# Patient Record
Sex: FEMALE | Race: WHITE | NOT HISPANIC OR LATINO | Employment: OTHER | URBAN - METROPOLITAN AREA
[De-identification: names, ages, dates, MRNs, and addresses within clinical notes are randomized per-mention and may not be internally consistent; named-entity substitution may affect disease eponyms.]

---

## 2017-06-07 ENCOUNTER — GENERIC CONVERSION - ENCOUNTER (OUTPATIENT)
Dept: OTHER | Facility: OTHER | Age: 72
End: 2017-06-07

## 2017-06-14 ENCOUNTER — GENERIC CONVERSION - ENCOUNTER (OUTPATIENT)
Dept: OTHER | Facility: OTHER | Age: 72
End: 2017-06-14

## 2017-06-14 ENCOUNTER — LAB CONVERSION - ENCOUNTER (OUTPATIENT)
Dept: OTHER | Facility: OTHER | Age: 72
End: 2017-06-14

## 2017-06-14 LAB
A/G RATIO (HISTORICAL): 1.8 (CALC) (ref 1–2.5)
ALBUMIN SERPL BCP-MCNC: 4.5 G/DL (ref 3.6–5.1)
ALP SERPL-CCNC: 89 U/L (ref 33–130)
ALT SERPL W P-5'-P-CCNC: 13 U/L (ref 6–29)
AST SERPL W P-5'-P-CCNC: 18 U/L (ref 10–35)
BASOPHILS # BLD AUTO: 0.6 %
BASOPHILS # BLD AUTO: 44 CELLS/UL (ref 0–200)
BILIRUB SERPL-MCNC: 0.6 MG/DL (ref 0.2–1.2)
BUN SERPL-MCNC: 13 MG/DL (ref 7–25)
BUN/CREA RATIO (HISTORICAL): 13 (CALC) (ref 6–22)
CALCIUM SERPL-MCNC: 9.7 MG/DL (ref 8.6–10.4)
CHLORIDE SERPL-SCNC: 100 MMOL/L (ref 98–110)
CHOLEST SERPL-MCNC: 167 MG/DL (ref 125–200)
CHOLEST/HDLC SERPL: 2.1 (CALC)
CO2 SERPL-SCNC: 25 MMOL/L (ref 20–31)
CREAT SERPL-MCNC: 1 MG/DL (ref 0.6–0.93)
DEPRECATED RDW RBC AUTO: 13.3 % (ref 11–15)
EGFR AFRICAN AMERICAN (HISTORICAL): 64 ML/MIN/1.73M2
EGFR-AMERICAN CALC (HISTORICAL): 55 ML/MIN/1.73M2
EOSINOPHIL # BLD AUTO: 292 CELLS/UL (ref 15–500)
EOSINOPHIL # BLD AUTO: 4 %
GAMMA GLOBULIN (HISTORICAL): 2.5 G/DL (CALC) (ref 1.9–3.7)
GLUCOSE (HISTORICAL): 115 MG/DL (ref 65–99)
HCT VFR BLD AUTO: 38.1 % (ref 35–45)
HDLC SERPL-MCNC: 78 MG/DL
HGB BLD-MCNC: 12.7 G/DL (ref 11.7–15.5)
LDL CHOLESTEROL (HISTORICAL): 70 MG/DL (CALC)
LYMPHOCYTES # BLD AUTO: 2161 CELLS/UL (ref 850–3900)
LYMPHOCYTES # BLD AUTO: 29.6 %
MCH RBC QN AUTO: 31.2 PG (ref 27–33)
MCHC RBC AUTO-ENTMCNC: 33.3 G/DL (ref 32–36)
MCV RBC AUTO: 93.8 FL (ref 80–100)
MONOCYTES # BLD AUTO: 694 CELLS/UL (ref 200–950)
MONOCYTES (HISTORICAL): 9.5 %
NEUTROPHILS # BLD AUTO: 4110 CELLS/UL (ref 1500–7800)
NEUTROPHILS # BLD AUTO: 56.3 %
NON-HDL-CHOL (CHOL-HDL) (HISTORICAL): 89 MG/DL (CALC)
PLATELET # BLD AUTO: 274 THOUSAND/UL (ref 140–400)
PMV BLD AUTO: 8.1 FL (ref 7.5–12.5)
POTASSIUM SERPL-SCNC: 4.3 MMOL/L (ref 3.5–5.3)
RBC # BLD AUTO: 4.07 MILLION/UL (ref 3.8–5.1)
SODIUM SERPL-SCNC: 133 MMOL/L (ref 135–146)
TOTAL PROTEIN (HISTORICAL): 7 G/DL (ref 6.1–8.1)
TRIGL SERPL-MCNC: 93 MG/DL
TSH SERPL DL<=0.05 MIU/L-ACNC: 1.76 MIU/L (ref 0.4–4.5)
WBC # BLD AUTO: 7.3 THOUSAND/UL (ref 3.8–10.8)

## 2017-06-28 ENCOUNTER — ALLSCRIPTS OFFICE VISIT (OUTPATIENT)
Dept: OTHER | Facility: OTHER | Age: 72
End: 2017-06-28

## 2017-06-28 LAB — HBA1C MFR BLD HPLC: 5.5 %

## 2017-07-26 ENCOUNTER — GENERIC CONVERSION - ENCOUNTER (OUTPATIENT)
Dept: OTHER | Facility: OTHER | Age: 72
End: 2017-07-26

## 2017-07-26 ENCOUNTER — HOSPITAL ENCOUNTER (OUTPATIENT)
Dept: RADIOLOGY | Facility: HOSPITAL | Age: 72
Discharge: HOME | End: 2017-07-26
Attending: INTERNAL MEDICINE
Payer: COMMERCIAL

## 2017-07-26 DIAGNOSIS — R92.8 OTHER ABNORMAL AND INCONCLUSIVE FINDINGS ON DIAGNOSTIC IMAGING OF BREAST: ICD-10-CM

## 2017-07-26 PROCEDURE — G0206 DX MAMMO INCL CAD UNI: HCPCS

## 2017-10-18 DIAGNOSIS — Z12.31 ENCOUNTER FOR SCREENING MAMMOGRAM FOR MALIGNANT NEOPLASM OF BREAST: ICD-10-CM

## 2017-10-25 ENCOUNTER — GENERIC CONVERSION - ENCOUNTER (OUTPATIENT)
Dept: OTHER | Facility: OTHER | Age: 72
End: 2017-10-25

## 2017-10-25 ENCOUNTER — HOSPITAL ENCOUNTER (OUTPATIENT)
Dept: RADIOLOGY | Facility: HOSPITAL | Age: 72
Discharge: HOME | End: 2017-10-25
Attending: INTERNAL MEDICINE
Payer: COMMERCIAL

## 2017-10-25 DIAGNOSIS — Z12.31 VISIT FOR SCREENING MAMMOGRAM: ICD-10-CM

## 2017-10-25 PROCEDURE — G0202 SCR MAMMO BI INCL CAD: HCPCS

## 2017-10-25 PROCEDURE — 77063 BREAST TOMOSYNTHESIS BI: CPT

## 2017-11-27 ENCOUNTER — GENERIC CONVERSION - ENCOUNTER (OUTPATIENT)
Dept: OTHER | Facility: OTHER | Age: 72
End: 2017-11-27

## 2017-12-29 ENCOUNTER — LAB CONVERSION - ENCOUNTER (OUTPATIENT)
Dept: OTHER | Facility: OTHER | Age: 72
End: 2017-12-29

## 2017-12-29 LAB
A/G RATIO (HISTORICAL): 1.7 (CALC) (ref 1–2.5)
ALBUMIN SERPL BCP-MCNC: 4.6 G/DL (ref 3.6–5.1)
ALP SERPL-CCNC: 84 U/L (ref 33–130)
ALT SERPL W P-5'-P-CCNC: 15 U/L (ref 6–29)
AST SERPL W P-5'-P-CCNC: 18 U/L (ref 10–35)
BASOPHILS # BLD AUTO: 0.8 %
BASOPHILS # BLD AUTO: 59 CELLS/UL (ref 0–200)
BILIRUB SERPL-MCNC: 0.8 MG/DL (ref 0.2–1.2)
BUN SERPL-MCNC: 13 MG/DL (ref 7–25)
BUN/CREA RATIO (HISTORICAL): 13 (CALC) (ref 6–22)
CALCIUM SERPL-MCNC: 9.3 MG/DL (ref 8.6–10.4)
CHLORIDE SERPL-SCNC: 105 MMOL/L (ref 98–110)
CHOLEST SERPL-MCNC: 177 MG/DL
CHOLEST/HDLC SERPL: 2.1 (CALC)
CO2 SERPL-SCNC: 26 MMOL/L (ref 20–31)
CREAT SERPL-MCNC: 0.98 MG/DL (ref 0.6–0.93)
DEPRECATED RDW RBC AUTO: 11.7 % (ref 11–15)
EGFR AFRICAN AMERICAN (HISTORICAL): 66 ML/MIN/1.73M2
EGFR-AMERICAN CALC (HISTORICAL): 57 ML/MIN/1.73M2
EOSINOPHIL # BLD AUTO: 229 CELLS/UL (ref 15–500)
EOSINOPHIL # BLD AUTO: 3.1 %
GAMMA GLOBULIN (HISTORICAL): 2.7 G/DL (CALC) (ref 1.9–3.7)
GLUCOSE (HISTORICAL): 117 MG/DL (ref 65–99)
HBA1C MFR BLD HPLC: 5.9 % OF TOTAL HGB
HCT VFR BLD AUTO: 38.8 % (ref 35–45)
HDLC SERPL-MCNC: 84 MG/DL
HGB BLD-MCNC: 12.9 G/DL (ref 11.7–15.5)
LDL CHOLESTEROL (HISTORICAL): 74 MG/DL (CALC)
LYMPHOCYTES # BLD AUTO: 1991 CELLS/UL (ref 850–3900)
LYMPHOCYTES # BLD AUTO: 26.9 %
MCH RBC QN AUTO: 30.6 PG (ref 27–33)
MCHC RBC AUTO-ENTMCNC: 33.2 G/DL (ref 32–36)
MCV RBC AUTO: 92.2 FL (ref 80–100)
MONOCYTES # BLD AUTO: 718 CELLS/UL (ref 200–950)
MONOCYTES (HISTORICAL): 9.7 %
NEUTROPHILS # BLD AUTO: 4403 CELLS/UL (ref 1500–7800)
NEUTROPHILS # BLD AUTO: 59.5 %
NON-HDL-CHOL (CHOL-HDL) (HISTORICAL): 93 MG/DL (CALC)
PLATELET # BLD AUTO: 305 THOUSAND/UL (ref 140–400)
PMV BLD AUTO: 9.8 FL (ref 7.5–12.5)
POTASSIUM SERPL-SCNC: 4.1 MMOL/L (ref 3.5–5.3)
RBC # BLD AUTO: 4.21 MILLION/UL (ref 3.8–5.1)
SODIUM SERPL-SCNC: 139 MMOL/L (ref 135–146)
TOTAL PROTEIN (HISTORICAL): 7.3 G/DL (ref 6.1–8.1)
TRIGL SERPL-MCNC: 106 MG/DL
WBC # BLD AUTO: 7.4 THOUSAND/UL (ref 3.8–10.8)

## 2017-12-30 ENCOUNTER — GENERIC CONVERSION - ENCOUNTER (OUTPATIENT)
Dept: OTHER | Facility: OTHER | Age: 72
End: 2017-12-30

## 2018-01-09 NOTE — RESULT NOTES
Verified Results  MAMMO SCREENING BILATERAL W 3D & CAD [id number] 01:04PM Jovani Cárdenas Order Number: [id number]   TW Order Number: [id number]   TW Order Number: [id number]     Test Name Result Flag Reference   MAMMO SCREENING BILATERAL W 3D & CAD (Report)     Patient History:   Patient is postmenopausal    Family history of breast cancer in maternal grandmother at age 48   or over  Patient's BMI is 18 0      Reason for exam: screening (asymptomatic)  Screening     Mammo Screening Bilateral W DBT and CAD: October 12, 2016 - Check   In #: [de-identified]   2D/3D Procedure   3D views: Bilateral MLO, CC, and XCCL view(s) were taken  2D views: Bilateral MLO and CC view(s) were taken  Technologist: JOLIE Damon   Prior study comparison: September 10, 2015, bilateral screening    mammogram performed at [facility]  September 8, 2014, bilateral screening mammogram performed at [facility]  September 6, 2013, bilateral    screening mammogram performed at [facility]  September 4, 2012, bilateral screening mammogram    performed at [facility]  August 18, 2011, bilateral screening mammogram performed at [facility]  The breast tissue is extremely dense, potentially limiting the    sensitivity of mammography  Patient risk, included in this    report, assists in determining the appropriate screening regimen    (such as 3-D mammography or the inclusion of automated breast    ultrasound or MRI)  3-D mammography may also remain indicated as    screening  No dominant soft tissue mass, architectural    distortion or suspicious calcifications are noted in the right    breast  The skin and nipple contours are within normal limits  Benign calcifications are seen throughout both breasts       Within the left inner breast seen only on the CC projection, a    cluster of calcifications are new during the interim  Magnification and lateral mammography recommended  Needs additional imaging  ASSESSMENT: BiRad:0 - Incomplete: needs additional imaging    evaluation - Left     Recommendation:   Follow-up diagnostic mammogram of the left breast    8-10% of cancers will be missed on mammography  Management of a    palpable abnormality must be based on clinical grounds  Patients    will be notified of their results via letter from our facility  Accredited by [employer] and FDA       [facility]: [id number]     Risk Value(s):   Tyrer-Cuzick 10 Year: 2 678%, Tyrer-Cuzick Lifetime: 3 284%,    Myriad Table: 1 5%, SKYLA 5 Year: 1 2%, NCI Lifetime: 2 9%   Signed by:   Alana Rios MD   10/12/16       Plan  Abnormal mammogram of left breast    · MAMMO DIAGNOSTIC LEFT W CAD; Status:Hold For - Scheduling; Requested  for:12Oct2016;

## 2018-01-10 ENCOUNTER — ALLSCRIPTS OFFICE VISIT (OUTPATIENT)
Dept: OTHER | Facility: OTHER | Age: 73
End: 2018-01-10

## 2018-01-11 NOTE — RESULT NOTES
Discussion/Summary   Your mammogram is normal, please repeat yearly  - Dr Anna Mead 01:35PM Triny Todd     Test Name Result Flag Reference   MAMMO SCREENING BILATERAL W 3D & CAD (Report)     Patient History:   Patient is postmenopausal    Family history of breast cancer at age 48 or over in maternal    grandmother  Patient has never smoked  Patient's BMI is 18 0      Reason for exam: screening, asymptomatic  Screening     Mammo Screening Bilateral W DBT and CAD: October 25, 2017 - Check   In #: [de-identified]   2D/3D Procedure   3D views: Bilateral MLO, CC, and XCCL view(s) were taken  2D views: Bilateral MLO and CC view(s) were taken  Technologist: JOLIE Ram   Prior study comparison: July 26, 2017, mammo diagnostic left W    CAD performed at [place] [address]  November 1, 2016, mammo diagnostic left W CAD performed at [place] [address]  October 12, 2016, mammo screening    bilateral W DBT and CAD performed at [place] [address]  September 10, 2015, bilateral screening mammogram    performed at [place] [address]  September 8, 2014, bilateral screening mammogram performed at [place] [address]  September 6, 2013, bilateral screening   mammogram performed at [place] [address]  September 4, 2012, bilateral screening mammogram performed at [place] [address]  August 18, 2011, bilateral    screening mammogram performed at [place] [address]  July 26, 2010, bilateral screening mammogram performed    at [place] [address]  July 23, 2009, bilateral   screening mammogram performed at [place] [address]  The breast tissue is extremely dense, potentially limiting the    sensitivity of mammography   Patient risk, included in this report, assists in determining the appropriate screening regimen    (such as 3-D mammography or the inclusion of automated breast    ultrasound or MRI)  3-D mammography may also remain indicated as    screening  Bilateral digital mammography is performed  No    dominant soft tissue mass, architectural distortion or suspicious   calcifications are noted  The skin and nipple contours are    within normal limits  Scattered benign appearing calcifications    are noted  No evidence of malignancy  No significant changes when   compared to prior studies  1  No evidence of malignancy  2  No significant change when compared with the prior study  ACR BI-RADSï¾® Assessments: BiRad:2 - Benign     Recommendation:   Routine screening mammogram of both breasts in 1 year  The patient is scheduled in a reminder system for screening    mammography  8-10% of cancers will be missed on mammography  Management of a    palpable abnormality must be based on clinical grounds  Patients    will be notified of their results via letter from our facility  Accredited by [employer] and FDA  Transcription Location:  [facility] 98: [id number]     Risk Value(s):   Tyrer-Cuzick 10 Year: 2 600%, Tyrer-Cuzick Lifetime: 2 900%,    Myriad Table: 1 5%, SKYLA 5 Year: 1 2%, NCI Lifetime: 2 7%   Signed by:   Ottoniel Silva MD   10/25/17       Plan  Abnormal mammogram of left breast    · MAMMO DIAGNOSTIC LEFT W CAD ; every 6 months; Last 26Jul2017; Status:Active  Screening for malignant neoplasm of breast    · MAMMO SCREENING BILATERAL W 3D & CAD ; every 1 year;  Last [id number];  Status:Active

## 2018-01-12 NOTE — PROGRESS NOTES
Assessment   1  Mixed hyperlipidemia (272 2) (E78 2)   2  Hypothyroidism (244 9) (E03 9)   3  Abnormal fasting glucose (790 29) (R73 01)   4  Acute URI (465 9) (J06 9)    Plan   Abnormal fasting glucose    · (1) HEMOGLOBIN A1C; Status:Active; Requested for:27Uqj0761; Acute URI    · Azithromycin 250 MG Oral Tablet; TAKE 2 TABLETS ON DAY 1 THEN TAKE 1    TABLET A DAY FOR 4 DAYS   · Promethazine-DM 6 25-15 MG/5ML Oral Syrup; TAKE 5 ML EVERY 4 TO 6 HOURS    AS NEEDED FOR COUGH   · Follow-up visit in 6 months Evaluation and Treatment  Follow-up  Status: Complete     Done: [id number]  Hypothyroidism    · Levothyroxine Sodium 50 MCG Oral Tablet (Synthroid); TAKE 1 TABLET DAILY   · (1) TSH WITH FT4 REFLEX; Status:Active; Requested PGB:29XYN9799; Mixed hyperlipidemia    · Atorvastatin Calcium 10 MG Oral Tablet (Lipitor); 1 every day   · (1) CBC/PLT/DIFF; Status:Active; Requested for:64Xop6920;    · (1) COMPREHENSIVE METABOLIC PANEL; Status:Active; Requested for:27Leu5890;    · (1) LIPID PANEL, FASTING; Status:Active; Requested for:57Maz9630; Discussion/Summary      Hyperlipidemia stable, continue meds  stable  as above  up in 6 months after labs  Chief Complaint   review new lab -elevated sugar level--also cough, chest congestion      History of Present Illness      [facility] presents with complaints of gradual onset of constant episodes of moderate cold symptoms  Episodes started about 1 week ago  She is currently experiencing cold symptoms  Associated symptoms include hoarseness,-- dry cough,-- fatigue-- and-- chills, but-- no wheezing,-- no shortness of breath-- and-- no fever  The patient is being seen for follow-up of hypothyroidism of undetermined etiology  The patient reports doing well  She has had no significant interval events  The patient is currently asymptomatic  Medications:  the patient is adherent to her medication regimen, but-- she denies medication side effects        Disease management:  the patient is doing well with her goals  The patient states her hyperlipidemia has been under good control since the last visit  She has no significant interval events  Symptoms: The patient is currently asymptomatic  Associated symptoms include no focal neurologic deficits-- and-- no memory loss  Medications: the patient is adherent with her medication regimen  -- She denies medication side effects  The patient is doing well with her hyperlipidemia goals  Review of Systems        Constitutional: No fever, no chills, feels well, no tiredness, no recent weight gain or weight loss  Cardiovascular: No complaints of slow heart rate, no fast heart rate, no chest pain, no palpitations, no leg claudication, no lower extremity edema  Respiratory: No complaints of shortness of breath, no wheezing, no cough, no SOB on exertion, no orthopnea, no PND  Active Problems   1  Abnormal fasting glucose (790 29) (R73 01)   2  Abnormal mammogram of left breast (793 80) (R92 8)   3  Adult BMI <19 kg/sq m (V85 0) (Z68 1)   4  Benign familial tremor (333 1) (G25 0)   5  Colon polyp (211 3) (K63 5)   6  Encounter for screening for malignant neoplasm of colon (V76 51) (Z12 11)   7  Encounter for screening mammogram for malignant neoplasm of breast (V76 12)     (Z12 31)   8  Essential tremor (333 1) (G25 0)   9  Hypothyroidism (244 9) (E03 9)   10  Mixed hyperlipidemia (272 2) (E78 2)   11  Need for immunization against influenza (V04 81) (Z23)   12  Need for pneumococcal vaccination (V03 82) (Z23)   13  Osteoporosis (733 00) (M81 0)   14  Screening for malignant neoplasm of breast (V76 10) (Z12 31)   15  Screening for other and unspecified genitourinary condition (V81 6) (Z13 89)   16  Spondylosis of cervical region without myelopathy or radiculopathy (721 0) (M47 812)   17  Strain of right trapezius muscle (840 8) (S46 811A)   18   Well adult on routine health check (V70 0) (Z00 00)    Past Medical History   1  Encounter for routine pelvic examination (V72 31) (Z01 419)   2  History of acute bronchitis (V12 69) (Z87 09)     The active problems and past medical history were reviewed and updated today  Surgical History      The surgical history was reviewed and updated today  Family History   Mother    1  No pertinent family history  Brother    2  Family history of diabetes mellitus (V18 0) (Z83 3)     The family history was reviewed and updated today  Social History    · Former smoker (S54 22) (M16 213)   · Former smoker (B55 27) (V62 250)  The social history was reviewed and updated today  The social history was reviewed and is unchanged  Current Meds    1  Atorvastatin Calcium 10 MG Oral Tablet; 1 every day; Therapy: 27Xde0088 to (Last Symone Hawthorne)  Requested for: 28Jun2017 Ordered   2  Azopt 1 % Ophthalmic Suspension; Right eye only as directed Recorded   3  Levothyroxine Sodium 50 MCG Oral Tablet; TAKE 1 TABLET DAILY; Therapy: 67Fvf4038 to (Evaluate:23Jun2018)  Requested for: 28Jun2017; Last     Rx:28Jun2017 Ordered   4  Travatan 0 004 % SOLN; one drop each eye at hs;     Therapy: 98HYW8948 to  Requested for: 63Aqw2460 Recorded     The medication list was reviewed and updated today  Allergies   1  No Known Drug Allergies    Vitals   Vital Signs    Recorded: 57QSZ4943 01:14PM   Temperature 97 5 F   Heart Rate 72   Respiration 20   Systolic 888   Diastolic 84   Height 5 ft 3 in   Weight 103 lb    BMI Calculated 18 25   BSA Calculated 1 46     Physical Exam        Constitutional      General appearance: No acute distress, well appearing and well nourished  Ears, Nose, Mouth, and Throat      External inspection of ears and nose: Normal        Otoscopic examination: Abnormal   The right tympanic membrane was retracted  The left tympanic membrane was retracted        Nasal mucosa, septum, and turbinates: Abnormal   The bilateral nasal mucosa was edematous-- and-- red       Oropharynx: Normal with no erythema, edema, exudate or lesions  Pulmonary      Respiratory effort: No increased work of breathing or signs of respiratory distress  Auscultation of lungs: Clear to auscultation  Cardiovascular      Auscultation of heart: Normal rate and rhythm, normal S1 and S2, without murmurs  Examination of extremities for edema and/or varicosities: Normal        Carotid pulses: Normal        Abdomen      Abdomen: Non-tender, no masses  Liver and spleen: No hepatomegaly or splenomegaly  Lymphatic      Palpation of lymph nodes in neck: No lymphadenopathy  Health Management   Abnormal mammogram of left breast   MAMMO DIAGNOSTIC LEFT W CAD; every 6 months; Last 49Mbs5081; Next Due: 02CNX8083; Near    Due  Screening for malignant neoplasm of breast   MAMMO SCREENING BILATERAL W 3D & CAD; every 1 year; Last 57JYE3368; Next Due: 92TUR7099;     Active    Signatures    Electronically signed by : KATHY Leyva ; Tom 10 2018  2:20PM EST                       (Author)

## 2018-01-12 NOTE — RESULT NOTES
Verified Results  (1) GENITAL COMPREHENSIVE CULTURE 71UVJ2225 12:00AM Lugene Budd     Test Name Result Flag Reference   CULTURE, GENITAL      CULTURE, GENITAL         MICRO NUMBER:      [id number]    TEST STATUS:       FINAL    SPECIMEN SOURCE:   NOT GIVEN    SPECIMEN QUALITY:  ADEQUATE    RESULT:            Growth of normal urogenital genna         Discussion/Summary   Your culture is normal  - Dr Nahun Marshall

## 2018-01-13 VITALS
HEART RATE: 84 BPM | WEIGHT: 106 LBS | BODY MASS INDEX: 18.78 KG/M2 | RESPIRATION RATE: 16 BRPM | TEMPERATURE: 97.6 F | HEIGHT: 63 IN | SYSTOLIC BLOOD PRESSURE: 104 MMHG | DIASTOLIC BLOOD PRESSURE: 66 MMHG

## 2018-01-14 ENCOUNTER — HOSPITAL ENCOUNTER (EMERGENCY)
Facility: HOSPITAL | Age: 73
Discharge: HOME | End: 2018-01-14
Attending: EMERGENCY MEDICINE
Payer: COMMERCIAL

## 2018-01-14 ENCOUNTER — APPOINTMENT (EMERGENCY)
Dept: RADIOLOGY | Facility: HOSPITAL | Age: 73
End: 2018-01-14
Payer: COMMERCIAL

## 2018-01-14 VITALS
RESPIRATION RATE: 22 BRPM | DIASTOLIC BLOOD PRESSURE: 76 MMHG | HEART RATE: 98 BPM | SYSTOLIC BLOOD PRESSURE: 189 MMHG | WEIGHT: 106 LBS | BODY MASS INDEX: 18.78 KG/M2 | OXYGEN SATURATION: 99 % | TEMPERATURE: 98.3 F

## 2018-01-14 DIAGNOSIS — J40 BRONCHITIS: Primary | ICD-10-CM

## 2018-01-14 LAB
ANION GAP SERPL CALCULATED.3IONS-SCNC: 8 MMOL/L (ref 4–13)
BASOPHILS # BLD AUTO: 0 THOUSANDS/ΜL (ref 0–0.1)
BASOPHILS NFR BLD AUTO: 0 % (ref 0–1)
BUN SERPL-MCNC: 11 MG/DL (ref 5–25)
CALCIUM SERPL-MCNC: 9.6 MG/DL (ref 8.3–10.1)
CHLORIDE SERPL-SCNC: 101 MMOL/L (ref 100–108)
CO2 SERPL-SCNC: 30 MMOL/L (ref 21–32)
CREAT SERPL-MCNC: 1.02 MG/DL (ref 0.6–1.3)
EOSINOPHIL # BLD AUTO: 0.4 THOUSAND/ΜL (ref 0–0.61)
EOSINOPHIL NFR BLD AUTO: 4 % (ref 0–6)
ERYTHROCYTE [DISTWIDTH] IN BLOOD BY AUTOMATED COUNT: 12.4 % (ref 11.6–15.1)
GFR SERPL CREATININE-BSD FRML MDRD: 54 ML/MIN/1.73SQ M
GLUCOSE SERPL-MCNC: 103 MG/DL (ref 65–140)
HCT VFR BLD AUTO: 38.2 % (ref 37–47)
HGB BLD-MCNC: 12.9 G/DL (ref 12–16)
LYMPHOCYTES # BLD AUTO: 3.2 THOUSANDS/ΜL (ref 0.6–4.47)
LYMPHOCYTES NFR BLD AUTO: 28 % (ref 14–44)
MCH RBC QN AUTO: 31 PG (ref 27–31)
MCHC RBC AUTO-ENTMCNC: 33.7 G/DL (ref 31.4–37.4)
MCV RBC AUTO: 92 FL (ref 82–98)
MONOCYTES # BLD AUTO: 1.1 THOUSAND/ΜL (ref 0.17–1.22)
MONOCYTES NFR BLD AUTO: 10 % (ref 4–12)
NEUTROPHILS # BLD AUTO: 6.6 THOUSANDS/ΜL (ref 1.85–7.62)
NEUTS SEG NFR BLD AUTO: 58 % (ref 43–75)
NRBC BLD AUTO-RTO: 0 /100 WBCS
PLATELET # BLD AUTO: 273 THOUSANDS/UL (ref 130–400)
PMV BLD AUTO: 7.2 FL (ref 8.9–12.7)
POTASSIUM SERPL-SCNC: 4.9 MMOL/L (ref 3.5–5.3)
RBC # BLD AUTO: 4.15 MILLION/UL (ref 4.2–5.4)
SODIUM SERPL-SCNC: 139 MMOL/L (ref 136–145)
WBC # BLD AUTO: 11.4 THOUSAND/UL (ref 4.8–10.8)

## 2018-01-14 PROCEDURE — 96360 HYDRATION IV INFUSION INIT: CPT

## 2018-01-14 PROCEDURE — 94640 AIRWAY INHALATION TREATMENT: CPT

## 2018-01-14 PROCEDURE — 80048 BASIC METABOLIC PNL TOTAL CA: CPT | Performed by: EMERGENCY MEDICINE

## 2018-01-14 PROCEDURE — 36415 COLL VENOUS BLD VENIPUNCTURE: CPT | Performed by: EMERGENCY MEDICINE

## 2018-01-14 PROCEDURE — 85025 COMPLETE CBC W/AUTO DIFF WBC: CPT | Performed by: EMERGENCY MEDICINE

## 2018-01-14 PROCEDURE — 93005 ELECTROCARDIOGRAM TRACING: CPT

## 2018-01-14 PROCEDURE — 99285 EMERGENCY DEPT VISIT HI MDM: CPT

## 2018-01-14 PROCEDURE — 93005 ELECTROCARDIOGRAM TRACING: CPT | Performed by: EMERGENCY MEDICINE

## 2018-01-14 PROCEDURE — 71045 X-RAY EXAM CHEST 1 VIEW: CPT

## 2018-01-14 RX ORDER — BENZONATATE 100 MG/1
100 CAPSULE ORAL EVERY 8 HOURS
Qty: 21 CAPSULE | Refills: 0 | Status: SHIPPED | OUTPATIENT
Start: 2018-01-14 | End: 2018-01-21

## 2018-01-14 RX ORDER — PREDNISONE 20 MG/1
40 TABLET ORAL DAILY
Qty: 8 TABLET | Refills: 0 | Status: SHIPPED | OUTPATIENT
Start: 2018-01-14 | End: 2018-01-18

## 2018-01-14 RX ORDER — ALBUTEROL SULFATE 90 UG/1
2 AEROSOL, METERED RESPIRATORY (INHALATION) EVERY 4 HOURS PRN
Qty: 1 INHALER | Refills: 0 | Status: SHIPPED | OUTPATIENT
Start: 2018-01-14 | End: 2018-09-10 | Stop reason: ALTCHOICE

## 2018-01-14 RX ORDER — BENZONATATE 100 MG/1
100 CAPSULE ORAL ONCE
Status: COMPLETED | OUTPATIENT
Start: 2018-01-14 | End: 2018-01-14

## 2018-01-14 RX ORDER — PREDNISONE 20 MG/1
60 TABLET ORAL ONCE
Status: COMPLETED | OUTPATIENT
Start: 2018-01-14 | End: 2018-01-14

## 2018-01-14 RX ORDER — IPRATROPIUM BROMIDE AND ALBUTEROL SULFATE 2.5; .5 MG/3ML; MG/3ML
3 SOLUTION RESPIRATORY (INHALATION) ONCE
Status: COMPLETED | OUTPATIENT
Start: 2018-01-14 | End: 2018-01-14

## 2018-01-14 RX ADMIN — SODIUM CHLORIDE 500 ML: 0.9 INJECTION, SOLUTION INTRAVENOUS at 16:11

## 2018-01-14 RX ADMIN — IPRATROPIUM BROMIDE AND ALBUTEROL SULFATE 3 ML: .5; 3 SOLUTION RESPIRATORY (INHALATION) at 15:59

## 2018-01-14 RX ADMIN — BENZONATATE 100 MG: 100 CAPSULE ORAL at 17:15

## 2018-01-14 RX ADMIN — PREDNISONE 60 MG: 20 TABLET ORAL at 15:59

## 2018-01-14 NOTE — DISCHARGE INSTRUCTIONS
Acute Bronchitis   WHAT YOU NEED TO KNOW:   Acute bronchitis is swelling and irritation in the air passages of your lungs  This irritation may cause you to cough or have other breathing problems  Acute bronchitis often starts because of another illness, such as a cold or the flu  The illness spreads from your nose and throat to your windpipe and airways  Bronchitis is often called a chest cold  Acute bronchitis lasts about 3 to 6 weeks and is usually not a serious illness  Your cough can last for several weeks  DISCHARGE INSTRUCTIONS:   Return to the emergency department if:   · You cough up blood  · Your lips or fingernails turn blue  · You feel like you are not getting enough air when you breathe  Contact your healthcare provider if:   · You have a fever  · Your breathing problems do not go away or get worse  · Your cough does not get better within 4 weeks  · You have questions or concerns about your condition or care  Self-care:   · Get more rest   Rest helps your body to heal  Slowly start to do more each day  Rest when you feel it is needed  · Avoid irritants in the air  Avoid chemicals, fumes, and dust  Wear a face mask if you must work around dust or fumes  Stay inside on days when air pollution levels are high  If you have allergies, stay inside when pollen counts are high  Do not use aerosol products, such as spray-on deodorant, bug spray, and hair spray  · Do not smoke or be around others who smoke  Nicotine and other chemicals in cigarettes and cigars damages the cilia that move mucus out of your lungs  Ask your healthcare provider for information if you currently smoke and need help to quit  E-cigarettes or smokeless tobacco still contain nicotine  Talk to your healthcare provider before you use these products  · Drink liquids as directed  Liquids help keep your air passages moist and help you cough up mucus   You may need to drink more liquids when you have acute bronchitis  Ask how much liquid to drink each day and which liquids are best for you  · Use a humidifier or vaporizer  Use a cool mist humidifier or a vaporizer to increase air moisture in your home  This may make it easier for you to breathe and help decrease your cough  Decrease risk for acute bronchitis:   · Get the vaccinations you need  Ask your healthcare provider if you should get vaccinated against the flu or pneumonia  · Prevent the spread of germs  You can decrease your risk of acute bronchitis and other illnesses by doing the following:     [facility] [place] AUTHORITY your hands often with soap and water  Carry germ-killing hand lotion or gel with you  You can use the lotion or gel to clean your hands when soap and water are not available  ¨ Do not touch your eyes, nose, or mouth unless you have washed your hands first     ¨ Always cover your mouth when you cough to prevent the spread of germs  It is best to cough into a tissue or your shirt sleeve instead of into your hand  Ask those around you cover their mouths when they cough  ¨ Try to avoid people who have a cold or the flu  If you are sick, stay away from others as much as possible  Medicines: Your healthcare provider may  give you any of the following:  · Ibuprofen or acetaminophen  are medicines that help lower your fever  They are available without a doctor's order  Ask your healthcare provider which medicine is right for you  Ask how much to take and how often to take it  Follow directions  These medicines can cause stomach bleeding if not taken correctly  Ibuprofen can cause kidney damage  Do not take ibuprofen if you have kidney disease, an ulcer, or allergies to aspirin  Acetaminophen can cause liver damage  Do not take more than 4,000 milligrams in 24 hours  · Decongestants  help loosen mucus in your lungs and make it easier to cough up  This can help you breathe easier  · Cough suppressants  decrease your urge to cough   If your cough produces mucus, do not take a cough suppressant unless your healthcare provider tells you to  Your healthcare provider may suggest that you take a cough suppressant at night so you can rest     · Inhalers  may be given  Your healthcare provider may give you one or more inhalers to help you breathe easier and cough less  An inhaler gives your medicine to open your airways  Ask your healthcare provider to show you how to use your inhaler correctly  · Take your medicine as directed  Contact your healthcare provider if you think your medicine is not helping or if you have side effects  Tell him of her if you are allergic to any medicine  Keep a list of the medicines, vitamins, and herbs you take  Include the amounts, and when and why you take them  Bring the list or the pill bottles to follow-up visits  Carry your medicine list with you in case of an emergency  Follow up with your healthcare provider as directed:  Write down questions you have so you will remember to ask them during your follow-up visits  © 2017 [employer] Information is for End User's use only and may not be sold, redistributed or otherwise used for commercial purposes  All illustrations and images included in CareNotes® are the copyrighted property of [employer]  or Isrrael Lopez  The above information is an  only  It is not intended as medical advice for individual conditions or treatments  Talk to your doctor, nurse or pharmacist before following any medical regimen to see if it is safe and effective for you

## 2018-01-15 LAB
ATRIAL RATE: 96 BPM
P AXIS: 74 DEGREES
PR INTERVAL: 156 MS
QRS AXIS: 54 DEGREES
QRSD INTERVAL: 74 MS
QT INTERVAL: 354 MS
QTC INTERVAL: 447 MS
T WAVE AXIS: 93 DEGREES
VENTRICULAR RATE: 96 BPM

## 2018-01-15 NOTE — RESULT NOTES
Verified Results  (1) CBC/PLT/DIFF [id number] 07:01AM Madelyn Tran     Test Name Result Flag Reference   WHITE BLOOD CELL COUNT 7 3 Thousand/uL  3 8-10 8   RED BLOOD CELL COUNT 4 07 Million/uL  3 80-5 10   HEMOGLOBIN 12 7 g/dL  11 7-15 5   HEMATOCRIT 38 1 %  35 0-45 0   MCV 93 8 fL  80 0-100 0   MCH 31 2 pg  27 0-33 0   MCHC 33 3 g/dL  32 0-36 0   RDW 13 3 %  11 0-15 0   PLATELET COUNT 447 Thousand/uL  140-400   ABSOLUTE NEUTROPHILS 4110 cells/uL  5555-3627   ABSOLUTE LYMPHOCYTES 2161 cells/uL  850-3900   ABSOLUTE MONOCYTES 694 cells/uL  200-950   ABSOLUTE EOSINOPHILS 292 cells/uL     ABSOLUTE BASOPHILS 44 cells/uL  0-200   NEUTROPHILS 56 3 %     LYMPHOCYTES 29 6 %     MONOCYTES 9 5 %     EOSINOPHILS 4 0 %     BASOPHILS 0 6 %     MPV 8 1 fL  7 5-12 5     (1) COMPREHENSIVE METABOLIC PANEL [id number] 34:51NW Marilou Todd     Test Name Result Flag Reference   GLUCOSE 115 mg/dL H 65-99   Fasting reference interval     For someone without known diabetes, a glucose value  between 100 and 125 mg/dL is consistent with  prediabetes and should be confirmed with a  follow-up test    UREA NITROGEN (BUN) 13 mg/dL  7-25   CREATININE 1 00 mg/dL H 0 60-0 93   For patients >52years of age, the reference limit  for Creatinine is approximately 13% higher for people  identified as -American  eGFR NON-AFR   AMERICAN 55 mL/min/1 73m2 L > OR = 60   eGFR AFRICAN AMERICAN 64 mL/min/1 73m2  > OR = 60   BUN/CREATININE RATIO 13 (calc)  6-22   SODIUM 133 mmol/L L 135-146   POTASSIUM 4 3 mmol/L  3 5-5 3   CHLORIDE 100 mmol/L     CARBON DIOXIDE 25 mmol/L  20-31   CALCIUM 9 7 mg/dL  8 6-10 4   PROTEIN, TOTAL 7 0 g/dL  6 1-8 1   ALBUMIN 4 5 g/dL  3 6-5 1   GLOBULIN 2 5 g/dL (calc)  1 9-3 7   ALBUMIN/GLOBULIN RATIO 1 8 (calc)  1 0-2 5   BILIRUBIN, TOTAL 0 6 mg/dL  0 2-1 2   ALKALINE PHOSPHATASE 89 U/L     AST 18 U/L  10-35   ALT 13 U/L  6-29     (1) LIPID PANEL, FASTING [id number] 07:01AM Marilou Todd     Test Name Result Flag Reference   CHOLESTEROL, TOTAL 167 mg/dL  125-200   HDL CHOLESTEROL 78 mg/dL  > OR = 46   TRIGLICERIDES 93 mg/dL  <205   LDL-CHOLESTEROL 70 mg/dL (calc)  <130   Desirable range <100 mg/dL for patients with CHD or  diabetes and <70 mg/dL for diabetic patients with  known heart disease  CHOL/HDLC RATIO 2 1 (calc)  < OR = 5 0   NON HDL CHOLESTEROL 89 mg/dL (calc)     Target for non-HDL cholesterol is 30 mg/dL higher than   LDL cholesterol target       (Q) TSH, 3RD GENERATION W/REFLEX TO FT4 [id number] 07:01AM Yasmin Todd   REPORT COMMENT:  FASTING:YES     Test Name Result Flag Reference   TSH W/REFLEX TO FT4 1 76 mIU/L  0 40-4 50

## 2018-01-15 NOTE — ED PROVIDER NOTES
History  Chief Complaint   Patient presents with    Shortness of Breath     Pt reports being treated with zpack which she just finished today, and feels she is getting worse  Pt reports "coughing fit" to where she can't catch her breath  Patient presents for evaluation of cough  States she is short of breath in with her coughing fits  No chest pain or fever  History provided by:  Patient   used: No    Shortness of Breath   Associated symptoms: cough    Associated symptoms: no abdominal pain, no chest pain and no fever        None       Past Medical History:   Diagnosis Date    Disease of thyroid gland     Glaucoma     Tremor        History reviewed  No pertinent surgical history  History reviewed  No pertinent family history  I have reviewed and agree with the history as documented  Social History   Substance Use Topics    Smoking status: Former Smoker    Smokeless tobacco: Never Used    Alcohol use Yes      Comment: daily beer        Review of Systems   Constitutional: Negative for fever  Respiratory: Positive for cough and shortness of breath  Cardiovascular: Negative for chest pain  Gastrointestinal: Negative for abdominal pain  All other systems reviewed and are negative  Physical Exam  ED Triage Vitals [01/14/18 1543]   Temperature Pulse Respirations Blood Pressure SpO2   98 3 °F (36 8 °C) 98 22 (!) 189/76 99 %      Temp Source Heart Rate Source Patient Position - Orthostatic VS BP Location FiO2 (%)   Tympanic Monitor Sitting Left arm --      Pain Score       No Pain           Orthostatic Vital Signs  Vitals:    01/14/18 1543   BP: (!) 189/76   Pulse: 98   Patient Position - Orthostatic VS: Sitting       Physical Exam   Constitutional: She is oriented to person, place, and time  No distress  HENT:   Mouth/Throat: Oropharynx is clear and moist    Eyes: Pupils are equal, round, and reactive to light  Neck: Normal range of motion  Cardiovascular: Normal rate, regular rhythm and intact distal pulses  Pulmonary/Chest: She is in respiratory distress  She has wheezes  Mild respiratory distress   Abdominal: Soft  Bowel sounds are normal  There is no tenderness  Musculoskeletal: Normal range of motion  Neurological: She is alert and oriented to person, place, and time  Skin: Capillary refill takes less than 2 seconds  She is not diaphoretic  Nursing note and vitals reviewed  ED Medications  Medications   predniSONE tablet 60 mg (60 mg Oral Given 1/14/18 1559)   ipratropium-albuterol (DUO-NEB) 0 5-2 5 mg/mL inhalation solution 3 mL (3 mL Nebulization Given 1/14/18 1559)   sodium chloride 0 9 % bolus 500 mL (0 mL Intravenous Stopped 1/14/18 1711)   benzonatate (TESSALON PERLES) capsule 100 mg (100 mg Oral Given 1/14/18 1715)       Diagnostic Studies  Results Reviewed     Procedure Component Value Units Date/Time    Basic metabolic panel [[id number]] Collected:  01/14/18 1611    Lab Status:  Final result Specimen:  Blood from Arm, Right Updated:  01/14/18 1648     Sodium 139 mmol/L      Potassium 4 9 mmol/L      Chloride 101 mmol/L      CO2 30 mmol/L      Anion Gap 8 mmol/L      BUN 11 mg/dL      Creatinine 1 02 mg/dL      Glucose 103 mg/dL      Calcium 9 6 mg/dL      eGFR 54 ml/min/1 73sq m     Narrative:         National Kidney Disease Education Program recommendations are as follows:  GFR calculation is accurate only with a steady state creatinine  Chronic Kidney disease less than 60 ml/min/1 73 sq  meters  Kidney failure less than 15 ml/min/1 73 sq  meters      CBC and differential [[id number]]  (Abnormal) Collected:  01/14/18 1611    Lab Status:  Final result Specimen:  Blood from Arm, Right Updated:  01/14/18 1635     WBC 11 40 (H) Thousand/uL      RBC 4 15 (L) Million/uL      Hemoglobin 12 9 g/dL      Hematocrit 38 2 %      MCV 92 fL      MCH 31 0 pg      MCHC 33 7 g/dL      RDW 12 4 %      MPV 7 2 (L) fL      Platelets 263 Thousands/uL      nRBC 0 /100 WBCs      Neutrophils Relative 58 %      Lymphocytes Relative 28 %      Monocytes Relative 10 %      Eosinophils Relative 4 %      Basophils Relative 0 %      Neutrophils Absolute 6 60 Thousands/µL      Lymphocytes Absolute 3 20 Thousands/µL      Monocytes Absolute 1 10 Thousand/µL      Eosinophils Absolute 0 40 Thousand/µL      Basophils Absolute 0 00 Thousands/µL                  XR chest 1 view portable    (Results Pending)              Procedures  Procedures       Phone Contacts  ED Phone Contact    ED Course  ED Course                                MDM  Number of Diagnoses or Management Options  Bronchitis:   Diagnosis management comments: Pulse ox 99% on RA indicating adequate oxygenation  CXR: NAD as read by me    Patient improved after the breathing treatment and was requesting discharged  Sent home with rx for inhaler, prednisone, and abx  Amount and/or Complexity of Data Reviewed  Clinical lab tests: ordered and reviewed  Tests in the radiology section of CPT®: ordered and reviewed  Independent visualization of images, tracings, or specimens: yes    Patient Progress  Patient progress: stable    CritCare Time    Disposition  Final diagnoses:   Bronchitis     Time reflects when diagnosis was documented in both MDM as applicable and the Disposition within this note     Time User Action Codes Description Comment    1/14/2018  4:55 PM Rochelle, [address] [place] [address] Bronchitis       ED Disposition     ED Disposition Condition Comment    Discharge  Braxton Gifford discharge to home/self care  Condition at discharge: stable        Follow-up Information     Follow up With Specialties Details Why Αμαλίας MD [facility] Medicine In 3 days  [address] [facility]    [address] [address]  [phone]          Discharge Medication List as of 1/14/2018  4:58 PM      START taking these medications    Details   albuterol (PROVENTIL HFA,VENTOLIN HFA) 90 mcg/act inhaler Inhale 2 puffs every 4 (four) hours as needed for wheezing, Starting Sun 1/14/2018, Print      benzonatate (TESSALON PERLES) 100 mg capsule Take 1 capsule by mouth every 8 (eight) hours for 7 days, Starting Sun 1/14/2018, Until Sun 1/21/2018, Print      predniSONE 20 mg tablet Take 2 tablets by mouth daily for 4 days, Starting Sun 1/14/2018, Until u 1/18/2018, Print           No discharge procedures on file      ED Provider  Electronically Signed by           Tylor Jones DO  01/14/18 4837

## 2018-01-15 NOTE — ED PROCEDURE NOTE
PROCEDURE  ECG 12 Lead Documentation  Date/Time: 1/14/2018 8:41 PM  Performed by: Russ Walker by: Elizabeth Quiñones     ECG reviewed by me, the ED Provider: yes    Patient location:  ED  Interpretation:     Interpretation: non-specific    Quality:     Tracing quality:  Limited by artifact (tremor)  Rate:     ECG rate:  96    ECG rate assessment: normal    Rhythm:     Rhythm: sinus rhythm    Ectopy:     Ectopy: none    ST segments:     ST segments:  Non-specific  T waves:     T waves: normal           Rosio Chin DO  01/14/18 2042

## 2018-01-16 NOTE — RESULT NOTES
Verified Results  MAMMO DIAGNOSTIC LEFT W CAD [id number] 01:47PM Joelle Byrd Order Number: [id number]    - Patient Instructions: To schedule this appointment, please contact Central Scheduling at [phone]  Do not wear any perfume, powder, lotion or deodorant on breast or underarm area  Please bring your doctors order, referral (if needed) and insurance information with you on the day of the test  Failure to bring this information may result in this test being rescheduled  Arrive 15 minutes prior to your appointment time to register  On the day of your test, please bring any prior mammogram or breast studies with you that were not performed at a [facility]  Failure to bring prior exams may result in your test needing to be rescheduled  Test Name Result Flag Reference   MAMMO DIAGNOSTIC LEFT W CAD (Report)     Patient History:   Patient is postmenopausal    Family history of breast cancer at age 48 or over in maternal    grandmother  Patient's BMI is 18 0      Reason for exam: follow-up at short interval from prior study  Follow-up calcifications  Mammo Diagnostic Left W CAD: July 26, 2017 - Check In #: [de-identified]   CC, MLO, ML, spot compression CC with magnification, and spot    compression ML with magnification view(s) were taken of the left    breast      Technologist: Meron Harden   Prior study comparison: November 1, 2016, mammo diagnostic left W   CAD performed at [facility] Dileep Simpson  October 12, 2016, mammo screening bilateral W DBT and CAD performed at    [facility]  September 10, 2015,    bilateral screening mammogram performed at [facility] Dileep Simpson  The breast tissue is extremely dense, potentially limiting the    sensitivity of mammography   Patient risk, included in this    report, assists in determining the appropriate screening regimen    (such as 3-D mammography or the inclusion of automated breast    ultrasound or MRI)  3-D mammography may also remain indicated as    screening  Scattered benign-appearing calcifications are seen in   the inner lower left breast  These include vascular    calcifications and additional benign-appearing punctate    calcifications  No suspicious clustered microcalcifications are    seen  Scattered benign-appearing calcifications  ACR BI-RADSï¾® Assessments: BiRad:3 - Probably Benign     Recommendation:   Follow-up diagnostic mammogram of the left breast in 6 months  Return to routine screening mammogram schedule for the right    breast    Analyzed by CAD     The patient is scheduled in a reminder system  8-10% of cancers will be missed on mammography  Management of a    palpable abnormality must be based on clinical grounds  Patients   will be notified of their results via letter from our facility  Accredited by [employer] of [employer] and FDA  Transcription Location: [facility] 98: [id number]     Risk Value(s):   Tyrer-Cuzick 10 Year: 2 600%, Tyrer-Cuzick Lifetime: 2 900%,    Myriad Table: 1 5%, SKYLA 5 Year: 1 2%, NCI Lifetime: 2 7%   Signed by:   Nestor Banks MD   7/26/17       Plan  Abnormal mammogram of left breast    · MAMMO DIAGNOSTIC LEFT W CAD ; every 6 months;  Last 26Jul2017; Status:Active

## 2018-01-16 NOTE — RESULT NOTES
Verified Results  (Q) TSH, 3RD GENERATION [id number] 07:18AM Adarsh Todd   REPORT COMMENT:  FASTING:YES     Test Name Result Flag Reference   TSH 1 37 mIU/L  0 40-4 50     (Q) CBC (H/H, RBC, INDICES, WBC, PLT) [id number] 07:18AM Adarsh Toddlucia     Test Name Result Flag Reference   WHITE BLOOD CELL COUNT 7 4 Thousand/uL  3 8-10 8   RED BLOOD CELL COUNT 4 07 Million/uL  3 80-5 10   HEMOGLOBIN 12 8 g/dL  11 7-15 5   HEMATOCRIT 38 7 %  35 0-45 0   MCV 95 1 fL  80 0-100 0   MCH 31 4 pg  27 0-33 0   MCHC 33 0 g/dL  32 0-36 0   RDW 13 2 %  11 0-15 0   PLATELET COUNT 349 Thousand/uL  140-400   MPV 8 0 fL  7 5-11 5     (1) COMPREHENSIVE METABOLIC PANEL [id number] 87:74UV Adarsh Todd     Test Name Result Flag Reference   GLUCOSE 94 mg/dL  65-99   Fasting reference interval   UREA NITROGEN (BUN) 17 mg/dL  7-25   CREATININE 0 91 mg/dL  0 60-0 93   For patients >52years of age, the reference limit  for Creatinine is approximately 13% higher for people  identified as -American  eGFR NON-AFR   AMERICAN 63 mL/min/1 73m2  > OR = 60   eGFR AFRICAN AMERICAN 73 mL/min/1 73m2  > OR = 60   BUN/CREATININE RATIO   3-56   NOT APPLICABLE (calc)   SODIUM 139 mmol/L  135-146   POTASSIUM 4 5 mmol/L  3 5-5 3   CHLORIDE 104 mmol/L     CARBON DIOXIDE 23 mmol/L  19-30   CALCIUM 9 6 mg/dL  8 6-10 4   PROTEIN, TOTAL 6 9 g/dL  6 1-8 1   ALBUMIN 4 5 g/dL  3 6-5 1   GLOBULIN 2 4 g/dL (calc)  1 9-3 7   ALBUMIN/GLOBULIN RATIO 1 9 (calc)  1 0-2 5   BILIRUBIN, TOTAL 0 7 mg/dL  0 2-1 2   ALKALINE PHOSPHATASE 90 U/L     AST 19 U/L  10-35   ALT 15 U/L  6-29     (Q) LIPID PANEL WITH REFLEX TO DIRECT LDL [id number] 07:18AM Adarsh Todd     Test Name Result Flag Reference   CHOLESTEROL, TOTAL 188 mg/dL  125-200   HDL CHOLESTEROL 76 mg/dL  > OR = 46   TRIGLICERIDES 91 mg/dL  <924   LDL-CHOLESTEROL 94 mg/dL (calc)  <130   Desirable range <100 mg/dL for patients with CHD or  diabetes and <70 mg/dL for diabetic patients with  known heart disease  CHOL/HDLC RATIO 2 5 (calc)  < OR = 5 0   NON HDL CHOLESTEROL 112 mg/dL (calc)     Target for non-HDL cholesterol is 30 mg/dL higher than   LDL cholesterol target

## 2018-01-16 NOTE — RESULT NOTES
Verified Results  MAMMO DIAGNOSTIC LEFT W CAD [id number] 02:22PM Wallace Merida Order Number: [id number]    - Patient Instructions: To schedule this appointment, please contact Central Scheduling at [phone]  Do not wear any perfume, powder, lotion or deodorant on breast or underarm area  Please bring your doctors order, referral (if needed) and insurance information with you on the day of the test  Failure to bring this information may result in this test being rescheduled  Arrive 15 minutes prior to your appointment time to register  On the day of your test, please bring any prior mammogram or breast studies with you that were not performed at a [facility]  Failure to bring prior exams may result in your test needing to be rescheduled   Order Number: [id number]    - Patient Instructions: To schedule this appointment, please contact Central Scheduling at [phone]  Do not wear any perfume, powder, lotion or deodorant on breast or underarm area  Please bring your doctors order, referral (if needed) and insurance information with you on the day of the test  Failure to bring this information may result in this test being rescheduled  Arrive 15 minutes prior to your appointment time to register  On the day of your test, please bring any prior mammogram or breast studies with you that were not performed at a [facility]  Failure to bring prior exams may result in your test needing to be rescheduled  Test Name Result Flag Reference   MAMMO DIAGNOSTIC LEFT W CAD (Report)     Patient History:   Patient is postmenopausal    Family history of breast cancer in maternal grandmother at age 48   or over  Patient's BMI is 18 0      Reason for exam: additional evaluation requested from prior    study  Evaluate calcifications seen on screening study       Mammo Diagnostic Left W CAD: November 1, 2016 - Check In #:    [de-identified]   CC and MLO view(s) were taken of the left breast      Technologist: JOLIE Chavez   Prior study comparison: October 12, 2016, mammo screening    bilateral W DBT and CAD performed at [place] [address]  September 10, 2015, bilateral screening mammogram    performed at [place] [address]  September 8, 2014, bilateral screening mammogram performed at [place] [address]  September 6, 2013, bilateral screening   mammogram performed at [place] [address]  September 4, 2012, bilateral screening mammogram performed at [place] [address]  August 18, 2011, bilateral    screening mammogram performed at [place] [address]  The breast tissue is extremely dense, potentially limiting the    sensitivity of mammography  Patient risk, included in this    report, assists in determining the appropriate screening regimen    (such as 3-D mammography or the inclusion of automated breast    ultrasound or MRI)  3-D mammography may also remain indicated as    screening  Scattered benign-appearing calcifications are seen in   the inner left breast on the cc view  Scattered benign-appearing calcifications in the left    breast      ASSESSMENT: BiRad:3 - Probably Benign     Recommendation:   Follow-up diagnostic mammogram of the left breast in 6 months  Analyzed by CAD     8-10% of cancers will be missed on mammography  Management of a    palpable abnormality must be based on clinical grounds  Patients   will be notified of their results via letter from our facility  Accredited by [employer] and FDA       Transcription Location: [facility] 98: [id number]     Risk Value(s):   Tyrer-Cuzick 10 Year: 2 678%, Tyrer-Cuzick Lifetime: 3 284%,    Myriad Table: 1 5%, SKYLA 5 Year: 1 2%, NCI Lifetime: 2 9%   Signed by:   Everett Fong MD   11/1/16       Plan  Abnormal mammogram of left breast    · MAMMO DIAGNOSTIC LEFT W CAD; Status:Hold For - Scheduling; Requested  for:[id number];

## 2018-01-22 VITALS
BODY MASS INDEX: 19.14 KG/M2 | HEART RATE: 80 BPM | WEIGHT: 108 LBS | TEMPERATURE: 96.1 F | SYSTOLIC BLOOD PRESSURE: 118 MMHG | RESPIRATION RATE: 20 BRPM | DIASTOLIC BLOOD PRESSURE: 80 MMHG | HEIGHT: 63 IN

## 2018-01-23 VITALS
HEIGHT: 63 IN | TEMPERATURE: 97.5 F | RESPIRATION RATE: 20 BRPM | BODY MASS INDEX: 18.25 KG/M2 | DIASTOLIC BLOOD PRESSURE: 84 MMHG | SYSTOLIC BLOOD PRESSURE: 136 MMHG | HEART RATE: 72 BPM | WEIGHT: 103 LBS

## 2018-01-23 NOTE — RESULT NOTES
Verified Results  (1) CBC/PLT/DIFF [id number] 07:15AM Any Todd     Test Name Result Flag Reference   WHITE BLOOD CELL COUNT 7 4 Thousand/uL  3 8-10 8   RED BLOOD CELL COUNT 4 21 Million/uL  3 80-5 10   HEMOGLOBIN 12 9 g/dL  11 7-15 5   HEMATOCRIT 38 8 %  35 0-45 0   MCV 92 2 fL  80 0-100 0   MCH 30 6 pg  27 0-33 0   MCHC 33 2 g/dL  32 0-36 0   RDW 11 7 %  11 0-15 0   PLATELET COUNT 920 Thousand/uL  140-400   ABSOLUTE NEUTROPHILS 4403 cells/uL  1996-3466   ABSOLUTE LYMPHOCYTES 1991 cells/uL  850-3900   ABSOLUTE MONOCYTES 718 cells/uL  200-950   ABSOLUTE EOSINOPHILS 229 cells/uL     ABSOLUTE BASOPHILS 59 cells/uL  0-200   NEUTROPHILS 59 5 %     LYMPHOCYTES 26 9 %     MONOCYTES 9 7 %     EOSINOPHILS 3 1 %     BASOPHILS 0 8 %     MPV 9 8 fL  7 5-12 5     (1) COMPREHENSIVE METABOLIC PANEL [id number] 36:53AK [facility]     Test Name Result Flag Reference   GLUCOSE 117 mg/dL H 65-99   Fasting reference interval     For someone without known diabetes, a glucose value  between 100 and 125 mg/dL is consistent with  prediabetes and should be confirmed with a  follow-up test    UREA NITROGEN (BUN) 13 mg/dL  7-25   CREATININE 0 98 mg/dL H 0 60-0 93   For patients >52years of age, the reference limit  for Creatinine is approximately 13% higher for people  identified as -American  eGFR NON-AFR   AMERICAN 57 mL/min/1 73m2 L > OR = 60   eGFR AFRICAN AMERICAN 66 mL/min/1 73m2  > OR = 60   BUN/CREATININE RATIO 13 (calc)  6-22   SODIUM 139 mmol/L  135-146   POTASSIUM 4 1 mmol/L  3 5-5 3   CHLORIDE 105 mmol/L     CARBON DIOXIDE 26 mmol/L  20-31   CALCIUM 9 3 mg/dL  8 6-10 4   PROTEIN, TOTAL 7 3 g/dL  6 1-8 1   ALBUMIN 4 6 g/dL  3 6-5 1   GLOBULIN 2 7 g/dL (calc)  1 9-3 7   ALBUMIN/GLOBULIN RATIO 1 7 (calc)  1 0-2 5   BILIRUBIN, TOTAL 0 8 mg/dL  0 2-1 2   ALKALINE PHOSPHATASE 84 U/L     AST 18 U/L  10-35   ALT 15 U/L  6-29     (1) LIPID PANEL, FASTING [id number] 07:15AM Any Todd     Test Name Result Flag Reference   CHOLESTEROL, TOTAL 177 mg/dL  <200   HDL CHOLESTEROL 84 mg/dL  >76   TRIGLICERIDES 687 mg/dL  <150   LDL-CHOLESTEROL 74 mg/dL (calc)     Reference range: <100     Desirable range <100 mg/dL for patients with CHD or  diabetes and <70 mg/dL for diabetic patients with  known heart disease  LDL-C is now calculated using the Sterling-Barrera   calculation, which is a validated novel method providing   better accuracy than the Friedewald equation in the   estimation of LDL-C  Tyler Swanson  [place] [address];257(05): [phone]   (http://Crowdrally/faq/JVY931)   CHOL/HDLC RATIO 2 1 (calc)  <5 0   NON HDL CHOLESTEROL 93 mg/dL (calc)  <130   For patients with diabetes plus 1 major ASCVD risk   factor, treating to a non-HDL-C goal of <100 mg/dL   (LDL-C of <70 mg/dL) is considered a therapeutic   option  (Q) HEMOGLOBIN A1c [id number] 07:15AM King Barak Todd   REPORT COMMENT:  FASTING:YES     Test Name Result Flag Reference   HEMOGLOBIN A1c 5 9 % of total Hgb H <5 7   For someone without known diabetes, a hemoglobin   A1c value between 5 7% and 6 4% is consistent with  prediabetes and should be confirmed with a   follow-up test      For someone with known diabetes, a value <7%  indicates that their diabetes is well controlled  A1c  targets should be individualized based on duration of  diabetes, age, comorbid conditions, and other  considerations  This assay result is consistent with an increased risk  of diabetes  Currently, no consensus exists regarding use of  hemoglobin A1c for diagnosis of diabetes for children

## 2018-07-11 LAB
ALBUMIN SERPL-MCNC: 4.7 G/DL (ref 3.6–5.1)
ALBUMIN/GLOB SERPL: 2 (CALC) (ref 1–2.5)
ALP SERPL-CCNC: 82 U/L (ref 33–130)
ALT SERPL-CCNC: 14 U/L (ref 6–29)
AST SERPL-CCNC: 18 U/L (ref 10–35)
BASOPHILS # BLD AUTO: 53 CELLS/UL (ref 0–200)
BASOPHILS NFR BLD AUTO: 0.8 %
BILIRUB SERPL-MCNC: 0.8 MG/DL (ref 0.2–1.2)
BUN SERPL-MCNC: 14 MG/DL (ref 7–25)
BUN/CREAT SERPL: 14 (CALC) (ref 6–22)
CALCIUM SERPL-MCNC: 9.4 MG/DL (ref 8.6–10.4)
CHLORIDE SERPL-SCNC: 99 MMOL/L (ref 98–110)
CHOLEST SERPL-MCNC: 174 MG/DL
CHOLEST/HDLC SERPL: 2.1 (CALC)
CO2 SERPL-SCNC: 26 MMOL/L (ref 20–31)
CREAT SERPL-MCNC: 0.99 MG/DL (ref 0.6–0.93)
EOSINOPHIL # BLD AUTO: 178 CELLS/UL (ref 15–500)
EOSINOPHIL NFR BLD AUTO: 2.7 %
ERYTHROCYTE [DISTWIDTH] IN BLOOD BY AUTOMATED COUNT: 11.8 % (ref 11–15)
GLOBULIN SER CALC-MCNC: 2.4 G/DL (CALC) (ref 1.9–3.7)
GLUCOSE SERPL-MCNC: 115 MG/DL (ref 65–99)
HBA1C MFR BLD: 5.7 % OF TOTAL HGB
HCT VFR BLD AUTO: 36.6 % (ref 35–45)
HDLC SERPL-MCNC: 83 MG/DL
HGB BLD-MCNC: 12.3 G/DL (ref 11.7–15.5)
LDLC SERPL CALC-MCNC: 71 MG/DL (CALC)
LYMPHOCYTES # BLD AUTO: 1723 CELLS/UL (ref 850–3900)
LYMPHOCYTES NFR BLD AUTO: 26.1 %
MCH RBC QN AUTO: 31.1 PG (ref 27–33)
MCHC RBC AUTO-ENTMCNC: 33.6 G/DL (ref 32–36)
MCV RBC AUTO: 92.7 FL (ref 80–100)
MONOCYTES # BLD AUTO: 673 CELLS/UL (ref 200–950)
MONOCYTES NFR BLD AUTO: 10.2 %
NEUTROPHILS # BLD AUTO: 3973 CELLS/UL (ref 1500–7800)
NEUTROPHILS NFR BLD AUTO: 60.2 %
NONHDLC SERPL-MCNC: 91 MG/DL (CALC)
PLATELET # BLD AUTO: 276 THOUSAND/UL (ref 140–400)
PMV BLD REES-ECKER: 8.9 FL (ref 7.5–12.5)
POTASSIUM SERPL-SCNC: 4.4 MMOL/L (ref 3.5–5.3)
PROT SERPL-MCNC: 7.1 G/DL (ref 6.1–8.1)
RBC # BLD AUTO: 3.95 MILLION/UL (ref 3.8–5.1)
SL AMB EGFR AFRICAN AMERICAN: 65 ML/MIN/1.73M2
SL AMB EGFR NON AFRICAN AMERICAN: 56 ML/MIN/1.73M2
SODIUM SERPL-SCNC: 134 MMOL/L (ref 135–146)
TRIGL SERPL-MCNC: 121 MG/DL
TSH SERPL-ACNC: 1.2 MIU/L (ref 0.4–4.5)
WBC # BLD AUTO: 6.6 THOUSAND/UL (ref 3.8–10.8)

## 2018-07-28 PROBLEM — G25.0 ESSENTIAL TREMOR: Status: ACTIVE | Noted: 2017-11-27

## 2018-07-28 RX ORDER — BRINZOLAMIDE 10 MG/ML
SUSPENSION/ DROPS OPHTHALMIC 2 TIMES DAILY
COMMUNITY

## 2018-07-28 RX ORDER — ATORVASTATIN CALCIUM 10 MG/1
1 TABLET, FILM COATED ORAL DAILY
COMMUNITY
Start: 2012-08-22 | End: 2018-08-01 | Stop reason: SDUPTHER

## 2018-07-28 RX ORDER — LEVOTHYROXINE SODIUM 0.05 MG/1
1 TABLET ORAL DAILY
COMMUNITY
Start: 2012-08-27 | End: 2018-08-01 | Stop reason: SDUPTHER

## 2018-07-28 RX ORDER — TRAVOPROST OPHTHALMIC SOLUTION 0.04 MG/ML
1 SOLUTION OPHTHALMIC
COMMUNITY
Start: 2009-07-09

## 2018-07-28 NOTE — PROGRESS NOTES
Subjective:      Patient ID: Manjit Navarro is a 74 y o  female  Chief Complaint   Patient presents with    Review Labs     labs in chart        Here for follow up of of hyperlipidemia and hypothyroidism  Feels well  No side effects with medications  Denies cold or heat intolerance, weight changes, constipation  The following portions of the patient's history were reviewed and updated as appropriate: allergies, current medications, past family history, past medical history, past social history, past surgical history and problem list     Review of Systems   Constitutional: Negative  Respiratory: Negative  Cardiovascular: Negative  Current Outpatient Prescriptions   Medication Sig Dispense Refill    atorvastatin (LIPITOR) 10 mg tablet Take 1 tablet (10 mg total) by mouth daily 90 tablet 3    brinzolamide (AZOPT) 1 % ophthalmic suspension Apply to eye      levothyroxine 50 mcg tablet Take 1 tablet (50 mcg total) by mouth daily 90 tablet 3    travoprost (TRAVATAN Z) 0 004 % ophthalmic solution Apply to eye      albuterol (PROVENTIL HFA,VENTOLIN HFA) 90 mcg/act inhaler Inhale 2 puffs every 4 (four) hours as needed for wheezing 1 Inhaler 0     No current facility-administered medications for this visit  Objective:    /72   Pulse 68   Temp 97 5 °F (36 4 °C)   Resp 16   Ht 5' 3 8" (1 621 m)   Wt 48 5 kg (107 lb)   BMI 18 48 kg/m²        Physical Exam   Constitutional: She appears well-developed and well-nourished  Eyes: Conjunctivae are normal    Neck: Neck supple  No JVD present  No thyromegaly present  Cardiovascular: Normal rate, regular rhythm, normal heart sounds and intact distal pulses  Exam reveals no gallop and no friction rub  No murmur heard  Pulmonary/Chest: Effort normal and breath sounds normal  She has no wheezes  She has no rales  Abdominal: Soft  Bowel sounds are normal  She exhibits no distension  There is no tenderness     Musculoskeletal: She exhibits no edema  Assessment/Plan:    Hypothyroidism  Reviewed labs with patient, TSH is within goal and she is clinically euthyroid  Continue levothyroxine at current dose  Mixed hyperlipidemia  Stable on atorvastatin, she will continue  Diagnoses and all orders for this visit:    Medicare annual wellness visit, initial    Hypothyroidism, unspecified type  -     levothyroxine 50 mcg tablet; Take 1 tablet (50 mcg total) by mouth daily    Mixed hyperlipidemia  -     atorvastatin (LIPITOR) 10 mg tablet; Take 1 tablet (10 mg total) by mouth daily  -     CBC and differential; Future  -     Comprehensive metabolic panel; Future  -     Lipid panel; Future  -     CBC and differential  -     Comprehensive metabolic panel  -     Lipid panel    Other orders  -     Discontinue: atorvastatin (LIPITOR) 10 mg tablet; Take 1 tablet by mouth daily  -     brinzolamide (AZOPT) 1 % ophthalmic suspension; Apply to eye  -     Discontinue: levothyroxine 50 mcg tablet; Take 1 tablet by mouth daily  -     travoprost (TRAVATAN Z) 0 004 % ophthalmic solution; Apply to eye          Return in about 6 months (around 2/1/2019)         Sheila Titus MD

## 2018-08-01 ENCOUNTER — OFFICE VISIT (OUTPATIENT)
Dept: FAMILY MEDICINE CLINIC | Facility: CLINIC | Age: 73
End: 2018-08-01
Payer: COMMERCIAL

## 2018-08-01 VITALS
WEIGHT: 107 LBS | HEART RATE: 68 BPM | SYSTOLIC BLOOD PRESSURE: 118 MMHG | TEMPERATURE: 97.5 F | DIASTOLIC BLOOD PRESSURE: 72 MMHG | RESPIRATION RATE: 16 BRPM | BODY MASS INDEX: 18.27 KG/M2 | HEIGHT: 64 IN

## 2018-08-01 DIAGNOSIS — Z00.00 MEDICARE ANNUAL WELLNESS VISIT, INITIAL: Primary | ICD-10-CM

## 2018-08-01 DIAGNOSIS — E78.2 MIXED HYPERLIPIDEMIA: ICD-10-CM

## 2018-08-01 DIAGNOSIS — E03.9 HYPOTHYROIDISM, UNSPECIFIED TYPE: ICD-10-CM

## 2018-08-01 PROCEDURE — 99213 OFFICE O/P EST LOW 20 MIN: CPT | Performed by: INTERNAL MEDICINE

## 2018-08-01 PROCEDURE — G0438 PPPS, INITIAL VISIT: HCPCS | Performed by: INTERNAL MEDICINE

## 2018-08-01 PROCEDURE — 3725F SCREEN DEPRESSION PERFORMED: CPT | Performed by: INTERNAL MEDICINE

## 2018-08-01 PROCEDURE — 1101F PT FALLS ASSESS-DOCD LE1/YR: CPT | Performed by: INTERNAL MEDICINE

## 2018-08-01 RX ORDER — LEVOTHYROXINE SODIUM 0.05 MG/1
50 TABLET ORAL DAILY
Qty: 90 TABLET | Refills: 3 | Status: SHIPPED | OUTPATIENT
Start: 2018-08-01 | End: 2019-08-14 | Stop reason: SDUPTHER

## 2018-08-01 RX ORDER — ATORVASTATIN CALCIUM 10 MG/1
10 TABLET, FILM COATED ORAL DAILY
Qty: 90 TABLET | Refills: 3 | Status: SHIPPED | OUTPATIENT
Start: 2018-08-01 | End: 2019-08-14 | Stop reason: SDUPTHER

## 2018-08-01 NOTE — ASSESSMENT & PLAN NOTE
Reviewed labs with patient, TSH is within goal and she is clinically euthyroid  Continue levothyroxine at current dose

## 2018-08-01 NOTE — PROGRESS NOTES
AW[facility] Clinical     ISAR:   Previous hospitalizations?:  No       Once in a Lifetime Medicare Screening:   EKG performed?:  Yes        Medicare Screening Tests and Risk Assessment:   AAA Risk Assessment    None Indicated:  Yes    Osteoporosis Risk Assessment    :  Yes    Age over 48:  Yes Low body weight (<127lbs):  Yes   HIV Risk Assessment    None indicated:  Yes        Drug and Alcohol Use:   Tobacco use    Cigarettes:  former smoker    Tobacco use duration    Tobacco Cessation Readiness    Alcohol use    Alcohol use:  occasional use    Concern about alcohol use:  No    Alcohol Treatment Readiness   Illicit Drug Use    Drug use:  never    Drug type:  no sedative use       Diet & Exercise:   Diet   What is your diet?:  Regular   How many servings a day of the following:   Exercise    Do you currently exercise?:  yes    Type of exercise:  cardio   bowls        Cognitive Impairment Screening:   Anxiety screenings preformed:   Yes Anxiety screen score:  0   Depression screening preformed:  Yes Depression screen score:  0   Depression screening results:  negative for symptoms   Cognitive Impairment Screening    Do you have difficulty learning or retaining new information?:  No Do you have difficulty handling new tasks?:  No   Do you have difficulty with reasoning?:  No Do you have difficulty with spatial ability and orientation?:  No   Do you have difficulty with language?:  No Do you have difficulty with behavior?:  No       Functional Ability/Level of Safety:   Hearing    Hearing difficulties:  Yes Bilateral:  slightly decreased   Left:  slightly decreased Right:  slightly decreased   Hearing aid:  No    Hearing Impairment Assessment    Hearing status:  Hard of hearing   Current Activities    Status:  unlimited ADL's, unlimited driving, unlimited IADL's, unlimited social activities   Help needed with the folllowing:    Using the phone:  No Transportation:  No   Shopping:  No Preparing Meals:  No   Doing Housework:  No Doing Laundry:  No   Managing Medications:  No Managing Money:  No   ADL    Feeding:  Independant   Oral hygiene and Facial grooming:  Independant   Bathing:  Independant   Upper Body Dressing:  Independant   Lower Body Dressing:  Independant   Toileting:  Independant   Bed Mobility:  Independant   Fall Risk   Have you fallen in the last 12 months?:  No    Injury History   Polypharmacy:  No Antidepressant Use:  No   Sedative Use:  No Antihypertensive Use:  No   Previous Fall:  No Alcohol Use:  No   Deconditioning:  No Visual Impairment:  No   Cogitive Impairment:  No Mmobility Impairment:  No   Postural Hypotension:  No Urinary Incontinence:  No       Home Safety:   Are there hazards in your environment?:  No   Home Safety Risk Factors   Unfamilar with surroundings:  No Uneven floors:  No   Stairs or handrail saftey risk:  No Loose rugs:  No   Household clutter:  No Poor household lighting:  No   No grab bars in bathroom:  Yes Further evaluation needed:  No       Advanced Directives:   Advanced Directives    Living Will:  Yes Durable POA for healthcare:  Yes   Patient's End of Life Decisions        Urinary Incontinence:   Do you have urinary incontinence?:  No        Glaucoma:           Assessment and Plan:    Problem List Items Addressed This Visit     Hypothyroidism     Reviewed labs with patient, TSH is within goal and she is clinically euthyroid  Continue levothyroxine at current dose  Relevant Medications    levothyroxine 50 mcg tablet    Mixed hyperlipidemia     Stable on atorvastatin, she will continue           Relevant Medications    atorvastatin (LIPITOR) 10 mg tablet    Other Relevant Orders    CBC and differential    Comprehensive metabolic panel    Lipid panel      Other Visit Diagnoses     Medicare annual wellness visit, initial    -  Primary        Health Maintenance Due   Topic Date Due    Depression Screening PHQ-9  1945    CRC Screening: Colonoscopy  1945    DTaP,Tdap,and Td Vaccines (1 - Tdap) 05/07/1964    Fall Risk  05/07/2008    Urinary Incontinence Screening  05/07/2008    GLAUCOMA SCREENING 72 + YR  05/07/2010         HPI:  Nandini Manriquez is a 74 y o  female here for her Initial Wellness Visit  Patient Active Problem List   Diagnosis    Essential tremor    Colon polyp    Hypothyroidism    Osteoporosis    Mixed hyperlipidemia     Past Medical History:   Diagnosis Date    Disease of thyroid gland     Glaucoma     Tremor      No past surgical history on file  Family History   Problem Relation Age of Onset    No Known Problems Mother     Diabetes Brother         DM     History   Smoking Status    Former Smoker   Smokeless Tobacco    Never Used     History   Alcohol Use    Yes     Comment: daily beer      History   Drug Use No       Current Outpatient Prescriptions   Medication Sig Dispense Refill    atorvastatin (LIPITOR) 10 mg tablet Take 1 tablet (10 mg total) by mouth daily 90 tablet 3    brinzolamide (AZOPT) 1 % ophthalmic suspension Apply to eye      levothyroxine 50 mcg tablet Take 1 tablet (50 mcg total) by mouth daily 90 tablet 3    travoprost (TRAVATAN Z) 0 004 % ophthalmic solution Apply to eye      albuterol (PROVENTIL HFA,VENTOLIN HFA) 90 mcg/act inhaler Inhale 2 puffs every 4 (four) hours as needed for wheezing 1 Inhaler 0     No current facility-administered medications for this visit        No Known Allergies  Immunization History   Administered Date(s) Administered    Influenza Split High Dose Preservative Free IM 10/18/2012, 10/03/2013    Influenza TIV (IM) 10/20/2010, 10/26/2011, 09/08/2014, 09/12/2015    Pneumococcal Conjugate 13-Valent 12/16/2015    Pneumococcal Polysaccharide PPV23 07/09/2009       Patient Care Team:  Angelica Snellen, MD as PCP - General  Marlene Bradley MD      Medicare Screening Tests and Risk Assessments:  [facility] Clinical     ISAR:   Previous hospitalizations?:  No       Once in a Lifetime Medicare Screening:   EKG performed?:  Yes        Medicare Screening Tests and Risk Assessment:   AAA Risk Assessment    None Indicated:  Yes    Osteoporosis Risk Assessment    :  Yes    Age over 48:  Yes Low body weight (<127lbs):  Yes   HIV Risk Assessment    None indicated:  Yes        Drug and Alcohol Use:   Tobacco use    Cigarettes:  former smoker    Tobacco use duration    Tobacco Cessation Readiness    Alcohol use    Alcohol use:  occasional use    Concern about alcohol use:  No    Alcohol Treatment Readiness   Illicit Drug Use    Drug use:  never    Drug type:  no sedative use       Diet & Exercise:   Diet   What is your diet?:  Regular   How many servings a day of the following:   Exercise    Do you currently exercise?:  yes    Type of exercise:  cardio   bowls        Cognitive Impairment Screening:   Anxiety screenings preformed:   Yes Anxiety screen score:  0   Depression screening preformed:  Yes Depression screen score:  0   Depression screening results:  negative for symptoms   Cognitive Impairment Screening    Do you have difficulty learning or retaining new information?:  No Do you have difficulty handling new tasks?:  No   Do you have difficulty with reasoning?:  No Do you have difficulty with spatial ability and orientation?:  No   Do you have difficulty with language?:  No Do you have difficulty with behavior?:  No       Functional Ability/Level of Safety:   Hearing    Hearing difficulties:  Yes Bilateral:  slightly decreased   Left:  slightly decreased Right:  slightly decreased   Hearing aid:  No    Hearing Impairment Assessment    Hearing status:  Hard of hearing   Current Activities    Status:  unlimited ADL's, unlimited driving, unlimited IADL's, unlimited social activities   Help needed with the folllowing:    Using the phone:  No Transportation:  No   Shopping:  No Preparing Meals:  No   Doing Housework:  No Doing Laundry:  No   Managing Medications:  No Managing Money:  No   ADL Feeding:  Independant   Oral hygiene and Facial grooming:  Independant   Bathing:  Independant   Upper Body Dressing:  Independant   Lower Body Dressing:  Independant   Toileting:  Independant   Bed Mobility:  Independant   Fall Risk   Have you fallen in the last 12 months?:  No    Injury History   Polypharmacy:  No Antidepressant Use:  No   Sedative Use:  No Antihypertensive Use:  No   Previous Fall:  No Alcohol Use:  No   Deconditioning:  No Visual Impairment:  No   Cogitive Impairment:  No Mmobility Impairment:  No   Postural Hypotension:  No Urinary Incontinence:  No       Home Safety:   Are there hazards in your environment?:  No   Home Safety Risk Factors   Unfamilar with surroundings:  No Uneven floors:  No   Stairs or handrail saftey risk:  No Loose rugs:  No   Household clutter:  No Poor household lighting:  No   No grab bars in bathroom:  Yes Further evaluation needed:  No       Advanced Directives:   Advanced Directives    Living Will:  Yes Durable POA for healthcare:  Yes   Patient's End of Life Decisions        Urinary Incontinence:   Do you have urinary incontinence?:  No        Glaucoma:            Provider Screening     Preventative Screening/Counseling:   Cardiovascular Screening/Counseling:   (Labs Q5 years, EKG optional one-time)   General:  Risks and Benefits Discussed, Screening Current Counseling:  Healthy Weight, Improve Cholesterol, Healthy Diet, Improve Blood Pressure, Improve Exercise Tolerance          Diabetes Screening/Counseling:   (2 tests/year if Pre-Diabetes or 1 test/year if no Diabetes)   General:  Risks and Benefits Discussed, Screening Current           Colorectal Cancer Screening/Counseling:   (FOBT Q1 yr; Flex Sig Q4 yrs or Q10 yrs after Screening Colonoscopy; Screening Colonoscpy Q2 yrs High Risk or Q10 yrs Low Risk; Barium Enema Q2 yrs High Risk or Q4 yrs Low Risk)   General:  Screening Not Indicated           Prostate Cancer Screening/Counseling:   (Annual)          Breast Cancer Screening/Counseling:   (Baseline Age 28 - 43; Annual Age 36+)   General:  Screening Not Indicated          Cervical Cancer Screening/Counseling:   (Annual for High Risk or Childbearing Age with Abnormal Pap in Last 3 yrs; Every 2 all others)   General:  Screening Not Indicated           Osteoporosis Screening/Counseling:   (Every 2 Yrs if at risk or more if medically necessary)   General:  Patient Declines           AAA Screening/Counseling:   (Once per Lifetime with risk factors)    General:  Screening Not Indicated           Glaucoma Screening/Counseling:   (Annual)   General:  Screening Current          HIV Screening/Counseling:   (Voluntary; Once annually for high risk OR 3 times for Pregnancy at diagnosis of IUP; 3rd trimester; and at Labor         Hepatitis C Screening:             Immunizations:   Influenza (annual):   Influenza UTD This Year   Pneumococcal (Once in a Lifetime):  Lifetime Vaccine Completed   Hepatitis B Series (low risk patients):  Series Not Indicated   Zostavax (Medicare D Coverage, Pt >72 yo):  Patient Declines   TD (Non-Medicare Wellness  Visit required):  Patient Declines       Other Preventative Couseling (Non-Medicare Wellness Visit Required):       Referrals (Non-Medicare Wellness Visit Required):       Medical Equipment/Suppliers:

## 2018-09-10 ENCOUNTER — OFFICE VISIT (OUTPATIENT)
Dept: FAMILY MEDICINE CLINIC | Facility: CLINIC | Age: 73
End: 2018-09-10
Payer: COMMERCIAL

## 2018-09-10 VITALS
SYSTOLIC BLOOD PRESSURE: 120 MMHG | RESPIRATION RATE: 16 BRPM | BODY MASS INDEX: 18.61 KG/M2 | DIASTOLIC BLOOD PRESSURE: 80 MMHG | TEMPERATURE: 96.6 F | HEART RATE: 68 BPM | HEIGHT: 64 IN | WEIGHT: 109 LBS

## 2018-09-10 DIAGNOSIS — E03.9 HYPOTHYROIDISM, UNSPECIFIED TYPE: ICD-10-CM

## 2018-09-10 DIAGNOSIS — E78.2 MIXED HYPERLIPIDEMIA: ICD-10-CM

## 2018-09-10 DIAGNOSIS — R42 DIZZINESS: Primary | ICD-10-CM

## 2018-09-10 DIAGNOSIS — G25.0 ESSENTIAL TREMOR: ICD-10-CM

## 2018-09-10 PROCEDURE — 99214 OFFICE O/P EST MOD 30 MIN: CPT | Performed by: FAMILY MEDICINE

## 2018-09-10 NOTE — PROGRESS NOTES
Assessment/Plan:    No problem-specific Assessment & Plan notes found for this encounter  CT head due to acute sx since can't keep head still for MRI due to tremors  Neuro consult for tremors, pt wants to hear options since failed many meds in past, possible additional referrals  Chol/thyroid stable       Diagnoses and all orders for this visit:    Dizziness  -     Ambulatory referral to Neurology; Future  -     CT head wo contrast; Future    Essential tremor  -     Ambulatory referral to Neurology; Future  -     CT head wo contrast; Future    Mixed hyperlipidemia    Hypothyroidism, unspecified type              No Follow-up on file  Subjective:      Patient ID: Vida Solano is a 74 y o  female  Chief Complaint   Patient presents with    Dizziness     Would like referral for Neuro prcma    Nausea       HPI  Last week dizzy episode  Staggering, off balance  Nausea  Fine sitting and laying down  Not with head movt  Some good days since  May last minutes  Works as [employer]  No fall  On/off for months  No imaging in past  Hx of essential tremors  Wants to see a neurologist, saw one in [id number] [facility], failed many meds  Tremors have gotten worse  Does not get anxious in MRI but can't stay still  MRI distant past, 10y , of right shoulder    The following portions of the patient's history were reviewed and updated as appropriate: allergies, current medications, past family history, past medical history, past social history, past surgical history and problem list     Review of Systems   Constitutional: Negative for fever  Cardiovascular: Negative for chest pain and palpitations  Neurological: Positive for tremors  Negative for weakness           Current Outpatient Prescriptions   Medication Sig Dispense Refill    atorvastatin (LIPITOR) 10 mg tablet Take 1 tablet (10 mg total) by mouth daily 90 tablet 3    brinzolamide (AZOPT) 1 % ophthalmic suspension Apply to eye      levothyroxine 50 mcg tablet Take 1 tablet (50 mcg total) by mouth daily 90 tablet 3    travoprost (TRAVATAN Z) 0 004 % ophthalmic solution Apply to eye       No current facility-administered medications for this visit  Objective:    /80   Pulse 68   Temp (!) 96 6 °F (35 9 °C)   Resp 16   Ht 5' 3 8" (1 621 m)   Wt 49 4 kg (109 lb)   BMI 18 83 kg/m²        Physical Exam   Constitutional: She appears well-developed  No distress  HENT:   Head: Normocephalic  Mouth/Throat: No oropharyngeal exudate  Eyes: Conjunctivae are normal  No scleral icterus  Neck: Neck supple  No thyromegaly present  Cardiovascular: Normal rate and intact distal pulses  No murmur heard  Pulmonary/Chest: Effort normal  No respiratory distress  Abdominal: Soft  Musculoskeletal: She exhibits no edema or deformity  Lymphadenopathy:     She has no cervical adenopathy  Neurological: She is alert  She has normal reflexes  No cranial nerve deficit  She exhibits normal muscle tone  Head and hand tremors   Skin: Skin is warm and dry  Psychiatric: Her behavior is normal  Thought content normal    Nursing note and vitals reviewed               Gerald Gomez DO

## 2018-09-13 ENCOUNTER — OFFICE VISIT (OUTPATIENT)
Dept: NEUROLOGY | Facility: CLINIC | Age: 73
End: 2018-09-13
Payer: COMMERCIAL

## 2018-09-13 VITALS
SYSTOLIC BLOOD PRESSURE: 136 MMHG | DIASTOLIC BLOOD PRESSURE: 82 MMHG | BODY MASS INDEX: 18.78 KG/M2 | HEIGHT: 64 IN | HEART RATE: 73 BPM | WEIGHT: 110 LBS

## 2018-09-13 DIAGNOSIS — G24.9 DYSKINESIA: ICD-10-CM

## 2018-09-13 DIAGNOSIS — H81.399 PERIPHERAL VERTIGO, UNSPECIFIED LATERALITY: ICD-10-CM

## 2018-09-13 DIAGNOSIS — G25.0 ESSENTIAL TREMOR: ICD-10-CM

## 2018-09-13 DIAGNOSIS — G25.71 AKATHISIA: Primary | ICD-10-CM

## 2018-09-13 PROCEDURE — 99204 OFFICE O/P NEW MOD 45 MIN: CPT | Performed by: PSYCHIATRY & NEUROLOGY

## 2018-09-13 RX ORDER — MECLIZINE HYDROCHLORIDE 25 MG/1
25 TABLET ORAL EVERY 8 HOURS PRN
Qty: 30 TABLET | Refills: 0 | Status: SHIPPED | OUTPATIENT
Start: 2018-09-13 | End: 2020-08-27 | Stop reason: ALTCHOICE

## 2018-09-13 RX ORDER — CLONAZEPAM 0.5 MG/1
TABLET ORAL
Qty: 60 TABLET | Refills: 0 | Status: SHIPPED | OUTPATIENT
Start: 2018-09-13 | End: 2018-09-26 | Stop reason: SDUPTHER

## 2018-09-13 NOTE — PROGRESS NOTES
Outpatient Neurology History and Physical  Brian Piedra  [id number]  35 y o   1945          Consult: Yes    Cody Sosa MD      Chief Complaint   Patient presents with    Dizziness           History Obtained from: patient     HPI:    Ms Easton Deleon is a 73 yo F with PMH of dyskinesia presents for evaluation of dizziness  She states that it really started 3 months ago  She describes feeling of lightheaded, nausea, imbalance, loss of equilibrium when she stands up  After few minutes it resolves  Last week it occurred a few times  This whole week she has felt great, hasn't had any dizziness  She did get new hearing aids two weeks ago  For her tremors, she has been tried on propranolol, primidone, topamax but none had helped  She has tried botox injections for head tremor but didn't think it helped  She has head and hand tremors for 40 years  When she's nervous or ill, her symptoms are worse  She appears to have some dyskinesia as well but says she has only been tried on medications for essential tremor  She denies relevant family history  Past Medical History:   Diagnosis Date    Disease of thyroid gland     Dizziness     Glaucoma     Tremor                Current Outpatient Prescriptions on File Prior to Visit   Medication Sig Dispense Refill    atorvastatin (LIPITOR) 10 mg tablet Take 1 tablet (10 mg total) by mouth daily 90 tablet 3    brinzolamide (AZOPT) 1 % ophthalmic suspension Apply to eye      levothyroxine 50 mcg tablet Take 1 tablet (50 mcg total) by mouth daily 90 tablet 3    travoprost (TRAVATAN Z) 0 004 % ophthalmic solution Apply to eye       No current facility-administered medications on file prior to visit          No Known Allergies      Family History   Problem Relation Age of Onset    No Known Problems Mother     Diabetes Brother         DM    Diabetes Sister                 Past Surgical History:   Procedure Laterality Date    CATARACT EXTRACTION, BILATERAL Social History     Social History    Marital status: Single     Spouse name: N/A    Number of children: N/A    Years of education: N/A     Occupational History    Not on file  Social History Main Topics    Smoking status: Former Smoker    Smokeless tobacco: Never Used    Alcohol use Yes      Comment: daily beer    Drug use: No    Sexual activity: Not on file     Other Topics Concern    Not on file     Social History Narrative    No narrative on file       Review of Systems  Refer to positive review of systems in HPI  Constitutional- No fever  Eyes- No visual change  ENT- Hearing normal  CV- No chest pain  Resp- No Shortness of breath  GI- No diarrhea  - Bladder normal  MS- No Arthritis   Skin- No rash  Psych- No depression  Endo- No DM  Heme- No nodes    PHYSICAL EXAM:    Vitals:    09/13/18 1501   BP: 136/82   BP Location: Left arm   Patient Position: Sitting   Pulse: 73   Weight: 49 9 kg (110 lb)   Height: 5' 3 5" (1 613 m)         Appearance: No Acute Distress, patient can't stay still  Constantly moving her body, hands, fidgety  Ophthalmoscopic: Disc Flat, Normal fundus  Carotid/Heart/Peripheral Vascular: No Bruits, RRR  Orientation: Awake, Alert, and Oriented x 3  Mental status:  Memory: Registation 3/3 Recall 3/3  Attention: Normal  Knowledge: Appropriate  Language: No aphasia  Speech: No dysarthria  Cranial Nerves:  2 No Visual Defect on Confrontation; Pupils round, equal, reactive to light  3,4,6 Extraocular Movements Intact; no nystagmus  5 Facial Sensation Intact  7 No facial asymmetry  8 Intact hearing  9,10 Palate symmetric, normal gag  11 Good shoulder shrug  12 Tongue Midline  Gait: Stable, No ataxia, can perform tandem walking  Coordination: essential head and b/l hand tremors     Sensory: Intact, Symmetric to Pinprick, Light Touch, Vibration, and Joint Position  Muscle Tone: Normal  Muscle exam  Arm Right Left Leg Right Left   Deltoid 5/5 5/5 Iliopsoas 5/5 5/5   Biceps 5/5 5/5 Quads 5/5 5/5   Triceps 5/5 5/5 Hamstrings 5/5 5/5   Wrist Extension 5/5 5/5 Ankle Dorsi Flexion 5/5 5/5   Wrist Flexion 5/5 5/5 Ankle Plantar Flexion 5/5 5/5   Interossei 5/5 5/5 Ankle Eversion 5/5 5/5   APB 5/5 5/5 Ankle Inversion 5/5 5/5       Reflexes   RJ BJ TJ KJ AJ Plantars [place]'s   Right 2+ 2+ 2+ 2+ 2+ Downgoing Not present   Left 2+ 2+ 2+ 2+ 2+ Downgoing Not present         Personal review of              Assessment/Plan:     1  Akathisia  Ambulatory referral to Neurology    clonazePAM (KlonoPIN) 0 5 mg tablet   2  Dyskinesia  Ambulatory referral to Neurology    clonazePAM (KlonoPIN) 0 5 mg tablet   3  Peripheral vertigo, unspecified laterality  meclizine (ANTIVERT) 25 mg tablet   4  Essential tremor  Ambulatory referral to Neurology         Episodes of dizziness appear to be from peripheral etiology  Since she doesn't have sxs anymore, will hold off on referring her to balance center  Will prescribe prn meclizine  As far as tremors are concerned, it seems that there is more to it than just essential tremor  I'm referring her to one of our movement specialists for further evaluation and treatment  For now will try low dose klonopin to see if her akathisia gets better  Counseling Documentation:  The patient and/or patient's family were  counseled regarding diagnostic results  Instructions for management,risk factor reductions,prognosis of disease were discussed  Patient and family were educated regarding impressions,risks and benefits of treatment options,importance of compliance with treatment  Total time of encounter: 45 min   More than 50% of time was spent in counseling and coordination of care of patient  KATHY Moses    [facility]  [phone]  [place] [address]

## 2018-09-20 ENCOUNTER — HOSPITAL ENCOUNTER (OUTPATIENT)
Dept: RADIOLOGY | Facility: HOSPITAL | Age: 73
Discharge: HOME | End: 2018-09-20
Attending: FAMILY MEDICINE
Payer: COMMERCIAL

## 2018-09-20 DIAGNOSIS — R42 DIZZINESS: ICD-10-CM

## 2018-09-20 DIAGNOSIS — G25.0 ESSENTIAL TREMOR: ICD-10-CM

## 2018-09-20 PROCEDURE — 70450 CT HEAD/BRAIN W/O DYE: CPT

## 2018-09-26 ENCOUNTER — TELEPHONE (OUTPATIENT)
Dept: NEUROLOGY | Facility: CLINIC | Age: 73
End: 2018-09-26

## 2018-09-26 DIAGNOSIS — G24.9 DYSKINESIA: ICD-10-CM

## 2018-09-26 DIAGNOSIS — G25.71 AKATHISIA: ICD-10-CM

## 2018-09-26 RX ORDER — CLONAZEPAM 0.5 MG/1
TABLET ORAL
Qty: 180 TABLET | Refills: 1 | Status: SHIPPED | OUTPATIENT
Start: 2018-09-26 | End: 2019-03-07

## 2018-09-26 NOTE — TELEPHONE ENCOUNTER
The patient is calling with a status update on her tremors  She is doing well on the Klonopin and would like a 90 day supply with refills sent to [facility]  She also states that it has helped her bowling because she is no longer shaking

## 2018-10-10 ENCOUNTER — OFFICE VISIT (OUTPATIENT)
Dept: NEUROLOGY | Facility: CLINIC | Age: 73
End: 2018-10-10
Payer: COMMERCIAL

## 2018-10-10 VITALS
BODY MASS INDEX: 19.18 KG/M2 | SYSTOLIC BLOOD PRESSURE: 120 MMHG | DIASTOLIC BLOOD PRESSURE: 70 MMHG | HEIGHT: 64 IN | HEART RATE: 118 BPM

## 2018-10-10 DIAGNOSIS — G25.0 ESSENTIAL TREMOR: ICD-10-CM

## 2018-10-10 PROBLEM — G24.3 CERVICAL DYSTONIA: Status: ACTIVE | Noted: 2018-10-10

## 2018-10-10 PROCEDURE — 99214 OFFICE O/P EST MOD 30 MIN: CPT | Performed by: PSYCHIATRY & NEUROLOGY

## 2018-10-10 RX ORDER — ZONISAMIDE 25 MG/1
25 CAPSULE ORAL DAILY
Qty: 60 CAPSULE | Refills: 6 | Status: SHIPPED | OUTPATIENT
Start: 2018-10-10 | End: 2019-03-07

## 2018-10-10 NOTE — PATIENT INSTRUCTIONS
Lets try a new medication for your tremor call zonisamide  Start with 1 capsule before bed  In 2 weeks you can increase to 2 capsules

## 2018-10-10 NOTE — PROGRESS NOTES
Assessment/Plan:    Cervical dystonia  The patient is a 68-year-old left-handed woman who presents today for evaluation of her head and bilateral arm tremor which started in mid 1970s  The patient's tremor is unusual for essential tremor  Her head tremor is large amplitude, jerky and irregular, most consistent with a dystonic tremor  She has a sensory trick which calms the tremor  Her tremor was improved with Botox injections into her neck in the past but complicated by weakness from over treatment  The patient's appendicular tremor is significantly worse on the left than the right, being a course large amplitude (up to 10cm) postural tremor on the left, worse in wing beating position  Posture>action  She has no tremor at rest or any evidence for parkinsonism  The tremor in her arms is either a continuation of her dystonic tremor or separate essential tremor  The patient does have some occasional small amplitude fidgeting movements (face, bilateral arms and legs)  which may be choreoathetoid however the majority of her movements seemed to stem from her large amplitude head movements  The patient has no family history of movement disorders and the slowly progressive and longstanding nature of her movements makes most etiologies of chorea unlikely  Akathisia is an imner sense of restlessness, which the patient denies  She has found some improvement from q h s  Klonopin, which has improved her sleep and calmed her head movements  We discussed the possibility Artane which can be helpful both for tremor and dystonia however the side effect profile was unfavorable given her age  We decided on a trial of zonisamide which can be helpful for tremor disorders  Follow up in one month       Diagnoses and all orders for this visit:    Essential tremor  -     Ambulatory referral to Neurology  -     zonisamide (ZONEGRAN) 25 mg capsule;  Take 1 capsule (25 mg total) by mouth daily Start with 1 cap QHS and increase to 2 in 2 weeks    Confirm neuroleptic exposure  Subjective:     Patient ID: Elsa Bar is a 74 y o  female  Presents alone  I had the pleasure of seeing your patient, Elsa Bar in the Movement Disorders Clinic at the [facility]  The patient is a 74y o  year old left handed female who presents for evaluation of head and neck tremor  The patient reports that her head tremor started in the mid 1970s and was probably diagnosed around 1976 as essential tremor  She was followed by a neurologist for many years and many medications were tried without successful control of her tremor  She moved to [facility] in 2004 and did see a neurologist again until 2014  At that time she was diagnosed with cervical dystonia and underwent 3 rounds of Botox treatment  The 1st treatment left the patient with a head droop requiring the use of a soft cervical collar  The 2nd was less useful and the 3rd seemed to exacerbate in alter head tremor  After the 1st Botox injection, once the head droop resolved, she did find that her head tremor was significantly improved, but not completely resolved  Looking back to her original diagnosis she recalls that the head tremor was her 1st symptom, later spreading into her hands  The tremor has been significantly worse on the left than the right, so much so that the patient was forced to start using her right hand even though she is left-handed  She reports that her symptoms have very slowly progressed over the course of decades  She does note a sensory trick where she can rest her chin gently on her hand and the tremor is suppressed  She was seen recently by 1 of our neurologists Dr William Tadeo and prescribed Klonopin to take at night  She has found this to be very helpful at reducing the tremor and helping her sleep      Previous medication trials:   - propranolol  - primidone   - topamax   - gabapentin     The following portions of the patient's history were reviewed and updated as appropriate: allergies, current medications, past family history, past medical history, past social history, past surgical history and problem list       Objective:      /70 (BP Location: Right arm, Patient Position: Sitting, Cuff Size: Standard)   Pulse (!) 118   Ht 5' 3 5" (1 613 m)   BMI 19 18 kg/m²         Physical Exam    Neurological Exam     GENERAL MEDICAL EXAMINATION:  General appearance: alert, in no apparent distress  Appropriately dressed and groomed  Conversing and interacting appropriately  Eyes: Sclera are non-injected  Ears, nose, Mouth, Throat: Mucous membranes are moist    Resp: Breathing comfortably on RA   Musculoskeletal: No evidence of deformities  No contractures  No Edema  Skin: No visible rashes  Warm and well perfused  Psych: normal and appropriate affect     NEUROLOGICAL EXAMINATION:  Mental Status: Alert and oriented to person place and time  Able to relate history without difficulty  Attentive: able to name NICK backward without difficulty  Language is fluent and appropriate with normal prosody  There were no paraphasic errors  Speech was not dysarthric  There was no pallilalia noted  Able to follow both midline and appendicular commands  Cranial Nerves:  II:  pupils equally round and briskly reactive to light, both directly and consensually  III-IV-VI: Full and conjugate, extra-ocular eye movements in all directions of gaze  No nystagmus or diplopia  Intact smooth pursuit  VII: Face is symmetric at rest and with activation; symmetric speed and excursion with smile  IX-X: Palate elevates symmetrically  XII: No tongue deviation or fasciculations    Motor: Normal muscle bulk throughout  Normal axial and appendicular tone in the arms and legs  No hypomimia was noted  Speech was not hypophonic  There was no vocal tremor  Rapid alternating movements revealed no bradykinesia, decrement or hesitations   No pronator drift or rebound  No asterixis or myoclonus noted  There is no tremor noted at rest   The patient had a large amplitude coarse postural tremor worse on the left and in the wing beating position, up to 5 cm on the right and 10 cm on the left  Her action tremor was up to 1 cm on the right and 3 cm on the left  The patient's head tremor was also course, irregular and of large amplitude  Her head tremor was forceful enough to cause movements in her torso itself  In addition, the patient had occasional fidgeting athetoid type movements in the bilateral arms and legs and in her face  A video of the patient's movements were taken with her permission  Strength:    Delt Bi    Tri  WE  FE    FF          C5   C6   C7   C6   C7 C8/T1     R 5    5      5      5      5     5          L 5    5      5      5      5     5               IP   Quad Hamst  TA      L2   L3   L4-S1    L4     R 5     5      5          5      L 5     5     5           5        Reflexes:  2+ and symmetric throughout    Coordination: Finger to nose without dysmetria bilaterally  Gait: Able to rise from a chair without the use of arms  Posture was normal  Narrow-base and stable stance  Normal initiation  Normal stride length, step height, arm swing  Review of Systems   Constitutional: Negative  Negative for appetite change and fever  HENT: Negative  Negative for hearing loss, tinnitus, trouble swallowing and voice change  Eyes: Negative  Negative for photophobia and pain  Respiratory: Negative  Negative for shortness of breath  Cardiovascular: Negative  Negative for palpitations  Gastrointestinal: Negative  Negative for nausea and vomiting  Endocrine: Negative  Negative for cold intolerance and heat intolerance  Genitourinary: Negative  Negative for dysuria, frequency and urgency  Musculoskeletal: Positive for neck pain (right side)  Negative for myalgias  Skin: Negative  Negative for rash  Neurological: Positive for tremors  Negative for dizziness, seizures, syncope, facial asymmetry, speech difficulty, weakness, light-headedness, numbness and headaches  Hematological: Bruises/bleeds easily  Psychiatric/Behavioral: Negative  Negative for confusion, hallucinations and sleep disturbance

## 2018-10-10 NOTE — ASSESSMENT & PLAN NOTE
The patient is a 40-year-old left-handed woman who presents today for evaluation of her head and bilateral arm tremor which started in mid 1970s  The patient's tremor is unusual for essential tremor  Her head tremor is large amplitude, jerky and irregular, most consistent with a dystonic tremor  She has a sensory trick which calms the tremor  Her tremor was improved with Botox injections into her neck in the past but complicated by weakness from over treatment  The patient's appendicular tremor is significantly worse on the left than the right, being a course large amplitude (up to 10cm) postural tremor on the left, worse in wing beating position  Posture>action  She has no tremor at rest or any evidence for parkinsonism  The tremor in her arms is either a continuation of her dystonic tremor or separate essential tremor  The patient does have some occasional small amplitude fidgeting movements (face, bilateral arms and legs)  which may be choreoathetoid however the majority of her movements seemed to stem from her large amplitude head movements  The patient has no family history of movement disorders and the slowly progressive and longstanding nature of her movements makes most etiologies of chorea unlikely  Akathisia is an imner sense of restlessness, which the patient denies  She has found some improvement from q h s  Klonopin, which has improved her sleep and calmed her head movements  We discussed the possibility Artane which can be helpful both for tremor and dystonia however the side effect profile was unfavorable given her age  We decided on a trial of zonisamide which can be helpful for tremor disorders       Follow up in one month

## 2018-10-10 NOTE — LETTER
October 10, 2018     Joice Press, [address] [facility] [address]    Patient: Pura Jordan   YOB: 1945   Date of Visit: 10/10/2018       Dear Dr Aicha Pretty: Thank you for referring Pura Jordan to me for evaluation  Below are my notes for this consultation  If you have questions, please do not hesitate to call me  She is a very interesting case  Thanks  Sincerely,        Nain Enamorado MD        CC: MD Nain Mcgowan MD  10/10/2018  3:04 PM  Sign at close encounter  Assessment/Plan:    Cervical dystonia  The patient is a 40-year-old left-handed woman who presents today for evaluation of her head and bilateral arm tremor which started in mid 1970s  The patient's tremor is unusual for essential tremor  Her head tremor is large amplitude, jerky and irregular, most consistent with a dystonic tremor  She has a sensory trick which calms the tremor  Her tremor was improved with Botox injections into her neck in the past but complicated by weakness from over treatment  The patient's appendicular tremor is significantly worse on the left than the right, being a course large amplitude (up to 10cm) postural tremor on the left, worse in wing beating position  Posture>action  She has no tremor at rest or any evidence for parkinsonism  The tremor in her arms is either a continuation of her dystonic tremor or separate essential tremor  The patient does have some occasional small amplitude fidgeting movements (face, bilateral arms and legs)  which may be choreoathetoid however the majority of her movements seemed to stem from her large amplitude head movements  The patient has no family history of movement disorders and the slowly progressive and longstanding nature of her movements makes most etiologies of chorea unlikely  Akathisia is an imner sense of restlessness, which the patient denies  She has found some improvement from q h s  Klonopin, which has improved her sleep and calmed her head movements  We discussed the possibility Artane which can be helpful both for tremor and dystonia however the side effect profile was unfavorable given her age  We decided on a trial of zonisamide which can be helpful for tremor disorders  Follow up in one month       Diagnoses and all orders for this visit:    Essential tremor  -     Ambulatory referral to Neurology  -     zonisamide (ZONEGRAN) 25 mg capsule; Take 1 capsule (25 mg total) by mouth daily Start with 1 cap QHS and increase to 2 in 2 weeks    Confirm neuroleptic exposure  Subjective:     Patient ID: Maryam Wade is a 74 y o  female  Presents alone  I had the pleasure of seeing your patient, Maryam Wade in the Movement Disorders Clinic at the [facility]  The patient is a 74y o  year old left handed female who presents for evaluation of head and neck tremor  The patient reports that her head tremor started in the mid 1970s and was probably diagnosed around 1976 as essential tremor  She was followed by a neurologist for many years and many medications were tried without successful control of her tremor  She moved to [facility] in 2004 and did see a neurologist again until 2014  At that time she was diagnosed with cervical dystonia and underwent 3 rounds of Botox treatment  The 1st treatment left the patient with a head droop requiring the use of a soft cervical collar  The 2nd was less useful and the 3rd seemed to exacerbate in alter head tremor  After the 1st Botox injection, once the head droop resolved, she did find that her head tremor was significantly improved, but not completely resolved  Looking back to her original diagnosis she recalls that the head tremor was her 1st symptom, later spreading into her hands   The tremor has been significantly worse on the left than the right, so much so that the patient was forced to start using her right hand even though she is left-handed  She reports that her symptoms have very slowly progressed over the course of decades  She does note a sensory trick where she can rest her chin gently on her hand and the tremor is suppressed  She was seen recently by 1 of our neurologists Dr Aicha Pretty and prescribed Klonopin to take at night  She has found this to be very helpful at reducing the tremor and helping her sleep  Previous medication trials:   - propranolol  - primidone   - topamax   - gabapentin     The following portions of the patient's history were reviewed and updated as appropriate: allergies, current medications, past family history, past medical history, past social history, past surgical history and problem list       Objective:      /70 (BP Location: Right arm, Patient Position: Sitting, Cuff Size: Standard)   Pulse (!) 118   Ht 5' 3 5" (1 613 m)   BMI 19 18 kg/m²          Physical Exam    Neurological Exam     GENERAL MEDICAL EXAMINATION:  General appearance: alert, in no apparent distress  Appropriately dressed and groomed  Conversing and interacting appropriately  Eyes: Sclera are non-injected  Ears, nose, Mouth, Throat: Mucous membranes are moist    Resp: Breathing comfortably on RA   Musculoskeletal: No evidence of deformities  No contractures  No Edema  Skin: No visible rashes  Warm and well perfused  Psych: normal and appropriate affect     NEUROLOGICAL EXAMINATION:  Mental Status: Alert and oriented to person place and time  Able to relate history without difficulty  Attentive: able to name NICK backward without difficulty  Language is fluent and appropriate with normal prosody  There were no paraphasic errors  Speech was not dysarthric  There was no pallilalia noted  Able to follow both midline and appendicular commands  Cranial Nerves:  II:  pupils equally round and briskly reactive to light, both directly and consensually     III-IV-VI: Full and conjugate, extra-ocular eye movements in all directions of gaze  No nystagmus or diplopia  Intact smooth pursuit  VII: Face is symmetric at rest and with activation; symmetric speed and excursion with smile  IX-X: Palate elevates symmetrically  XII: No tongue deviation or fasciculations    Motor: Normal muscle bulk throughout  Normal axial and appendicular tone in the arms and legs  No hypomimia was noted  Speech was not hypophonic  There was no vocal tremor  Rapid alternating movements revealed no bradykinesia, decrement or hesitations  No pronator drift or rebound  No asterixis or myoclonus noted  There is no tremor noted at rest   The patient had a large amplitude coarse postural tremor worse on the left and in the wing beating position, up to 5 cm on the right and 10 cm on the left  Her action tremor was up to 1 cm on the right and 3 cm on the left  The patient's head tremor was also course, irregular and of large amplitude  Her head tremor was forceful enough to cause movements in her torso itself  In addition, the patient had occasional fidgeting athetoid type movements in the bilateral arms and legs and in her face  A video of the patient's movements were taken with her permission  Strength:    Delt Bi    Tri  WE  FE    FF          C5   C6   C7   C6   C7 C8/T1     R 5    5      5      5      5     5          L 5    5      5      5      5     5               IP   Quad Hamst  TA      L2   L3   L4-S1    L4     R 5     5      5          5      L 5     5     5           5        Reflexes:  2+ and symmetric throughout    Coordination: Finger to nose without dysmetria bilaterally  Gait: Able to rise from a chair without the use of arms  Posture was normal  Narrow-base and stable stance  Normal initiation  Normal stride length, step height, arm swing  Review of Systems   Constitutional: Negative  Negative for appetite change and fever  HENT: Negative    Negative for hearing loss, tinnitus, trouble swallowing and voice change  Eyes: Negative  Negative for photophobia and pain  Respiratory: Negative  Negative for shortness of breath  Cardiovascular: Negative  Negative for palpitations  Gastrointestinal: Negative  Negative for nausea and vomiting  Endocrine: Negative  Negative for cold intolerance and heat intolerance  Genitourinary: Negative  Negative for dysuria, frequency and urgency  Musculoskeletal: Positive for neck pain (right side)  Negative for myalgias  Skin: Negative  Negative for rash  Neurological: Positive for tremors  Negative for dizziness, seizures, syncope, facial asymmetry, speech difficulty, weakness, light-headedness, numbness and headaches  Hematological: Bruises/bleeds easily  Psychiatric/Behavioral: Negative  Negative for confusion, hallucinations and sleep disturbance

## 2018-10-29 ENCOUNTER — TELEPHONE (OUTPATIENT)
Dept: NEUROLOGY | Facility: CLINIC | Age: 73
End: 2018-10-29

## 2018-10-29 NOTE — TELEPHONE ENCOUNTER
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

## 2018-10-30 ENCOUNTER — TRANSCRIBE ORDERS (OUTPATIENT)
Dept: ADMINISTRATIVE | Facility: HOSPITAL | Age: 73
End: 2018-10-30

## 2018-10-30 DIAGNOSIS — Z12.31 VISIT FOR SCREENING MAMMOGRAM: Primary | ICD-10-CM

## 2018-10-31 ENCOUNTER — TELEPHONE (OUTPATIENT)
Dept: FAMILY MEDICINE CLINIC | Facility: CLINIC | Age: 73
End: 2018-10-31

## 2018-10-31 DIAGNOSIS — Z12.39 SCREENING BREAST EXAMINATION: Primary | ICD-10-CM

## 2018-11-08 ENCOUNTER — HOSPITAL ENCOUNTER (OUTPATIENT)
Dept: RADIOLOGY | Facility: HOSPITAL | Age: 73
Discharge: HOME | End: 2018-11-08
Attending: INTERNAL MEDICINE
Payer: COMMERCIAL

## 2018-11-08 DIAGNOSIS — Z12.31 VISIT FOR SCREENING MAMMOGRAM: ICD-10-CM

## 2018-11-08 PROCEDURE — 77067 SCR MAMMO BI INCL CAD: CPT

## 2018-11-08 PROCEDURE — 77063 BREAST TOMOSYNTHESIS BI: CPT

## 2018-11-14 ENCOUNTER — OFFICE VISIT (OUTPATIENT)
Dept: NEUROLOGY | Facility: CLINIC | Age: 73
End: 2018-11-14
Payer: COMMERCIAL

## 2018-11-14 VITALS
HEART RATE: 87 BPM | WEIGHT: 108.2 LBS | HEIGHT: 64 IN | SYSTOLIC BLOOD PRESSURE: 120 MMHG | BODY MASS INDEX: 18.47 KG/M2 | DIASTOLIC BLOOD PRESSURE: 78 MMHG

## 2018-11-14 DIAGNOSIS — G25.0 ESSENTIAL TREMOR: ICD-10-CM

## 2018-11-14 DIAGNOSIS — G24.3 CERVICAL DYSTONIA: Primary | ICD-10-CM

## 2018-11-14 PROCEDURE — 4040F PNEUMOC VAC/ADMIN/RCVD: CPT | Performed by: PSYCHIATRY & NEUROLOGY

## 2018-11-14 PROCEDURE — 99214 OFFICE O/P EST MOD 30 MIN: CPT | Performed by: PSYCHIATRY & NEUROLOGY

## 2018-11-14 NOTE — ASSESSMENT & PLAN NOTE
The patient is a 76-year-old woman with a slowly progressive bilateral arm and head tremor which started in the 70s, worse on the left  Patient demonstrated a dramatic improvement in her tremor from a low dose of zonisamide  She is now able to write with her left hand which she has been able to do in years  Her head tremor is also somewhat improved  We discussed the option of increasing her zonisamide dose but she would like to hold off for now given the gains on the current dose

## 2018-11-14 NOTE — PROGRESS NOTES
Assessment/Plan:    Essential tremor  The patient is a 49-year-old woman with a slowly progressive bilateral arm and head tremor which started in the 70s, worse on the left  Patient demonstrated a dramatic improvement in her tremor from a low dose of zonisamide  She is now able to write with her left hand which she has been able to do in years  Her head tremor is also somewhat improved  We discussed the option of increasing her zonisamide dose but she would like to hold off for now given the gains on the current dose  Cervical dystonia  The patient's dystonic head tremor is also somewhat improved with the zonisamide but still very significant  The patient would like to pursue treatment with Botox, which was helpful in the past     Probable injection sites and doses:  R Splenious capitis: 30 units  L Splenious capitis: 20 units  Total of 50 units        Diagnoses and all orders for this visit:    Cervical dystonia    Essential tremor        Subjective:     Patient ID: Pura Jordan is a 74 y o  female  I had the pleasure of seeing your patient, Pura Jordan in the [facility] at the [address] [place] [address],[address] for Neuroscience  Pura Jordan is a 49-year-old left-handed woman who presents in follow up for her head and bilateral arm tremor which started in mid 1970s  The patient's tremor is unusual for essential tremor  Her head tremor is large amplitude, jerky and irregular, most consistent with a dystonic tremor  She has a sensory trick which calms the tremor  Her tremor was improved with Botox injections into her neck in the past but complicated by weakness from over treatment  She found some relief from Klonopin QHS  When she was last here we started zonisamide to see if this would help reduce her tremor  She currently uses her right hand for most things  The patient reports a major improvement in her tremor   She is now able to write with her left hand which she hasn't been able to do in over a year  Others think that her head movements are improved as well  She can now use the left hand to cut meat  Previous medication trials:   - propranolol  - primidone   - topamax   - gabapentin     The following portions of the patient's history were reviewed and updated as appropriate: allergies, current medications, past family history, past medical history, past social history, past surgical history and problem list       Objective:      /78 (BP Location: Left arm, Patient Position: Sitting, Cuff Size: Standard)   Pulse 87   Ht 5' 3 5" (1 613 m)   Wt 49 1 kg (108 lb 3 2 oz)   BMI 18 87 kg/m²         Physical Exam    Neurological Exam    GENERAL MEDICAL EXAMINATION:  General appearance: alert, in no apparent distress  Appropriately dressed and groomed  Conversing and interacting appropriately  Eyes: Sclera are non-injected  Ears, nose, Mouth, Throat: Mucous membranes are moist    Resp: Breathing comfortably on RA   Musculoskeletal: No evidence of deformities  No contractures  No Edema  Skin: No visible rashes  Warm and well perfused  Psych: normal and appropriate affect     Mental Status:  Alert and oriented to person place and time  Able to relate history without difficulty  Attentive to conversation  Language is fluent and appropriate with normal prosody  There were no paraphasic errors  Speech was not dysarthric  Able to follow both midline and appendicular commands  General Neurology examination:   PERRL, EOMI, Face activates symmetrically  Normal bulk and tone throughout  Patient moves all 4 extremities equally and antigravity  No pronator drift  Finger to nose without dysmetria bilaterally  Head tremor is mostly yes/yes  Head turn to the right  Worsens with eyes closed  outstreched only tremor on the right 2cm  Wing beating tremor is only on the left up to 4cm  Action tremor is 1cm bl  Gait:   Able to rise from a chair without the use of arms  Posture was normal  Narrow-base and stable stance  Normal initiation  Normal stride length, step height, arm swing  Review of Systems   Constitutional: Negative  Negative for appetite change and fever  HENT: Negative  Negative for hearing loss, tinnitus, trouble swallowing and voice change  Eyes: Negative  Negative for photophobia and pain  Respiratory: Negative  Negative for shortness of breath  Cardiovascular: Negative  Negative for palpitations  Gastrointestinal: Negative  Negative for nausea and vomiting  Endocrine: Negative  Negative for cold intolerance and heat intolerance  Genitourinary: Negative  Negative for dysuria, frequency and urgency  Musculoskeletal: Negative  Negative for myalgias and neck pain  Skin: Negative  Negative for rash  Neurological: Positive for tremors  Negative for dizziness, seizures, syncope, facial asymmetry, speech difficulty, weakness, light-headedness, numbness and headaches  Hematological: Bruises/bleeds easily  Psychiatric/Behavioral: Positive for sleep disturbance  Negative for confusion and hallucinations

## 2018-11-14 NOTE — ASSESSMENT & PLAN NOTE
The patient's dystonic head tremor is also somewhat improved with the zonisamide but still very significant     The patient would like to pursue treatment with Botox, which was helpful in the past     Probable injection sites and doses:  R Splenious capitis: 30 units  L Splenious capitis: 20 units  Total of 50 units

## 2018-11-14 NOTE — LETTER
November 14, 2018     Stephen Modi MD  [address] [facility] [address]    Patient: Darrick Lisa   YOB: 1945   Date of Visit: 11/14/2018       Dear Dr Khadijah Madsen:    Thank you for referring Darrick Lisa to me for evaluation  Below are my notes for this consultation  If you have questions, please do not hesitate to call me  I look forward to following your patient along with you  Sincerely,        Rosi Galvez MD        CC: No Recipients  Rosi Galvez MD  11/14/2018  5:04 PM  Sign at close encounter  Assessment/Plan:    Essential tremor  The patient is a 40-year-old woman with a slowly progressive bilateral arm and head tremor which started in the 70s, worse on the left  Patient demonstrated a dramatic improvement in her tremor from a low dose of zonisamide  She is now able to write with her left hand which she has been able to do in years  Her head tremor is also somewhat improved  We discussed the option of increasing her zonisamide dose but she would like to hold off for now given the gains on the current dose  Cervical dystonia  The patient's dystonic head tremor is also somewhat improved with the zonisamide but still very significant  The patient would like to pursue treatment with Botox, which was helpful in the past     Probable injection sites and doses:  R Splenious capitis: 30 units  L Splenious capitis: 20 units  Total of 50 units        Diagnoses and all orders for this visit:    Cervical dystonia    Essential tremor        Subjective:     Patient ID: Darrick Lisa is a 74 y o  female  I had the pleasure of seeing your patient, Darrick Lisa in the [facility] Clinic at the [address] [place] [address],[address] for Neuroscience  Darrick Lisa is a 40-year-old left-handed woman who presents in follow up for her head and bilateral arm tremor which started in mid 1970s  The patient's tremor is unusual for essential tremor   Her head tremor is large amplitude, jerky and irregular, most consistent with a dystonic tremor  She has a sensory trick which calms the tremor  Her tremor was improved with Botox injections into her neck in the past but complicated by weakness from over treatment  She found some relief from Klonopin QHS  When she was last here we started zonisamide to see if this would help reduce her tremor  She currently uses her right hand for most things  The patient reports a major improvement in her tremor  She is now able to write with her left hand which she hasn't been able to do in over a year  Others think that her head movements are improved as well  She can now use the left hand to cut meat  Previous medication trials:   - propranolol  - primidone   - topamax   - gabapentin     The following portions of the patient's history were reviewed and updated as appropriate: allergies, current medications, past family history, past medical history, past social history, past surgical history and problem list       Objective:      /78 (BP Location: Left arm, Patient Position: Sitting, Cuff Size: Standard)   Pulse 87   Ht 5' 3 5" (1 613 m)   Wt 49 1 kg (108 lb 3 2 oz)   BMI 18 87 kg/m²          Physical Exam    Neurological Exam    GENERAL MEDICAL EXAMINATION:  General appearance: alert, in no apparent distress  Appropriately dressed and groomed  Conversing and interacting appropriately  Eyes: Sclera are non-injected  Ears, nose, Mouth, Throat: Mucous membranes are moist    Resp: Breathing comfortably on RA   Musculoskeletal: No evidence of deformities  No contractures  No Edema  Skin: No visible rashes  Warm and well perfused  Psych: normal and appropriate affect     Mental Status:  Alert and oriented to person place and time  Able to relate history without difficulty  Attentive to conversation  Language is fluent and appropriate with normal prosody  There were no paraphasic errors  Speech was not dysarthric   Able to follow both midline and appendicular commands  General Neurology examination:   PERRL, EOMI, Face activates symmetrically  Normal bulk and tone throughout  Patient moves all 4 extremities equally and antigravity  No pronator drift  Finger to nose without dysmetria bilaterally  Head tremor is mostly yes/yes  Head turn to the right  Worsens with eyes closed  outstreched only tremor on the right 2cm  Wing beating tremor is only on the left up to 4cm  Action tremor is 1cm bl  Gait:   Able to rise from a chair without the use of arms  Posture was normal  Narrow-base and stable stance  Normal initiation  Normal stride length, step height, arm swing  Review of Systems   Constitutional: Negative  Negative for appetite change and fever  HENT: Negative  Negative for hearing loss, tinnitus, trouble swallowing and voice change  Eyes: Negative  Negative for photophobia and pain  Respiratory: Negative  Negative for shortness of breath  Cardiovascular: Negative  Negative for palpitations  Gastrointestinal: Negative  Negative for nausea and vomiting  Endocrine: Negative  Negative for cold intolerance and heat intolerance  Genitourinary: Negative  Negative for dysuria, frequency and urgency  Musculoskeletal: Negative  Negative for myalgias and neck pain  Skin: Negative  Negative for rash  Neurological: Positive for tremors  Negative for dizziness, seizures, syncope, facial asymmetry, speech difficulty, weakness, light-headedness, numbness and headaches  Hematological: Bruises/bleeds easily  Psychiatric/Behavioral: Positive for sleep disturbance  Negative for confusion and hallucinations

## 2018-11-15 ENCOUNTER — TELEPHONE (OUTPATIENT)
Dept: NEUROLOGY | Facility: CLINIC | Age: 73
End: 2018-11-15

## 2018-11-15 NOTE — TELEPHONE ENCOUNTER
Pt called re: botox  Pt states she called [facility] and ordered it  Per pt, [facility] is requesting rx (botox) be fax @ [phone]  Pls see pt email in epic dated 11/14/18                     Adilene Rose [phone]  Fax# [phone]

## 2018-11-19 ENCOUNTER — TELEPHONE (OUTPATIENT)
Dept: NEUROLOGY | Facility: CLINIC | Age: 73
End: 2018-11-19

## 2019-02-02 LAB
ALBUMIN SERPL-MCNC: 4.5 G/DL (ref 3.6–5.1)
ALBUMIN/GLOB SERPL: 1.9 (CALC) (ref 1–2.5)
ALP SERPL-CCNC: 93 U/L (ref 33–130)
ALT SERPL-CCNC: 19 U/L (ref 6–29)
AST SERPL-CCNC: 21 U/L (ref 10–35)
BASOPHILS # BLD AUTO: 50 CELLS/UL (ref 0–200)
BASOPHILS NFR BLD AUTO: 0.7 %
BILIRUB SERPL-MCNC: 0.5 MG/DL (ref 0.2–1.2)
BUN SERPL-MCNC: 9 MG/DL (ref 7–25)
BUN/CREAT SERPL: 9 (CALC) (ref 6–22)
CALCIUM SERPL-MCNC: 9.5 MG/DL (ref 8.6–10.4)
CHLORIDE SERPL-SCNC: 102 MMOL/L (ref 98–110)
CHOLEST SERPL-MCNC: 158 MG/DL
CHOLEST/HDLC SERPL: 2.1 (CALC)
CO2 SERPL-SCNC: 25 MMOL/L (ref 20–32)
CREAT SERPL-MCNC: 1.04 MG/DL (ref 0.6–0.93)
EOSINOPHIL # BLD AUTO: 178 CELLS/UL (ref 15–500)
EOSINOPHIL NFR BLD AUTO: 2.5 %
ERYTHROCYTE [DISTWIDTH] IN BLOOD BY AUTOMATED COUNT: 12.5 % (ref 11–15)
GLOBULIN SER CALC-MCNC: 2.4 G/DL (CALC) (ref 1.9–3.7)
GLUCOSE SERPL-MCNC: 113 MG/DL (ref 65–99)
HCT VFR BLD AUTO: 37.2 % (ref 35–45)
HDLC SERPL-MCNC: 76 MG/DL
HGB BLD-MCNC: 12.7 G/DL (ref 11.7–15.5)
LDLC SERPL CALC-MCNC: 63 MG/DL (CALC)
LYMPHOCYTES # BLD AUTO: 1903 CELLS/UL (ref 850–3900)
LYMPHOCYTES NFR BLD AUTO: 26.8 %
MCH RBC QN AUTO: 31 PG (ref 27–33)
MCHC RBC AUTO-ENTMCNC: 34.1 G/DL (ref 32–36)
MCV RBC AUTO: 90.7 FL (ref 80–100)
MONOCYTES # BLD AUTO: 703 CELLS/UL (ref 200–950)
MONOCYTES NFR BLD AUTO: 9.9 %
NEUTROPHILS # BLD AUTO: 4267 CELLS/UL (ref 1500–7800)
NEUTROPHILS NFR BLD AUTO: 60.1 %
NONHDLC SERPL-MCNC: 82 MG/DL (CALC)
PLATELET # BLD AUTO: 365 THOUSAND/UL (ref 140–400)
PMV BLD REES-ECKER: 9.4 FL (ref 7.5–12.5)
POTASSIUM SERPL-SCNC: 4 MMOL/L (ref 3.5–5.3)
PROT SERPL-MCNC: 6.9 G/DL (ref 6.1–8.1)
RBC # BLD AUTO: 4.1 MILLION/UL (ref 3.8–5.1)
SL AMB EGFR AFRICAN AMERICAN: 61 ML/MIN/1.73M2
SL AMB EGFR NON AFRICAN AMERICAN: 53 ML/MIN/1.73M2
SODIUM SERPL-SCNC: 135 MMOL/L (ref 135–146)
TRIGL SERPL-MCNC: 107 MG/DL
WBC # BLD AUTO: 7.1 THOUSAND/UL (ref 3.8–10.8)

## 2019-02-04 NOTE — PROGRESS NOTES
Subjective:      Patient ID: Yoko Denton is a 74 y o  female  Chief Complaint   Patient presents with    Follow-up     review bloodwork       Here for bloodwork review  She is upset that her glucose is still high and would like to ask about her creatinine  This is mildly elevated at 1 03 but has been consistent for several years  She feels well and remains active and has really been watching her diet  The following portions of the patient's history were reviewed and updated as appropriate: allergies, current medications, past family history, past medical history, past social history, past surgical history and problem list     Review of Systems   Constitutional: Negative  Respiratory: Negative  Cardiovascular: Negative  Current Outpatient Prescriptions   Medication Sig Dispense Refill    atorvastatin (LIPITOR) 10 mg tablet Take 1 tablet (10 mg total) by mouth daily 90 tablet 3    brinzolamide (AZOPT) 1 % ophthalmic suspension Apply to eye      clonazePAM (KlonoPIN) 0 5 mg tablet Take 1 tablet at night for 1 week and if it's helping take 1 tab twice daily  180 tablet 1    levothyroxine 50 mcg tablet Take 1 tablet (50 mcg total) by mouth daily 90 tablet 3    travoprost (TRAVATAN Z) 0 004 % ophthalmic solution Apply to eye      zonisamide (ZONEGRAN) 25 mg capsule Take 1 capsule (25 mg total) by mouth daily Start with 1 cap QHS and increase to 2 in 2 weeks 60 capsule 6    meclizine (ANTIVERT) 25 mg tablet Take 1 tablet (25 mg total) by mouth every 8 (eight) hours as needed for dizziness (Patient not taking: Reported on 2/7/2019 ) 30 tablet 0     No current facility-administered medications for this visit  Objective:    /60   Pulse 88   Temp 97 9 °F (36 6 °C)   Resp 16   Ht 5' 3 5" (1 613 m)   Wt 49 kg (108 lb)   BMI 18 83 kg/m²        Physical Exam   Constitutional: She appears well-developed and well-nourished     Eyes: Conjunctivae are normal    Neck: Neck supple  No JVD present  No thyromegaly present  Cardiovascular: Normal rate, regular rhythm, normal heart sounds and intact distal pulses  Exam reveals no gallop and no friction rub  No murmur heard  Pulmonary/Chest: Effort normal and breath sounds normal  She has no wheezes  She has no rales  Abdominal: Soft  Bowel sounds are normal  She exhibits no distension  There is no tenderness  Musculoskeletal: She exhibits no edema  Assessment/Plan:    Hypothyroidism  Clinically euthyroid and will check TSH in 6 months  Continue levothyroxine  Mixed hyperlipidemia  Reviewed labs and lipids are within goal on atorvastatin, will continue  Encouraged low fat diet and continued exercise  Elevated serum creatinine  This has been stable and will continue to follow  Discussed nephrology referral and if thing change she would like to go  Elevated glucose  A1C is now nearly normal and she will continue exercise and dietary restrictions  Diagnoses and all orders for this visit:    Hypothyroidism, unspecified type  -     TSH, 3rd generation with Free T4 reflex; Future  -     TSH, 3rd generation with Free T4 reflex    Mixed hyperlipidemia  -     CBC and differential; Future  -     Comprehensive metabolic panel; Future  -     Lipid panel; Future  -     CBC and differential  -     Comprehensive metabolic panel  -     Lipid panel    Elevated glucose  -     HEMOGLOBIN A1C W/ EAG ESTIMATION; Future    Elevated serum creatinine  -     CBC and differential; Future  -     Comprehensive metabolic panel; Future  -     Lipid panel; Future  -     CBC and differential  -     Comprehensive metabolic panel  -     Lipid panel          Return in about 6 months (around 8/7/2019) for 1/2 hour for AWV and follow up         Chapis Mak MD

## 2019-02-07 ENCOUNTER — OFFICE VISIT (OUTPATIENT)
Dept: FAMILY MEDICINE CLINIC | Facility: CLINIC | Age: 74
End: 2019-02-07
Payer: COMMERCIAL

## 2019-02-07 VITALS
HEIGHT: 64 IN | SYSTOLIC BLOOD PRESSURE: 100 MMHG | DIASTOLIC BLOOD PRESSURE: 60 MMHG | BODY MASS INDEX: 18.44 KG/M2 | TEMPERATURE: 97.9 F | HEART RATE: 88 BPM | RESPIRATION RATE: 16 BRPM | WEIGHT: 108 LBS

## 2019-02-07 DIAGNOSIS — E78.2 MIXED HYPERLIPIDEMIA: ICD-10-CM

## 2019-02-07 DIAGNOSIS — E03.9 HYPOTHYROIDISM, UNSPECIFIED TYPE: Primary | ICD-10-CM

## 2019-02-07 DIAGNOSIS — R79.89 ELEVATED SERUM CREATININE: ICD-10-CM

## 2019-02-07 DIAGNOSIS — R73.09 ELEVATED GLUCOSE: ICD-10-CM

## 2019-02-07 PROCEDURE — 99214 OFFICE O/P EST MOD 30 MIN: CPT | Performed by: INTERNAL MEDICINE

## 2019-02-07 NOTE — ASSESSMENT & PLAN NOTE
This has been stable and will continue to follow  Discussed nephrology referral and if thing change she would like to go

## 2019-02-07 NOTE — ASSESSMENT & PLAN NOTE
Reviewed labs and lipids are within goal on atorvastatin, will continue  Encouraged low fat diet and continued exercise

## 2019-03-07 ENCOUNTER — OFFICE VISIT (OUTPATIENT)
Dept: NEUROLOGY | Facility: CLINIC | Age: 74
End: 2019-03-07
Payer: COMMERCIAL

## 2019-03-07 VITALS
HEIGHT: 64 IN | HEART RATE: 86 BPM | WEIGHT: 108 LBS | SYSTOLIC BLOOD PRESSURE: 122 MMHG | DIASTOLIC BLOOD PRESSURE: 82 MMHG | BODY MASS INDEX: 18.44 KG/M2

## 2019-03-07 DIAGNOSIS — G24.3 CERVICAL DYSTONIA: ICD-10-CM

## 2019-03-07 DIAGNOSIS — G25.0 ESSENTIAL TREMOR: Primary | ICD-10-CM

## 2019-03-07 PROCEDURE — 99214 OFFICE O/P EST MOD 30 MIN: CPT | Performed by: PSYCHIATRY & NEUROLOGY

## 2019-03-07 RX ORDER — ZONISAMIDE 50 MG/1
50 CAPSULE ORAL DAILY
Qty: 90 CAPSULE | Refills: 3 | Status: SHIPPED | OUTPATIENT
Start: 2019-03-07 | End: 2019-03-07 | Stop reason: SDUPTHER

## 2019-03-07 RX ORDER — ZONISAMIDE 50 MG/1
50 CAPSULE ORAL
Qty: 90 CAPSULE | Refills: 3 | Status: SHIPPED | OUTPATIENT
Start: 2019-03-07 | End: 2020-06-23 | Stop reason: SDUPTHER

## 2019-03-07 RX ORDER — CLONAZEPAM 0.5 MG/1
0.5 TABLET ORAL 2 TIMES DAILY
Qty: 180 TABLET | Refills: 3 | Status: SHIPPED | OUTPATIENT
Start: 2019-03-07 | End: 2020-06-23 | Stop reason: SDUPTHER

## 2019-03-07 NOTE — ASSESSMENT & PLAN NOTE
Dramatic improvement in the patient's tremor with the addition of zonisamide  She continues to have some bothersome head tremor which is the result of her cervical dystonia  We will try again to get approval for Botox  We will continue the zonisamide at 50 mg  We discussed the option of increasing this dose in the future needed  I also took over prescribing the patient's clonazepam but encouraged her to try weaning the dose to daily, with the goal of discontinuing this medication in the future      Probable injection sites and doses:  R Splenious capitis: 30 units  L Splenious capitis: 20 units  Total of 50 units

## 2019-03-07 NOTE — LETTER
March 7, 2019     Venkat William MD  [address]  [address] [place] [address]    Patient: Jamison Hu   YOB: 1945   Date of Visit: 3/7/2019       Dear Dr Mabel Carty:    Thank you for referring Jamison Hu to me for evaluation  Below are my notes for this consultation  If you have questions, please do not hesitate to call me  I look forward to following your patient along with you  Sincerely,        Lindsay Ward MD        CC: No Recipients  Lindsay Ward MD  3/7/2019  6:29 PM  Sign at close encounter  Assessment/Plan:    Cervical dystonia  Dramatic improvement in the patient's tremor with the addition of zonisamide  She continues to have some bothersome head tremor which is the result of her cervical dystonia  We will try again to get approval for Botox  We will continue the zonisamide at 50 mg  We discussed the option of increasing this dose in the future needed  I also took over prescribing the patient's clonazepam but encouraged her to try weaning the dose to daily, with the goal of discontinuing this medication in the future  Probable injection sites and doses:  R Splenious capitis: 30 units  L Splenious capitis: 20 units  Total of 50 units      Diagnoses and all orders for this visit:    Essential tremor  -     Discontinue: zonisamide (ZONEGRAN) 50 MG capsule; Take 1 capsule (50 mg total) by mouth daily Start with 1 cap QHS and increase to 2 in 2 weeks  -     zonisamide (ZONEGRAN) 50 MG capsule; Take 1 capsule (50 mg total) by mouth daily at bedtime Start with 1 cap QHS and increase to 2 in 2 weeks    Cervical dystonia  -     clonazePAM (KlonoPIN) 0 5 mg tablet; Take 1 tablet (0 5 mg total) by mouth 2 (two) times a day        Subjective:     Patient ID: Jamison Hu is a 74 y o  female  I had the pleasure of seeing your patient, Jamison Hu in the Movement Disorders Clinic at the [facility] for Neuro[facility]        Jamison Hu is a 75-year-old left-handed woman who presents in follow up for her head and bilateral arm tremor which started in mid 1970s, most likely a dystonic tremor  The patient's tremor is unusual for essential tremor  Her head tremor is large amplitude, jerky and irregular  She has a sensory trick which calms the tremor  Her tremor was improved with Botox injections into her neck in the past but complicated by weakness from over treatment  She found some relief from Klonopin QHS  When I first saw her we started zonisamide which led to a dramatic improvement in her tremor  She was able to write with her left hand which she hasn't been able to do in over a year  Her head tremor seemed to improve as well  Previous medication trials:   - propranolol  - primidone   - topamax   - gabapentin     Interval history:  She has been tolerating the zonisamide well at 50 mg QHS  Her head tremor has continued to improve as has her ability to use the left hand  She is now able to eat and right much better than she was in the  She continues to stay very active, dancing and bowling  She denies the emergence of any new symptoms such as slowness, stiffness or walking troubles  She has had no falls denies swallowing troubles  No issues with driving or navigating  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  The following portions of the patient's history were reviewed and updated as appropriate: allergies, current medications, past family history, past medical history, past social history, past surgical history and problem list     Objective:    /82 (BP Location: Left arm, Patient Position: Sitting, Cuff Size: Standard)   Pulse 86   Ht 5' 3 5" (1 613 m)   Wt 49 kg (108 lb)   BMI 18 83 kg/m²      Physical Exam    Neurological Exam     GENERAL MEDICAL EXAMINATION:  General appearance: alert, in no apparent distress  Appropriately dressed and groomed  Conversing and interacting appropriately  Eyes: Sclera are non-injected  Ears, nose, Mouth, Throat: Mucous membranes are moist    Resp: Breathing comfortably on RA   Musculoskeletal: No evidence of deformities  No contractures  No Edema  Skin: No visible rashes  Warm and well perfused  Psych: normal and appropriate affect     Mental Status:  Alert and oriented to person place and time  Able to relate history without difficulty  Attentive to conversation  Language is fluent and appropriate with normal prosody  There were no paraphasic errors  Speech was not dysarthric  Able to follow both midline and appendicular commands  General Neurology examination:   EOMI, Face activates symmetrically  Normal bulk and tone throughout  Patient moves all 4 extremities equally and antigravity  Finger to nose without dysmetria bilaterally  No intention tremor  YURI were accurate and symmetric with regard to katelynn and speed  Dramatically improved head tremor and action tremor  Head movements are now occasional rather than near constant  Dystonic posturing of the neck is unchanged  Gait:   Able to rise from a chair without the use of arms  Posture was normal  Narrow-base and stable stance  Normal initiation  Normal stride length, step height, arm swing  Review of Systems   Constitutional: Negative  Negative for appetite change and fever  HENT: Negative  Negative for hearing loss, tinnitus, trouble swallowing and voice change  Eyes: Negative  Negative for photophobia and pain  Respiratory: Negative  Negative for shortness of breath  Cardiovascular: Negative  Negative for palpitations  Gastrointestinal: Negative  Negative for nausea and vomiting  Endocrine: Negative  Negative for cold intolerance and heat intolerance  Genitourinary: Negative  Negative for dysuria, frequency and urgency  Musculoskeletal: Negative  Negative for myalgias and neck pain  Skin: Negative  Negative for rash  Neurological: Positive for tremors   Negative for dizziness, seizures, syncope, facial asymmetry, speech difficulty, weakness, light-headedness, numbness and headaches  Hematological: Negative  Does not bruise/bleed easily  Psychiatric/Behavioral: Negative  Negative for confusion, hallucinations and sleep disturbance  The above ROS was reviewed and updated  Lukas Katz MD  Medical Director   [facility]  Movement and Memory Specialist       Current Outpatient Medications on File Prior to Visit   Medication Sig Dispense Refill    atorvastatin (LIPITOR) 10 mg tablet Take 1 tablet (10 mg total) by mouth daily 90 tablet 3    brinzolamide (AZOPT) 1 % ophthalmic suspension Apply to eye      levothyroxine 50 mcg tablet Take 1 tablet (50 mcg total) by mouth daily 90 tablet 3    travoprost (TRAVATAN Z) 0 004 % ophthalmic solution Apply to eye      [DISCONTINUED] clonazePAM (KlonoPIN) 0 5 mg tablet Take 1 tablet at night for 1 week and if it's helping take 1 tab twice daily  180 tablet 1    [DISCONTINUED] zonisamide (ZONEGRAN) 25 mg capsule Take 1 capsule (25 mg total) by mouth daily Start with 1 cap QHS and increase to 2 in 2 weeks 60 capsule 6    meclizine (ANTIVERT) 25 mg tablet Take 1 tablet (25 mg total) by mouth every 8 (eight) hours as needed for dizziness (Patient not taking: Reported on 2/7/2019 ) 30 tablet 0     No current facility-administered medications on file prior to visit

## 2019-03-07 NOTE — PROGRESS NOTES
Assessment/Plan:    Cervical dystonia  Dramatic improvement in the patient's tremor with the addition of zonisamide  She continues to have some bothersome head tremor which is the result of her cervical dystonia  We will try again to get approval for Botox  We will continue the zonisamide at 50 mg  We discussed the option of increasing this dose in the future needed  I also took over prescribing the patient's clonazepam but encouraged her to try weaning the dose to daily, with the goal of discontinuing this medication in the future  Probable injection sites and doses:  R Splenious capitis: 30 units  L Splenious capitis: 20 units  Total of 50 units      Diagnoses and all orders for this visit:    Essential tremor  -     Discontinue: zonisamide (ZONEGRAN) 50 MG capsule; Take 1 capsule (50 mg total) by mouth daily Start with 1 cap QHS and increase to 2 in 2 weeks  -     zonisamide (ZONEGRAN) 50 MG capsule; Take 1 capsule (50 mg total) by mouth daily at bedtime Start with 1 cap QHS and increase to 2 in 2 weeks    Cervical dystonia  -     clonazePAM (KlonoPIN) 0 5 mg tablet; Take 1 tablet (0 5 mg total) by mouth 2 (two) times a day        Subjective:     Patient ID: Chandan Prince is a 74 y o  female  I had the pleasure of seeing your patient, Chandan Prince in the Movement Disorders Clinic at the [facility]  Chandan Prince is a 68-year-old left-handed woman who presents in follow up for her head and bilateral arm tremor which started in mid 1970s, most likely a dystonic tremor  The patient's tremor is unusual for essential tremor  Her head tremor is large amplitude, jerky and irregular  She has a sensory trick which calms the tremor  Her tremor was improved with Botox injections into her neck in the past but complicated by weakness from over treatment  She found some relief from Klonopin QHS   When I first saw her we started zonisamide which led to a dramatic improvement in her tremor  She was able to write with her left hand which she hasn't been able to do in over a year  Her head tremor seemed to improve as well  Previous medication trials:   - propranolol  - primidone   - topamax   - gabapentin     Interval history:  She has been tolerating the zonisamide well at 50 mg QHS  Her head tremor has continued to improve as has her ability to use the left hand  She is now able to eat and right much better than she was in the  She continues to stay very active, dancing and bowling  She denies the emergence of any new symptoms such as slowness, stiffness or walking troubles  She has had no falls denies swallowing troubles  No issues with driving or navigating  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  The following portions of the patient's history were reviewed and updated as appropriate: allergies, current medications, past family history, past medical history, past social history, past surgical history and problem list     Objective:    /82 (BP Location: Left arm, Patient Position: Sitting, Cuff Size: Standard)   Pulse 86   Ht 5' 3 5" (1 613 m)   Wt 49 kg (108 lb)   BMI 18 83 kg/m²     Physical Exam    Neurological Exam     GENERAL MEDICAL EXAMINATION:  General appearance: alert, in no apparent distress  Appropriately dressed and groomed  Conversing and interacting appropriately  Eyes: Sclera are non-injected  Ears, nose, Mouth, Throat: Mucous membranes are moist    Resp: Breathing comfortably on RA   Musculoskeletal: No evidence of deformities  No contractures  No Edema  Skin: No visible rashes  Warm and well perfused  Psych: normal and appropriate affect     Mental Status:  Alert and oriented to person place and time  Able to relate history without difficulty  Attentive to conversation  Language is fluent and appropriate with normal prosody  There were no paraphasic errors  Speech was not dysarthric   Able to follow both midline and appendicular commands  General Neurology examination:   EOMI, Face activates symmetrically  Normal bulk and tone throughout  Patient moves all 4 extremities equally and antigravity  Finger to nose without dysmetria bilaterally  No intention tremor  YURI were accurate and symmetric with regard to katelynn and speed  Dramatically improved head tremor and action tremor  Head movements are now occasional rather than near constant  Dystonic posturing of the neck is unchanged  Gait:   Able to rise from a chair without the use of arms  Posture was normal  Narrow-base and stable stance  Normal initiation  Normal stride length, step height, arm swing  Review of Systems   Constitutional: Negative  Negative for appetite change and fever  HENT: Negative  Negative for hearing loss, tinnitus, trouble swallowing and voice change  Eyes: Negative  Negative for photophobia and pain  Respiratory: Negative  Negative for shortness of breath  Cardiovascular: Negative  Negative for palpitations  Gastrointestinal: Negative  Negative for nausea and vomiting  Endocrine: Negative  Negative for cold intolerance and heat intolerance  Genitourinary: Negative  Negative for dysuria, frequency and urgency  Musculoskeletal: Negative  Negative for myalgias and neck pain  Skin: Negative  Negative for rash  Neurological: Positive for tremors  Negative for dizziness, seizures, syncope, facial asymmetry, speech difficulty, weakness, light-headedness, numbness and headaches  Hematological: Negative  Does not bruise/bleed easily  Psychiatric/Behavioral: Negative  Negative for confusion, hallucinations and sleep disturbance  The above ROS was reviewed and updated       Nisa Cabral MD  Medical Director   [facility]  Movement and Memory Specialist       Current Outpatient Medications on File Prior to Visit   Medication Sig Dispense Refill    atorvastatin (LIPITOR) 10 mg tablet Take 1 tablet (10 mg total) by mouth daily 90 tablet 3    brinzolamide (AZOPT) 1 % ophthalmic suspension Apply to eye      levothyroxine 50 mcg tablet Take 1 tablet (50 mcg total) by mouth daily 90 tablet 3    travoprost (TRAVATAN Z) 0 004 % ophthalmic solution Apply to eye      [DISCONTINUED] clonazePAM (KlonoPIN) 0 5 mg tablet Take 1 tablet at night for 1 week and if it's helping take 1 tab twice daily  180 tablet 1    [DISCONTINUED] zonisamide (ZONEGRAN) 25 mg capsule Take 1 capsule (25 mg total) by mouth daily Start with 1 cap QHS and increase to 2 in 2 weeks 60 capsule 6    meclizine (ANTIVERT) 25 mg tablet Take 1 tablet (25 mg total) by mouth every 8 (eight) hours as needed for dizziness (Patient not taking: Reported on 2/7/2019 ) 30 tablet 0     No current facility-administered medications on file prior to visit

## 2019-04-17 ENCOUNTER — TELEPHONE (OUTPATIENT)
Dept: NEUROLOGY | Facility: CLINIC | Age: 74
End: 2019-04-17

## 2019-07-10 ENCOUNTER — OFFICE VISIT (OUTPATIENT)
Dept: NEUROLOGY | Facility: CLINIC | Age: 74
End: 2019-07-10
Payer: COMMERCIAL

## 2019-07-10 VITALS
BODY MASS INDEX: 18.78 KG/M2 | SYSTOLIC BLOOD PRESSURE: 140 MMHG | HEIGHT: 64 IN | WEIGHT: 110 LBS | DIASTOLIC BLOOD PRESSURE: 70 MMHG | HEART RATE: 70 BPM

## 2019-07-10 DIAGNOSIS — G24.3 CERVICAL DYSTONIA: ICD-10-CM

## 2019-07-10 DIAGNOSIS — G25.0 ESSENTIAL TREMOR: Primary | ICD-10-CM

## 2019-07-10 PROCEDURE — 99214 OFFICE O/P EST MOD 30 MIN: CPT | Performed by: PSYCHIATRY & NEUROLOGY

## 2019-07-10 NOTE — ASSESSMENT & PLAN NOTE
76-year-old woman presents in follow-up for upper extremity tremor and cervical dystonia with dystonic head tremor  Her arm tremors are dramatically improved on a stable dose of zonisamide  She continues to have bothersome head tremor that this is overall improved as well  Unfortunately the patient's insurance would not cover Botox  We will investigate prices out-of-pocket      Continue zonisamide and clonazepam as is

## 2019-07-10 NOTE — PROGRESS NOTES
Patient ID: David Enrique is a 66 y o  female  Assessment/Plan:    Essential tremor  69-year-old woman presents in follow-up for upper extremity tremor and cervical dystonia with dystonic head tremor  Her arm tremors are dramatically improved on a stable dose of zonisamide  She continues to have bothersome head tremor that this is overall improved as well  Unfortunately the patient's insurance would not cover Botox  We will investigate prices out-of-pocket  Continue zonisamide and clonazepam as is       Diagnoses and all orders for this visit:    Essential tremor    Cervical dystonia       The patient has abnormal posturing of the head and neck with turning and tilt of the head to the right  This is accompanied by discomfort in the neck  The significant head tremor, caused by the dystonia/posturing is socially limiting to the patient  It creates anxiety and she avoids certain social situations because of it  She has had this issue since the 1970s without relief  Subjective:    HPI    I had the pleasure of seeing your patient, David Enrique in the Movement Disorders Clinic at the [facility]  David Enrique is a 69-year-old left-handed woman who presents in follow up for her head and bilateral arm tremor which started in mid 1970s, most likely a dystonic tremor  The patient's tremor is unusual for essential tremor  Her head tremor is large amplitude, jerky and irregular  She has a sensory trick which calms the tremor  Her tremor was improved with Botox injections into her neck in the past but complicated by weakness from over treatment  She found some relief from Klonopin QHS  When I first saw her we started zonisamide which led to a dramatic improvement in her tremor  She was able to write with her left hand which she hasn't been able to do in over a year  Her head tremor seemed to improve as well        Previous medication trials:   - propranolol  - primidone   - topamax   - gabapentin     Current medications:  Zonisamide 50mg QHS  Clonopin 0 5mg BID     Interval history:  Tried to reduce her clonopin to daily but the tremor got worse  Staying very active  Still very bothered by her head tremor, embarased  Upset that her insurance wouldn't pay for the botox injections  She rates some concerns regarding the potential cognitive impact of her medications  She does have a family history dementia in her mother  We reviewed this in some detail  The following portions of the patient's history were reviewed and updated as appropriate: allergies, current medications, past family history, past medical history, past social history, past surgical history and problem list          Objective:    Blood pressure 140/70, pulse 70, height 5' 3 5" (1 613 m), weight 49 9 kg (110 lb)  Physical Exam    Neurological Exam    TETRAS:     Head Tremor: significant, jerky  Face Tremor: 2  Voice Tremor: 0  Upper limb tremor       R outstretched posture: 0       L outstretched posture: 1       R Wing Beatin       L Wing Beatincm       R Kinetic: 0       L Kinetic: 1 5cm  Lower limb tremor:        R: 0       L: 0  Archimedes Spirals:        R: 1       L: 2       ROS:  Review of Systems   Constitutional: Negative  Negative for appetite change and fever  HENT: Negative  Negative for hearing loss, tinnitus, trouble swallowing and voice change  Eyes: Negative  Negative for photophobia and pain  Respiratory: Negative  Negative for shortness of breath  Cardiovascular: Negative  Negative for palpitations  Gastrointestinal: Negative  Negative for nausea and vomiting  Endocrine: Negative  Negative for cold intolerance and heat intolerance  Genitourinary: Negative  Negative for dysuria, frequency and urgency  Musculoskeletal: Negative  Negative for myalgias and neck pain  Skin: Negative  Negative for rash  Neurological: Negative    Negative for dizziness, tremors, seizures, syncope, facial asymmetry, speech difficulty, weakness, light-headedness, numbness and headaches  Hematological: Negative  Does not bruise/bleed easily  Psychiatric/Behavioral: Negative  Negative for confusion, hallucinations and sleep disturbance  The above ROS was reviewed and updated       Can Gilliland MD  Medical Director   [facility]  Movement and Memory Specialist

## 2019-07-12 ENCOUNTER — TELEPHONE (OUTPATIENT)
Dept: NEUROLOGY | Facility: CLINIC | Age: 74
End: 2019-07-12

## 2019-07-12 NOTE — TELEPHONE ENCOUNTER
Per Antionette BASSETT for Botox to be submitted for patient  New start- cervical dystonia with Dr Robert Hernandez

## 2019-07-19 NOTE — TELEPHONE ENCOUNTER
Received a denial from patient's [employer] due to her not having Medicare part D coverage  I called [facility]- she states she does not see an authorization on file- she advised me to resubmit the authorization via fax  Forms re-faxed to [facility] on 7/19/19  Will await approval/ denial letter

## 2019-07-22 ENCOUNTER — TELEPHONE (OUTPATIENT)
Dept: NEUROLOGY | Facility: CLINIC | Age: 74
End: 2019-07-22

## 2019-07-22 NOTE — TELEPHONE ENCOUNTER
Received a fax from [address]  that a duplicate PA request has been submitted and that I has been dismissed due to previous denial       [facility]- spoke with Elisa Casey in the prior authorization department- she states she was able to pull up the denial  She states this was handeled through Specialty prescription operations department  She was able to provide me with the phone number before she transferred me: [phone] she states they will be able to better assist as so finding out why this was denied  Call reference #: [id number]    Fermín Tomlinson with Gladis Garcia in the specialty prescription operations department- she advised me that the patient's Botox was denied because the patient does not have pharmacy benefits through [facility]  She states the patient has pharmacy benefits through [facility]  She advised me to call them at [phone]  I confirmed with the representative that the patient has a Carve out plan with [address]  and does not have pharmacy benefits therefore we will need to go through [facility] to obtain the patient's Botox  She confirmed that was correct and confirmed no authorization is needed from [address]   Call reference #: [id number]

## 2019-07-22 NOTE — TELEPHONE ENCOUNTER
Jeanette,    Please schedule a new start Botox appointment with Dr Leonard Jensen  It will be specialty pharmacy   Once the patient is scheduled please let me know so I can attach the referral       Thank you,    Christine Antoine

## 2019-07-23 NOTE — TELEPHONE ENCOUNTER
Received ph call from pt - she is awaiting your call to schedule botox injection  Pt can be reached at [phone]    Thank you!     Lesia

## 2019-07-24 ENCOUNTER — TELEPHONE (OUTPATIENT)
Dept: NEUROLOGY | Facility: CLINIC | Age: 74
End: 2019-07-24

## 2019-07-24 NOTE — TELEPHONE ENCOUNTER
Gordon Avila,    Patient is scheduled  She is questioning the cost of the actual injection fee  She has to pay out of pocket and just wants a heads up      Thank you,  Phil Garza

## 2019-07-24 NOTE — TELEPHONE ENCOUNTER
Waiting c/b to schedule pt  Seen patient tried to reach me a bunch of times yesterday but I was on PTO  I will be waiting her call today

## 2019-07-24 NOTE — TELEPHONE ENCOUNTER
Called and spoke with the patient- I advised her that I did receive a denial for her Botox- but when I spoke to [facility] they inititally denied the patient's Botox because she does not have pharmacy benefits through [facility]  Patient confirmed that she has [facility] as a pharmacy insurance  She states she already has spoken with Jonathan Garner and they are awaiting a script to be sent to them so they can process her Botox prescription  Patient is aware of the Botox charges and how they work- she verbally understood that she will only be billed for the administration code  Patient verbally understood that Brandyn told me this would be a covered service  Patient verbally understood and will call me with any questions or concerns  Patient is aware that I will be calling Jonathan Garner today to initiate her Botox order

## 2019-07-24 NOTE — TELEPHONE ENCOUNTER
----- Message from Dalia Goltz, RN sent at 2019  1:49 PM EDT -----  Regarding: FW: Non-Urgent Medical Question  Contact: [phone]      ----- Message -----  From: Moustapha Echevarria  Sent: 2019   6:15 PM EDT  To: [facility] [place] [facility]  Subject: Non-Urgent Medical Question                      Dr Leonard Jensen,  When I saw you on  July 10, I asked how much it would cost if I paid for the botox treatment since my ins  won't cover it  You said you would have someone call me with the figure  I'm still waiting for that call  also,  Jeanette called on  and left a message to call about scheduling a botox treatment  I have called her back 6 times since then and she won't return my call  What's going on?   Álvaro Collins

## 2019-07-25 NOTE — TELEPHONE ENCOUNTER
Type Date User Summary Attachment   General 07/25/2019  8:59 AM [facility] Care Coordination  -   Note    Botox- no authorization needed   Please use Tammy      Thank you,        Sena Tang

## 2019-07-25 NOTE — TELEPHONE ENCOUNTER
[address]- spoke with Shanti- I was able to provide her with the patient's information- which allowed her to pull up the patient's account  I provided her with the patient's diagnosis code, allergies, and provider name  I was then transferred to a pharmacist to give a verbal script for Botox  Spoke with Denis- called in a verbal rx for Botox 100 units QTY: 2 under [facility] with 3 refills for cervical dystonia  She is aware we will need this Botox by next week  Will do a status check in 2 days

## 2019-07-26 NOTE — TELEPHONE ENCOUNTER
Called [facility]- spoke with Sadia Suazo- she states the patient's Botox order is ready to be set up for delivery  She states they do need to obtain the patient's consent  She is currently trying to get a hold of the patient while she has me on the phone  Patient did give consent and took care of her co-pay  Botox to be delivered on Tuesday 7/30/2019 to the [place] location- a signature will be required  Jeanette,    Please await Botox delivery and document once it has arrived  Please let me know if you do not receive the patient's order      Thank you,    Maggy Lorenzo

## 2019-07-26 NOTE — TELEPHONE ENCOUNTER
Approval received via cover my meds- approval letter has been entered into the patient's chart under "media" for future reference  Approval information:    Authorization #: [id number]  Valid dates: approved through 10/25/2019 under your Medicare Part D benefit

## 2019-07-31 ENCOUNTER — TELEPHONE (OUTPATIENT)
Dept: FAMILY MEDICINE CLINIC | Facility: CLINIC | Age: 74
End: 2019-07-31

## 2019-07-31 ENCOUNTER — HOSPITAL ENCOUNTER (OUTPATIENT)
Dept: RADIOLOGY | Facility: HOSPITAL | Age: 74
Discharge: HOME | End: 2019-07-31
Attending: OTOLARYNGOLOGY
Payer: COMMERCIAL

## 2019-07-31 ENCOUNTER — TRANSCRIBE ORDERS (OUTPATIENT)
Dept: ADMINISTRATIVE | Facility: HOSPITAL | Age: 74
End: 2019-07-31

## 2019-07-31 DIAGNOSIS — R05.9 COUGH: Primary | ICD-10-CM

## 2019-07-31 PROCEDURE — 71046 X-RAY EXAM CHEST 2 VIEWS: CPT

## 2019-07-31 NOTE — TELEPHONE ENCOUNTER
Dr Kaitlyn Benavides office called and said patient had a chest xray that showed nodules  They would like Dr Kaitlyn East to review the xray and advise the patient   stated "they dont do anything with pulmonology"  Please advise

## 2019-07-31 NOTE — TELEPHONE ENCOUNTER
Spoke with pt to let her know about the nodule and advised I would order the ct scan for her    States she is not very concerned about it has an upcoming appt with DR CHAO and would like to discuss it with her but does not think she is going to get the CT scan

## 2019-07-31 NOTE — TELEPHONE ENCOUNTER
I called patient and LMOM for patient to call back 7/31 @ 4:30 pm   She had a questionable nodule on her CXR and needs CT chest

## 2019-08-08 LAB
ALBUMIN SERPL-MCNC: 4.5 G/DL (ref 3.6–5.1)
ALBUMIN/GLOB SERPL: 2 (CALC) (ref 1–2.5)
ALP SERPL-CCNC: 91 U/L (ref 33–130)
ALT SERPL-CCNC: 16 U/L (ref 6–29)
AST SERPL-CCNC: 18 U/L (ref 10–35)
BASOPHILS # BLD AUTO: 61 CELLS/UL (ref 0–200)
BASOPHILS NFR BLD AUTO: 1 %
BILIRUB SERPL-MCNC: 0.5 MG/DL (ref 0.2–1.2)
BUN SERPL-MCNC: 10 MG/DL (ref 7–25)
BUN/CREAT SERPL: 10 (CALC) (ref 6–22)
CALCIUM SERPL-MCNC: 9.3 MG/DL (ref 8.6–10.4)
CHLORIDE SERPL-SCNC: 102 MMOL/L (ref 98–110)
CHOLEST SERPL-MCNC: 161 MG/DL
CHOLEST/HDLC SERPL: 2.2 (CALC)
CO2 SERPL-SCNC: 26 MMOL/L (ref 20–32)
CREAT SERPL-MCNC: 1.05 MG/DL (ref 0.6–0.93)
EOSINOPHIL # BLD AUTO: 171 CELLS/UL (ref 15–500)
EOSINOPHIL NFR BLD AUTO: 2.8 %
ERYTHROCYTE [DISTWIDTH] IN BLOOD BY AUTOMATED COUNT: 12.2 % (ref 11–15)
EST. AVERAGE GLUCOSE BLD GHB EST-MCNC: 120 (CALC)
EST. AVERAGE GLUCOSE BLD GHB EST-SCNC: 6.6 (CALC)
GLOBULIN SER CALC-MCNC: 2.2 G/DL (CALC) (ref 1.9–3.7)
GLUCOSE SERPL-MCNC: 113 MG/DL (ref 65–99)
HBA1C MFR BLD: 5.8 % OF TOTAL HGB
HCT VFR BLD AUTO: 39.1 % (ref 35–45)
HDLC SERPL-MCNC: 73 MG/DL
HGB BLD-MCNC: 13 G/DL (ref 11.7–15.5)
LDLC SERPL CALC-MCNC: 72 MG/DL (CALC)
LYMPHOCYTES # BLD AUTO: 1653 CELLS/UL (ref 850–3900)
LYMPHOCYTES NFR BLD AUTO: 27.1 %
MCH RBC QN AUTO: 30.8 PG (ref 27–33)
MCHC RBC AUTO-ENTMCNC: 33.2 G/DL (ref 32–36)
MCV RBC AUTO: 92.7 FL (ref 80–100)
MONOCYTES # BLD AUTO: 647 CELLS/UL (ref 200–950)
MONOCYTES NFR BLD AUTO: 10.6 %
NEUTROPHILS # BLD AUTO: 3569 CELLS/UL (ref 1500–7800)
NEUTROPHILS NFR BLD AUTO: 58.5 %
NONHDLC SERPL-MCNC: 88 MG/DL (CALC)
PLATELET # BLD AUTO: 287 THOUSAND/UL (ref 140–400)
PMV BLD REES-ECKER: 9.1 FL (ref 7.5–12.5)
POTASSIUM SERPL-SCNC: 4.3 MMOL/L (ref 3.5–5.3)
PROT SERPL-MCNC: 6.7 G/DL (ref 6.1–8.1)
RBC # BLD AUTO: 4.22 MILLION/UL (ref 3.8–5.1)
SL AMB EGFR AFRICAN AMERICAN: 60 ML/MIN/1.73M2
SL AMB EGFR NON AFRICAN AMERICAN: 52 ML/MIN/1.73M2
SODIUM SERPL-SCNC: 135 MMOL/L (ref 135–146)
TRIGL SERPL-MCNC: 81 MG/DL
TSH SERPL-ACNC: 1.5 MIU/L (ref 0.4–4.5)
WBC # BLD AUTO: 6.1 THOUSAND/UL (ref 3.8–10.8)

## 2019-08-14 ENCOUNTER — OFFICE VISIT (OUTPATIENT)
Dept: FAMILY MEDICINE CLINIC | Facility: CLINIC | Age: 74
End: 2019-08-14
Payer: COMMERCIAL

## 2019-08-14 VITALS
BODY MASS INDEX: 18.27 KG/M2 | HEIGHT: 64 IN | WEIGHT: 107 LBS | DIASTOLIC BLOOD PRESSURE: 74 MMHG | HEART RATE: 84 BPM | TEMPERATURE: 97.3 F | RESPIRATION RATE: 16 BRPM | SYSTOLIC BLOOD PRESSURE: 136 MMHG

## 2019-08-14 DIAGNOSIS — E03.9 HYPOTHYROIDISM, UNSPECIFIED TYPE: ICD-10-CM

## 2019-08-14 DIAGNOSIS — E78.2 MIXED HYPERLIPIDEMIA: ICD-10-CM

## 2019-08-14 DIAGNOSIS — G25.0 ESSENTIAL TREMOR: ICD-10-CM

## 2019-08-14 DIAGNOSIS — R79.89 ELEVATED SERUM CREATININE: ICD-10-CM

## 2019-08-14 DIAGNOSIS — R91.1 LUNG NODULE, SOLITARY: ICD-10-CM

## 2019-08-14 DIAGNOSIS — R73.09 ELEVATED GLUCOSE: ICD-10-CM

## 2019-08-14 DIAGNOSIS — Z00.00 MEDICARE ANNUAL WELLNESS VISIT, SUBSEQUENT: Primary | ICD-10-CM

## 2019-08-14 DIAGNOSIS — M81.0 OSTEOPOROSIS, UNSPECIFIED OSTEOPOROSIS TYPE, UNSPECIFIED PATHOLOGICAL FRACTURE PRESENCE: ICD-10-CM

## 2019-08-14 PROCEDURE — G0439 PPPS, SUBSEQ VISIT: HCPCS | Performed by: INTERNAL MEDICINE

## 2019-08-14 PROCEDURE — 1170F FXNL STATUS ASSESSED: CPT | Performed by: INTERNAL MEDICINE

## 2019-08-14 PROCEDURE — 99214 OFFICE O/P EST MOD 30 MIN: CPT | Performed by: INTERNAL MEDICINE

## 2019-08-14 PROCEDURE — 1125F AMNT PAIN NOTED PAIN PRSNT: CPT | Performed by: INTERNAL MEDICINE

## 2019-08-14 RX ORDER — LEVOTHYROXINE SODIUM 0.05 MG/1
50 TABLET ORAL DAILY
Qty: 90 TABLET | Refills: 3 | Status: SHIPPED | OUTPATIENT
Start: 2019-08-14 | End: 2020-08-27 | Stop reason: SDUPTHER

## 2019-08-14 RX ORDER — ATORVASTATIN CALCIUM 10 MG/1
10 TABLET, FILM COATED ORAL DAILY
Qty: 90 TABLET | Refills: 3 | Status: SHIPPED | OUTPATIENT
Start: 2019-08-14 | End: 2020-08-27 | Stop reason: SDUPTHER

## 2019-08-14 NOTE — PROGRESS NOTES
Subjective:      Patient ID: Raya Walker is a 66 y o  female  Chief Complaint   Patient presents with   [facility] Wellness Visit     new labs--lj       Here for follow up visit, to go over bloodwork  Was recently at her ENT office and he did a CXR due to a dry cough of 3 months' duration  This showed a questionable L upper lobe nodule  She has not smoked for past 30 years  No prior imaging for comparison  No weight loss or night sweats  Seeing a neurologist for her essential tremor and is now on clonazepam and is going for botox  She feels she is better but is having some sleepiness with the clonazepam and will discuss this with her neurologist        The following portions of the patient's history were reviewed and updated as appropriate: allergies, current medications, past family history, past medical history, past social history, past surgical history and problem list     Review of Systems   Constitutional: Negative  Respiratory: Negative  Cardiovascular: Negative  Current Outpatient Medications   Medication Sig Dispense Refill    atorvastatin (LIPITOR) 10 mg tablet Take 1 tablet (10 mg total) by mouth daily 90 tablet 3    brinzolamide (AZOPT) 1 % ophthalmic suspension Apply to eye      clonazePAM (KlonoPIN) 0 5 mg tablet Take 1 tablet (0 5 mg total) by mouth 2 (two) times a day 180 tablet 3    levothyroxine 50 mcg tablet Take 1 tablet (50 mcg total) by mouth daily 90 tablet 3    travoprost (TRAVATAN Z) 0 004 % ophthalmic solution Apply to eye      zonisamide (ZONEGRAN) 50 MG capsule Take 1 capsule (50 mg total) by mouth daily at bedtime Start with 1 cap QHS and increase to 2 in 2 weeks 90 capsule 3    meclizine (ANTIVERT) 25 mg tablet Take 1 tablet (25 mg total) by mouth every 8 (eight) hours as needed for dizziness (Patient not taking: Reported on 2/7/2019 ) 30 tablet 0     No current facility-administered medications for this visit          Objective:    /74 Pulse 84   Temp (!) 97 3 °F (36 3 °C)   Resp 16   Ht 5' 3 5" (1 613 m)   Wt 48 5 kg (107 lb)   BMI 18 66 kg/m²        Physical Exam   Constitutional: She appears well-developed and well-nourished  Eyes: Conjunctivae are normal    Neck: Neck supple  No JVD present  No thyromegaly present  Cardiovascular: Normal rate, regular rhythm, normal heart sounds and intact distal pulses  Exam reveals no gallop and no friction rub  No murmur heard  Pulmonary/Chest: Effort normal and breath sounds normal  She has no wheezes  She has no rales  Abdominal: Soft  Bowel sounds are normal  She exhibits no distension  There is no tenderness  Musculoskeletal: She exhibits no edema  Assessment/Plan:    Osteoporosis  She refuses medication for this so refuses the DEXA scan  Essential tremor  Care per neurologist and has upcoming botox treatment scheduled  Hypothyroidism  Reviewed labs with patient, TSH is within goal and she is clinically euthyroid  Mixed hyperlipidemia  Reviewed labs with patient, lipids are within goal on atorvastatin and will continue  Continue low fat diet and exercise  Elevated glucose  Reviewed labs and this is stable; she is watching her diet and we will continue to follow  Elevated serum creatinine  Stable at 1 05  Diagnoses and all orders for this visit:    Medicare annual wellness visit, subsequent    Mixed hyperlipidemia  -     CBC and differential; Future  -     Comprehensive metabolic panel; Future  -     Lipid panel; Future  -     CBC and differential  -     Comprehensive metabolic panel  -     Lipid panel    Essential tremor    Hypothyroidism, unspecified type    Lung nodule, solitary  Comments: Will check CT lung  Orders:  -     CT chest w contrast; Future    Osteoporosis, unspecified osteoporosis type, unspecified pathological fracture presence    Elevated glucose  -     HEMOGLOBIN A1C W/ EAG ESTIMATION;  Future    Elevated serum creatinine Return in about 6 months (around 2/14/2020)         Yvette Davila MD

## 2019-08-14 NOTE — ASSESSMENT & PLAN NOTE
Reviewed labs with patient, lipids are within goal on atorvastatin and will continue  Continue low fat diet and exercise

## 2019-08-14 NOTE — PATIENT INSTRUCTIONS
Obesity   AMBULATORY CARE:   Obesity  is when your body mass index (BMI) is greater than 30  Your healthcare provider will use your height and weight to measure your BMI  The risks of obesity include  many health problems, such as injuries or physical disability  You may need tests to check for the following:  · Diabetes     · High blood pressure or high cholesterol     · Heart disease     · Gallbladder or liver disease     · Cancer of the colon, breast, prostate, liver, or kidney     · Sleep apnea     · Arthritis or gout  Seek care immediately if:   · You have a severe headache, confusion, or difficulty speaking  · You have weakness on one side of your body  · You have chest pain, sweating, or shortness of breath  Contact your healthcare provider if:   · You have symptoms of gallbladder or liver disease, such as pain in your upper abdomen  · You have knee or hip pain and discomfort while walking  · You have symptoms of diabetes, such as intense hunger and thirst, and frequent urination  · You have symptoms of sleep apnea, such as snoring or daytime sleepiness  · You have questions or concerns about your condition or care  Treatment for obesity  focuses on helping you lose weight to improve your health  Even a small decrease in BMI can reduce the risk for many health problems  Your healthcare provider will help you set a weight-loss goal   · Lifestyle changes  are the first step in treating obesity  These include making healthy food choices and getting regular physical activity  Your healthcare provider may suggest a weight-loss program that involves coaching, education, and therapy  · Medicine  may help you lose weight when it is used with a healthy diet and physical activity  · Surgery  can help you lose weight if you are very obese and have other health problems  There are several types of weight-loss surgery  Ask your healthcare provider for more information    Be successful losing weight:   · Set small, realistic goals  An example of a small goal is to walk for 20 minutes 5 days a week  Anther goal is to lose 5% of your body weight  · Tell friends, family members, and coworkers about your goals  and ask for their support  Ask a friend to lose weight with you, or join a weight-loss support group  · Identify foods or triggers that may cause you to overeat , and find ways to avoid them  Remove tempting high-calorie foods from your home and workplace  Place a bowl of fresh fruit on your kitchen counter  If stress causes you to eat, then find other ways to cope with stress  · Keep a diary to track what you eat and drink  Also write down how many minutes of physical activity you do each day  Weigh yourself once a week and record it in your diary  Eating changes: You will need to eat 500 to 1,000 fewer calories each day than you currently eat to lose 1 to 2 pounds a week  The following changes will help you cut calories:  · Eat smaller portions  Use small plates, no larger than 9 inches in diameter  Fill your plate half full of fruits and vegetables  Measure your food using measuring cups until you know what a serving size looks like  · Eat 3 meals and 1 or 2 snacks each day  Plan your meals in advance  [employer] and eat at home most of the time  Eat slowly  · Eat fruits and vegetables at every meal   They are low in calories and high in fiber, which makes you feel full  Do not add butter, margarine, or cream sauce to vegetables  Use herbs to season steamed vegetables  · Eat less fat and fewer fried foods  Eat more baked or grilled chicken and fish  These protein sources are lower in calories and fat than red meat  Limit fast food  Dress your salads with olive oil and vinegar instead of bottled dressing  · Limit the amount of sugar you eat  Do not drink sugary beverages  Limit alcohol  Activity changes:  Physical activity is good for your body in many ways   It helps you burn calories and build strong muscles  It decreases stress and depression, and improves your mood  It can also help you sleep better  Talk to your healthcare provider before you begin an exercise program   · Exercise for at least 30 minutes 5 days a week  Start slowly  Set aside time each day for physical activity that you enjoy and that is convenient for you  It is best to do both weight training and an activity that increases your heart rate, such as walking, bicycling, or swimming  · Find ways to be more active  Do yard work and housecleaning  Walk up the stairs instead of using elevators  Spend your leisure time going to events that require walking, such as outdoor festivals or fairs  This extra physical activity can help you lose weight and keep it off  Follow up with your healthcare provider as directed: You may need to meet with a dietitian  Write down your questions so you remember to ask them during your visits  © 2017 [employer] [employer] Information is for End User's use only and may not be sold, redistributed or otherwise used for commercial purposes  All illustrations and images included in CareNotes® are the copyrighted property of [employer]  or Isrrael Lopez  The above information is an  only  It is not intended as medical advice for individual conditions or treatments  Talk to your doctor, nurse or pharmacist before following any medical regimen to see if it is safe and effective for you  Urinary Incontinence   WHAT YOU NEED TO KNOW:   What is urinary incontinence? Urinary incontinence (UI) is when you lose control of your bladder  What causes UI? UI occurs because your bladder cannot store or empty urine properly  The following are the most common types of UI:  · Stress incontinence  is when you leak urine due to increased bladder pressure  This may happen when you cough, sneeze, or exercise       · Urge incontinence  is when you feel the need to urinate right away and leak urine accidentally  · Mixed incontinence  is when you have both stress and urge UI  What are the signs and symptoms of UI?   · You feel like your bladder does not empty completely when you urinate  · You urinate often and need to urinate immediately  · You leak urine when you sleep, or you wake up with the urge to urinate  · You leak urine when you cough, sneeze, exercise, or laugh  How is UI diagnosed? Your healthcare provider will ask how often you leak urine and whether you have stress or urge symptoms  Tell him which medicines you take, how often you urinate, and how much liquid you drink each day  You may need any of the following tests:  · Urine tests  may show infection or kidney function  · A pelvic exam  may be done to check for blockages  A pelvic exam will also show if your bladder, uterus, or other organs have moved out of place  · An x-ray, ultrasound, or CT  may show problems with parts of your urinary system  You may be given contrast liquid to help your organs show up better in the pictures  Tell the healthcare provider if you have ever had an allergic reaction to contrast liquid  Do not enter the MRI room with anything metal  Metal can cause serious injury  Tell the healthcare provider if you have any metal in or on your body  · A bladder scan  will show how much urine is left in your bladder after you urinate  You will be asked to urinate and then healthcare providers will use a small ultrasound machine to check the urine left in your bladder  · Cystometry  is used to check the function of your urinary system  Your healthcare provider checks the pressure in your bladder while filling it with fluid  Your bladder pressure may also be tested when your bladder is full and while you urinate  How is UI treated? · Medicines  can help strengthen your bladder control      · Electrical stimulation  is used to send a small amount of electrical energy to your pelvic floor muscles  This helps control your bladder function  Electrodes may be placed outside your body or in your rectum  For women, the electrodes may be placed in the vagina  · A bulking agent  may be injected into the wall of your urethra to make it thicker  This helps keep your urethra closed and decreases urine leakage  · Devices  such as a clamp, pessary, or tampon may help stop urine leaks  Ask your healthcare provider for more information about these and other devices  · Surgery  may be needed if other treatments do not work  Several types of surgery can help improve your bladder control  Ask your healthcare provider for more information about the surgery you may need  How can I manage my symptoms? · Do pelvic muscle exercises often  Your pelvic muscles help you stop urinating  Squeeze these muscles tight for 5 seconds, then relax for 5 seconds  Gradually work up to squeezing for 10 seconds  Do 3 sets of 15 repetitions a day, or as directed  This will help strengthen your pelvic muscles and improve bladder control  · A catheter  may be used to help empty your bladder  A catheter is a tiny, plastic tube that is put into your bladder to drain your urine  Your healthcare provider may tell you to use a catheter to prevent your bladder from getting too full and leaking urine  · Keep a UI record  Write down how often you leak urine and how much you leak  Make a note of what you were doing when you leaked urine  · Train your bladder  Go to the bathroom at set times, such as every 2 hours, even if you do not feel the urge to go  You can also try to hold your urine when you feel the urge to go  For example, hold your urine for 5 minutes when you feel the urge to go  As that becomes easier, hold your urine for 10 minutes  · Drink liquids as directed  Ask your healthcare provider how much liquid to drink each day and which liquids are best for you   You may need to limit the amount of liquid you drink to help control your urine leakage  Limit or do not have drinks that contain caffeine or alcohol  Do not drink any liquid right before you go to bed  · Prevent constipation  Eat a variety of high-fiber foods  Good examples are high-fiber cereals, beans, vegetables, and whole-grain breads  Prune juice may help make your bowel movement softer  Walking is the best way to trigger your intestines to have a bowel movement  · Exercise regularly and maintain a healthy weight  Ask your healthcare provider how much you should weigh and about the best exercise plan for you  Weight loss and exercise will decrease pressure on your bladder and help you control your leakage  Ask him to help you create a weight loss plan if you are overweight  When should I seek immediate care? · You have severe pain  · You are confused or cannot think clearly  When should I contact my healthcare provider? · You have a fever  · You see blood in your urine  · You have pain when you urinate  · You have new or worse pain, even after treatment  · Your mouth feels dry or you have vision changes  · Your urine is cloudy or smells bad  · You have questions or concerns about your condition or care  CARE AGREEMENT:   You have the right to help plan your care  Learn about your health condition and how it may be treated  Discuss treatment options with your caregivers to decide what care you want to receive  You always have the right to refuse treatment  The above information is an  only  It is not intended as medical advice for individual conditions or treatments  Talk to your doctor, nurse or pharmacist before following any medical regimen to see if it is safe and effective for you  © 2017 [employer] Information is for End User's use only and may not be sold, redistributed or otherwise used for commercial purposes   All illustrations and images included in CareNotes® are the copyrighted property of [employer]  or Isrrael Lopez  Cigarette Smoking and Your Health   AMBULATORY CARE:   Risks to your health if you smoke:  Nicotine and other chemicals found in tobacco damage every cell in your body  Even if you are a light smoker, you have an increased risk for cancer, heart disease, and lung disease  If you are pregnant or have diabetes, smoking increases your risk for complications  Benefits to your health if you stop smoking:   · You decrease respiratory symptoms such as coughing, wheezing, and shortness of breath  · You reduce your risk for cancers of the lung, mouth, throat, kidney, bladder, pancreas, stomach, and cervix  If you already have cancer, you increase the benefits of chemotherapy  You also reduce your risk for cancer returning or a second cancer from developing  · You reduce your risk for heart disease, blood clots, heart attack, and stroke  · You reduce your risk for lung infections, and diseases such as pneumonia, asthma, chronic bronchitis, and emphysema  · Your circulation improves  More oxygen can be delivered to your body  If you have diabetes, you lower your risk for complications, such as kidney, artery, and eye diseases  You also lower your risk for nerve damage  Nerve damage can lead to amputations, poor vision, and blindness  · You improve your body's ability to heal and to fight infections  Benefits to the health of others if you stop smoking:  Tobacco is harmful to nonsmokers who breathe in your secondhand smoke  The following are ways the health of others around you may improve when you stop smoking:  · You lower the risks for lung cancer and heart disease in nonsmoking adults  · If you are pregnant, you lower the risk for miscarriage, early delivery, low birth weight, and stillbirth  You also lower your baby's risk for SIDS, obesity, developmental delay, and neurobehavioral problems, such as ADHD  · If you have children, you lower their risk for ear infections, colds, pneumonia, bronchitis, and asthma  For more information and support to stop smoking:   · Smokefree  gov  Phone: 8- 209 - 789-5167  Web Address: www smokefree  gov  Follow up with your healthcare provider as directed:  Write down your questions so you remember to ask them during your visits  © 2017 [employer] [employer] Information is for End User's use only and may not be sold, redistributed or otherwise used for commercial purposes  All illustrations and images included in CareNotes® are the copyrighted property of [employer]  or Isrrael Lopez  The above information is an  only  It is not intended as medical advice for individual conditions or treatments  Talk to your doctor, nurse or pharmacist before following any medical regimen to see if it is safe and effective for you  Fall Prevention   AMBULATORY CARE:   Fall prevention  includes ways to make your home and other areas safer  It also includes ways you can move more carefully to prevent a fall  Health conditions that cause changes in your blood pressure, vision, or muscle strength and coordination may increase your risk for falls  Medicines may also increase your risk for falls if they make you dizzy, weak, or sleepy  Call 911 or have someone else call if:   · You have fallen and are unconscious  · You have fallen and cannot move part of your body  Contact your healthcare provider if:   · You have fallen and have pain or a headache  · You have questions or concerns about your condition or care  Fall prevention tips:   · Stand or sit up slowly  This may help you keep your balance and prevent falls  · Use assistive devices as directed  Your healthcare provider may suggest that you use a cane or walker to help you keep your balance  You may need to have grab bars put in your bathroom near the toilet or in the shower      · Wear shoes that fit well and have soles that   Wear shoes both inside and outside  Use slippers with good   Do not wear shoes with high heels  · Wear a personal alarm  This is a device that allows you to call 911 if you fall and need help  Ask your healthcare provider for more information  · Stay active  Exercise can help strengthen your muscles and improve your balance  Your healthcare provider may recommend water aerobics or walking  He or she may also recommend physical therapy to improve your coordination  Never start an exercise program without talking to your healthcare provider first      · Manage your medical conditions  Keep all appointments with your healthcare providers  Visit your eye doctor as directed  Home safety tips:   · Add items to prevent falls in the bathroom  Put nonslip strips on your bath or shower floor to prevent you from slipping  Use a bath mat if you do not have carpet in the bathroom  This will prevent you from falling when you step out of the bath or shower  Use a shower seat so you do not need to stand while you shower  Sit on the toilet or a chair in your bathroom to dry yourself and put on clothing  This will prevent you from losing your balance from drying or dressing yourself while you are standing  · Keep paths clear  Remove books, shoes, and other objects from walkways and stairs  Place cords for telephones and lamps out of the way so that you do not need to walk over them  Tape them down if you cannot move them  Remove small rugs  If you cannot remove a rug, secure it with double-sided tape  This will prevent you from tripping  · Install bright lights in your home  Use night lights to help light paths to the bathroom or kitchen  Always turn on the light before you start walking  · Keep items you use often on shelves within reach  Do not use a step stool to help you reach an item  · Paint or place reflective tape on the edges of your stairs    This will help you see the stairs better  Follow up with your healthcare provider as directed:  Write down your questions so you remember to ask them during your visits  © 2017 [address] [employer] Information is for End User's use only and may not be sold, redistributed or otherwise used for commercial purposes  All illustrations and images included in CareNotes® are the copyrighted property of [employer]  or Isrrael Lopez  The above information is an  only  It is not intended as medical advice for individual conditions or treatments  Talk to your doctor, nurse or pharmacist before following any medical regimen to see if it is safe and effective for you  Advance Directives   WHAT YOU NEED TO KNOW:   What are advance directives? Advance directives are legal documents that state your wishes and plans for medical care  These plans are made ahead of time in case you lose your ability to make decisions for yourself  Advance directives can apply to any medical decision, such as the treatments you want, and if you want to donate organs  What are the types of advance directives? There are many types of advance directives, and each state has rules about how to use them  You may choose a combination of any of the following:  · Living will: This is a written record of the treatment you want  You can also choose which treatments you do not want, which to limit, and which to stop at a certain time  This includes surgery, medicine, IV fluid, and tube feedings  · Durable power of  for healthcare [place] [facility]): This is a written record that states who you want to make healthcare choices for you when you are unable to make them for yourself  This person, called a proxy, is usually a family member or a friend  You may choose more than 1 proxy  · Do not resuscitate (DNR) order:  A DNR order is used in case your heart stops beating or you stop breathing   It is a request not to have certain forms of treatment, such as CPR  A DNR order may be included in other types of advance directives  · Medical directive: This covers the care that you want if you are in a coma, near death, or unable to make decisions for yourself  You can list the treatments you want for each condition  Treatment may include pain medicine, surgery, blood transfusions, dialysis, IV or tube feedings, and a ventilator (breathing machine)  · Values history: This document has questions about your views, beliefs, and how you feel and think about life  This information can help others choose the care that you would choose  Why are advance directives important? An advance directive helps you control your care  Although spoken wishes may be used, it is better to have your wishes written down  Spoken wishes can be misunderstood, or not followed  Treatments may be given even if you do not want them  An advance directive may make it easier for your family to make difficult choices about your care  How do I decide what to put in my advance directives? · Make informed decisions:  Make sure you fully understand treatments or care you may receive  Think about the benefits and problems your decisions could cause for you or your family  Talk to healthcare providers if you have concerns or questions before you write down your wishes  You may also want to talk with your Sikh or , or a   Check your state laws to make sure that what you put in your advance directive is legal      · Sign all forms:  Sign and date your advance directive when you have finished  You may also need 2 witnesses to sign the forms  Witnesses cannot be your doctor or his staff, your spouse, heirs or beneficiaries, people you owe money to, or your chosen proxy  Talk to your family, proxy, and healthcare providers about your advance directive  Give each person a copy, and keep one for yourself in a place you can get to easily   Do not keep it hidden or locked away  · Review and revise your plans: You can revise your advance directive at any time, as long as you are able to make decisions  Review your plan every year, and when there are changes in your life, or your health  When you make changes, let your family, proxy, and healthcare providers know  Give each a new copy  Where can I find more information? · [employer]  [address] , [address]  Phone: 6- [phone] - [phone]  Phone: 9- [phone]  Web Address: http://www  aafp org  · [address] [facility])  [address] [address] [facility], [address] [facility] , [address] [place] [address]  Phone: [phone]  Phone: [phone]  Web Address: Jolanta donnelly  CARE AGREEMENT:   You have the right to help plan your care  To help with this plan, you must learn about your health condition and treatment options  You must also learn about advance directives and how they are used  Work with your healthcare providers to decide what care will be used to treat you  You always have the right to refuse treatment  The above information is an  only  It is not intended as medical advice for individual conditions or treatments  Talk to your doctor, nurse or pharmacist before following any medical regimen to see if it is safe and effective for you  © 2017 [address] [employer] [employer] Information is for End User's use only and may not be sold, redistributed or otherwise used for commercial purposes  All illustrations and images included in CareNotes® are the copyrighted property of [employer]  or Isrrael Lopez

## 2019-08-14 NOTE — PROGRESS NOTES
Assessment and Plan:     Problem List Items Addressed This Visit        Endocrine    Hypothyroidism       Nervous and Auditory    Essential tremor       Musculoskeletal and Integument    Osteoporosis     She refuses medication for this so refuses the DEXA scan              Other    Mixed hyperlipidemia    Relevant Orders    CBC and differential    Comprehensive metabolic panel    Lipid panel    Elevated glucose    Relevant Orders    HEMOGLOBIN A1C W/ EAG ESTIMATION      Other Visit Diagnoses     Medicare annual wellness visit, subsequent    -  Primary    Lung nodule, solitary        Relevant Orders    CT chest w contrast         History of Present Illness:     Patient presents for Medicare Annual Wellness visit    Patient Care Team:  Tashi Currie MD as PCP - General  Olga Burgess MD     Problem List:     Patient Active Problem List   Diagnosis    Essential tremor    Colon polyp    Hypothyroidism    Osteoporosis    Mixed hyperlipidemia    Cervical dystonia    Elevated glucose    Elevated serum creatinine      Past Medical and Surgical History:     Past Medical History:   Diagnosis Date    Disease of thyroid gland     Dizziness     Glaucoma     Tremor      Past Surgical History:   Procedure Laterality Date    CATARACT EXTRACTION, BILATERAL        Family History:     Family History   Problem Relation Age of Onset    No Known Problems Mother     Diabetes Brother         DM    Diabetes Sister     Dystonia Neg Hx     Tremor Neg Hx       Social History:     Social History     Tobacco Use   Smoking Status Former Smoker   Smokeless Tobacco Never Used     Social History     Substance and Sexual Activity   Alcohol Use Yes    Comment: daily beer     Social History     Substance and Sexual Activity   Drug Use No      Medications and Allergies:     Current Outpatient Medications   Medication Sig Dispense Refill    atorvastatin (LIPITOR) 10 mg tablet Take 1 tablet (10 mg total) by mouth daily 90 tablet 3    brinzolamide (AZOPT) 1 % ophthalmic suspension Apply to eye      clonazePAM (KlonoPIN) 0 5 mg tablet Take 1 tablet (0 5 mg total) by mouth 2 (two) times a day 180 tablet 3    levothyroxine 50 mcg tablet Take 1 tablet (50 mcg total) by mouth daily 90 tablet 3    travoprost (TRAVATAN Z) 0 004 % ophthalmic solution Apply to eye      zonisamide (ZONEGRAN) 50 MG capsule Take 1 capsule (50 mg total) by mouth daily at bedtime Start with 1 cap QHS and increase to 2 in 2 weeks 90 capsule 3    meclizine (ANTIVERT) 25 mg tablet Take 1 tablet (25 mg total) by mouth every 8 (eight) hours as needed for dizziness (Patient not taking: Reported on 2/7/2019 ) 30 tablet 0     No current facility-administered medications for this visit  No Known Allergies   Immunizations:     Immunization History   Administered Date(s) Administered    Influenza Split High Dose Preservative Free IM 10/18/2012, 10/03/2013    Influenza TIV (IM) 10/20/2010, 10/26/2011, 09/08/2014, 09/12/2015    Pneumococcal Conjugate 13-Valent 12/16/2015    Pneumococcal Polysaccharide PPV23 07/09/2009    influenza, trivalent, adjuvanted 09/14/2018      Medicare Screening Tests and Risk Assessments:     Kassidy Rios is here for her Subsequent Wellness visit  Health Risk Assessment:  Patient rates overall health as good  Patient feels that their physical health rating is Same  Eyesight was rated as Same  Hearing was rated as Same  Patient feels that their emotional and mental health rating is Same  Pain experienced by patient in the last 7 days has been None  Patient states that she has experienced no weight loss or gain in last 6 months  Emotional/Mental Health:  Patient has not been feeling nervous/anxious  PHQ-9 Depression Screening:    Frequency of the following problems over the past two weeks:      1  Little interest or pleasure in doing things: 0 - not at all      2   Feeling down, depressed, or hopeless: 0 - not at all  PHQ-2 Score: 0          Broken Bones/Falls: Fall Risk Assessment:    In the past year, patient has experienced: No history of falling in past year          Bladder/Bowel:  Patient has not leaked urine accidently in the last six months  Patient reports no loss of bowel control  Immunizations:  Patient has had a flu vaccination within the last year  Patient has received a pneumonia shot  Patient has not received a shingles shot  Patient has not received tetanus/diphtheria shot  Home Safety:  Patient does not have trouble with stairs inside or outside of their home  Patient currently reports that there are no safety hazards present in home, working smoke alarms, no working carbon monoxide detectors  Preventative Screenings:   Breast cancer screening performed, colon cancer screen completed, cholesterol screen completed, glaucoma eye exam completed,     Nutrition:  Current diet: Low Carb and Limited junk food with servings of the following:    Medications:  Patient is not currently taking any over-the-counter supplements  Patient is able to manage medications  Lifestyle Choices:  Patient reports no tobacco use  Patient has smoked or used tobacco in the past   Patient has not stopped tobacco use  Patient reports alcohol use  Alcohol use per week: social  Patient drives a vehicle  Patient wears seat belt  Current level of exercise of physical activity described by patient as: medium-runs-dance-floor exercises--lj  Activities of Daily Living:  Can get out of bed by his or her self, able to dress self, able to make own meals, able to do own shopping, able to bathe self, can do own laundry/housekeeping, can manage own money, pay bills and track expenses    Previous Hospitalizations:  No hospitalization or ED visit in past 12 months        Advanced Directives:  Patient has decided on a power of   Patient has spoken to designated power of     Patient has completed advanced directive  Preventative Screening/Counseling:      Cardiovascular:      General: Risks and Benefits Discussed and Screening Current          Diabetes:      General: Risks and Benefits Discussed and Screening Current          Colorectal Cancer:      General: Risks and Benefits Discussed      Due for studies: Colonoscopy - High Risk          Breast Cancer:      General: Screening Current          Cervical Cancer:      General: Screening Not Indicated          Osteoporosis:      General: Patient Declines      Counseling: Calcium and Vitamin D Intake and Regular Weightbearing Exercise          AAA:      General: Screening Not Indicated          Glaucoma:      General: Screening Current          HIV:      General: Screening Not Indicated          Hepatitis C:      General: Patient Declines        Advanced Directives:   Patient has living will for healthcare, has durable POA for healthcare,     Immunizations:      Influenza: Influenza UTD This Year      Pneumococcal: Lifetime Vaccine Completed      Shingrix: Risks & Benefits Discussed and Patient Declines      Hepatitis B (Low risk patients): Series Not Indicated      Other Preventative Counseling (Non-Medicare):   Fall Prevention and Increase physical activity

## 2019-08-20 ENCOUNTER — PROCEDURE VISIT (OUTPATIENT)
Dept: NEUROLOGY | Facility: CLINIC | Age: 74
End: 2019-08-20
Payer: COMMERCIAL

## 2019-08-20 VITALS — SYSTOLIC BLOOD PRESSURE: 160 MMHG | DIASTOLIC BLOOD PRESSURE: 67 MMHG | TEMPERATURE: 98.4 F

## 2019-08-20 DIAGNOSIS — G24.3 CERVICAL DYSTONIA: Primary | ICD-10-CM

## 2019-08-20 PROCEDURE — 64616 CHEMODENERV MUSC NECK DYSTON: CPT | Performed by: PSYCHIATRY & NEUROLOGY

## 2019-08-20 NOTE — PROGRESS NOTES
Jostin Thomas returned today to the Botox clinic in the [address] [facility] at the [address] [facility]  As you know, the patient is a 66 y o  female who is receiving Botox injections for Cervical dystonia        Medications:      Current Outpatient Medications on File Prior to Visit   Medication Sig Dispense Refill    atorvastatin (LIPITOR) 10 mg tablet Take 1 tablet (10 mg total) by mouth daily 90 tablet 3    brinzolamide (AZOPT) 1 % ophthalmic suspension Apply to eye      clonazePAM (KlonoPIN) 0 5 mg tablet Take 1 tablet (0 5 mg total) by mouth 2 (two) times a day 180 tablet 3    levothyroxine 50 mcg tablet Take 1 tablet (50 mcg total) by mouth daily 90 tablet 3    meclizine (ANTIVERT) 25 mg tablet Take 1 tablet (25 mg total) by mouth every 8 (eight) hours as needed for dizziness (Patient not taking: Reported on 2/7/2019 ) 30 tablet 0    travoprost (TRAVATAN Z) 0 004 % ophthalmic solution Apply to eye      zonisamide (ZONEGRAN) 50 MG capsule Take 1 capsule (50 mg total) by mouth daily at bedtime Start with 1 cap QHS and increase to 2 in 2 weeks 90 capsule 3     No current facility-administered medications on file prior to visit  Focused Neurologic examination:     Turn to the right and tilt  Small left shift  Left cervical trap hypertrophy   Irregular jerky moderate amplitude head tremor  Injection details:     After sterile preparation of the skin, a total of 70 units of Botox were injected without EMG guidance  Each 100 units was diluted in 1 cc of PFNS  30 units were discarded as waste  The injections were done as follows into the:  RIGHT Splenious capitis: 30 units  RIGHT levator: 20 units   LEFT Splenious capitis: 20 units    The procedure was well tolerated without immediate complications  The patient will return in 12 weeks for further evaluation and treatment

## 2019-08-21 ENCOUNTER — HOSPITAL ENCOUNTER (OUTPATIENT)
Dept: RADIOLOGY | Facility: HOSPITAL | Age: 74
Discharge: HOME | End: 2019-08-21
Attending: INTERNAL MEDICINE
Payer: COMMERCIAL

## 2019-08-21 DIAGNOSIS — R91.1 LUNG NODULE, SOLITARY: ICD-10-CM

## 2019-08-21 PROCEDURE — 71260 CT THORAX DX C+: CPT

## 2019-08-21 RX ADMIN — IOHEXOL 85 ML: 350 INJECTION, SOLUTION INTRAVENOUS at 13:37

## 2019-08-24 ENCOUNTER — TELEPHONE (OUTPATIENT)
Dept: FAMILY MEDICINE CLINIC | Facility: CLINIC | Age: 74
End: 2019-08-24

## 2019-08-24 DIAGNOSIS — N28.89 KIDNEY MASS: Primary | ICD-10-CM

## 2019-08-24 NOTE — TELEPHONE ENCOUNTER
Looking for CT scan that she had done Wednesday     [facility]    [phone]

## 2019-08-26 NOTE — TELEPHONE ENCOUNTER
I spoke with patient and advised her of results  She will need repeat CT scan in 6 months to ensure stability of her pulmonary nodules  Also needs US kidneys to evaluate mass and this was ordered, she will  order  Please leave up front for her to

## 2019-09-06 ENCOUNTER — HOSPITAL ENCOUNTER (OUTPATIENT)
Dept: RADIOLOGY | Facility: HOSPITAL | Age: 74
Discharge: HOME | End: 2019-09-06
Attending: INTERNAL MEDICINE
Payer: COMMERCIAL

## 2019-09-06 DIAGNOSIS — N28.89 KIDNEY MASS: ICD-10-CM

## 2019-09-06 PROCEDURE — 76770 US EXAM ABDO BACK WALL COMP: CPT

## 2019-09-13 ENCOUNTER — TELEPHONE (OUTPATIENT)
Dept: NEUROLOGY | Facility: CLINIC | Age: 74
End: 2019-09-13

## 2019-09-13 NOTE — TELEPHONE ENCOUNTER
Pt called and states that she had botox done on 8/20/19 and she was instructed to call us in a month to report how she is doing  Pt states that she's been experiencing pain in right shoulder blade and down the arm started a week after the inj  Pt states that she did have h/o of right neck pain (2018)  She was seeing an orthopedic  X-ray of her neck was done 1/2018, "it showed I have 3 messed up disc in my neck and the doctor could not do anything for me so he sent me away with pain  I just started to just wear a neck brace  Eventually the pain resolved"  Pt wants to know if botox inj triggered her pain in her right shoulder blade  States that in the morning she's fine, pain usualy starts in the afternoon  Pain gets better laying down  Intensity of the pain is the same, not getting any better  Asking if she should cancel her upcoming botox appt? Any recommendation  [phone] can leave a detailed message

## 2019-09-15 NOTE — TELEPHONE ENCOUNTER
First I'm sorry she is having pain  Seems unusual to happen a week later  You can get pain from the injection if you get a deep bruise at one of the injection sites  You can get some pain as a complication of weekness from too much botox  Is it working at all? Is her head tremor any better?

## 2019-09-17 NOTE — TELEPHONE ENCOUNTER
Spoke to patient  She is very pleased with results of botox  Patient states tremor to head is greatly reduced - she has even had people comment on how they noticed the improvement  Patient still reports pain to right side of neck that occurs only towards evening but is relieved with lying down  Patient believes this is due to an old injury

## 2019-11-05 ENCOUNTER — TELEPHONE (OUTPATIENT)
Dept: NEUROLOGY | Facility: CLINIC | Age: 74
End: 2019-11-05

## 2019-11-05 NOTE — TELEPHONE ENCOUNTER
Referral Notes   Number of Notes: 1   Type Date User Summary Attachment   General 11/05/2019 10:08 AM Hitesh Archuleta care coordination  -   Note    Botox- no authorization required (carve out)   Please use Tammy      Thank you,     Misa Fabian

## 2019-11-06 NOTE — TELEPHONE ENCOUNTER
After reviewing documentation looks like in July 2019 Botox 100 unit X 2 were ordered from [facility] but Dr Maynor Paige only injected 100 units at visit (8/2019)  Confirmed with TRINY Colbert that there is Botox 100 unit X 1 (LOT # [id number]) in the [facility] office) currently for patient  Pending Dr Gian Kemp input if he would like 100 units or 200 units for this patient  I will hold off on ordering Botox at this time

## 2019-11-07 NOTE — TELEPHONE ENCOUNTER
Dr Julissa Diamond,    Please advise how much Botox you would like for this patient's upcoming injection appointment on 11/26/19? Originally 200 units were ordered but last visit you only injected 100 units       Thank you,    Raheem Mandujano

## 2019-11-07 NOTE — TELEPHONE ENCOUNTER
Call received from [address] [facility]  Botox will be delivered 11/11/19 for patient to [facility] office

## 2019-11-07 NOTE — TELEPHONE ENCOUNTER
Called patient and requested she call [facility] as they are needing her consent  I will check with [facility] tomorrow

## 2019-11-12 ENCOUNTER — DOCUMENTATION (OUTPATIENT)
Dept: NEUROLOGY | Facility: CLINIC | Age: 74
End: 2019-11-12

## 2019-11-12 NOTE — TELEPHONE ENCOUNTER
Raheem Mandujano,    Patient's botox arrived in office todau  Botox logged into book and stored in fridge       Thank you,  Ginger Mendenhall

## 2019-11-22 ENCOUNTER — TELEPHONE (OUTPATIENT)
Dept: NEUROLOGY | Facility: CLINIC | Age: 74
End: 2019-11-22

## 2019-11-26 ENCOUNTER — PROCEDURE VISIT (OUTPATIENT)
Dept: NEUROLOGY | Facility: CLINIC | Age: 74
End: 2019-11-26
Payer: COMMERCIAL

## 2019-11-26 VITALS — DIASTOLIC BLOOD PRESSURE: 84 MMHG | TEMPERATURE: 97.6 F | SYSTOLIC BLOOD PRESSURE: 130 MMHG

## 2019-11-26 DIAGNOSIS — G24.3 CERVICAL DYSTONIA: Primary | ICD-10-CM

## 2019-11-26 PROCEDURE — 64616 CHEMODENERV MUSC NECK DYSTON: CPT | Performed by: PSYCHIATRY & NEUROLOGY

## 2019-11-26 NOTE — PROGRESS NOTES
Mary Shultz returned today to the Botox clinic in the [address] at the [facility]  As you know, the patient is a 66 y o  female who is receiving Botox injections for cervical dystonia  The effect of the previous injection was very positive  The tremor was significantly improved  Many people commented about the improvement  Tremor started to come back just over the past 1-2 weeks  Side effects included right shoulder pain started a few days after the injection  Lasted a couple of weeks  Medications:      Current Outpatient Medications on File Prior to Visit   Medication Sig Dispense Refill    atorvastatin (LIPITOR) 10 mg tablet Take 1 tablet (10 mg total) by mouth daily 90 tablet 3    brinzolamide (AZOPT) 1 % ophthalmic suspension Apply to eye      clonazePAM (KlonoPIN) 0 5 mg tablet Take 1 tablet (0 5 mg total) by mouth 2 (two) times a day 180 tablet 3    levothyroxine 50 mcg tablet Take 1 tablet (50 mcg total) by mouth daily 90 tablet 3    meclizine (ANTIVERT) 25 mg tablet Take 1 tablet (25 mg total) by mouth every 8 (eight) hours as needed for dizziness (Patient not taking: Reported on 2/7/2019 ) 30 tablet 0    travoprost (TRAVATAN Z) 0 004 % ophthalmic solution Apply to eye      zonisamide (ZONEGRAN) 50 MG capsule Take 1 capsule (50 mg total) by mouth daily at bedtime Start with 1 cap QHS and increase to 2 in 2 weeks 90 capsule 3     No current facility-administered medications on file prior to visit  Focused Neurologic examination:     Turn to the right and tilt  Small left shift  Left cervical trap hypertrophy with elevation of the left shoulder  Irregular jerky moderate amplitude head tremor  Injection details:     After sterile preparation of the skin, a total of 110 units of Botox were injected without EMG guidance  Each 100 units was diluted in 1 cc of PFNS  90 units were discarded as waste        The injections were done as follows into the:  RIGHT Splenious capitis: 40 units  RIGHT levator: 20 units (higher location)   LEFT Splenious capitis: 30 units  LEFT cervical trap: 20     The procedure was well tolerated without immediate complications  The patient will return in 12 weeks for further evaluation and treatment  Next time we may consider: further dose increase  - 200units  Previous muscles injected:   - Levator injection at base of the neck caused pain

## 2019-12-03 ENCOUNTER — TELEPHONE (OUTPATIENT)
Dept: NEUROLOGY | Facility: CLINIC | Age: 74
End: 2019-12-03

## 2019-12-03 NOTE — TELEPHONE ENCOUNTER
Patient called and left a voicemail stating she just had Botox last week and per Dr Santino Figueroa she was to call in and give an update of how she is doing  She states that she is doing fine  She does not have any pain or weakness  She states everything is fine

## 2019-12-09 ENCOUNTER — TELEPHONE (OUTPATIENT)
Dept: NEUROLOGY | Facility: CLINIC | Age: 74
End: 2019-12-09

## 2019-12-09 NOTE — TELEPHONE ENCOUNTER
----- Message from Fidelia Crawford MA sent at 2019  3:37 PM EST -----  Regardin units simon of Botox  Opal,     I have a 100 unit vital left over from patient's last Botox appointment  Requesting only a 100 units for next visit, due to having 100 units left over, to make the 20 units total  Then back up to ordering 200 units for f/u visits  I talked to Justino Paige about it   Please follow up with or me if this email is confusing  :)    Thank you,  Jeanette

## 2020-01-15 ENCOUNTER — TELEPHONE (OUTPATIENT)
Dept: NEUROLOGY | Facility: CLINIC | Age: 75
End: 2020-01-15

## 2020-01-16 ENCOUNTER — OFFICE VISIT (OUTPATIENT)
Dept: NEUROLOGY | Facility: CLINIC | Age: 75
End: 2020-01-16
Payer: COMMERCIAL

## 2020-01-16 VITALS
BODY MASS INDEX: 18.78 KG/M2 | WEIGHT: 110 LBS | HEIGHT: 64 IN | SYSTOLIC BLOOD PRESSURE: 130 MMHG | HEART RATE: 86 BPM | DIASTOLIC BLOOD PRESSURE: 80 MMHG

## 2020-01-16 DIAGNOSIS — G24.3 CERVICAL DYSTONIA: ICD-10-CM

## 2020-01-16 DIAGNOSIS — G25.0 ESSENTIAL TREMOR: Primary | ICD-10-CM

## 2020-01-16 PROCEDURE — 1160F RVW MEDS BY RX/DR IN RCRD: CPT | Performed by: PSYCHIATRY & NEUROLOGY

## 2020-01-16 PROCEDURE — 99214 OFFICE O/P EST MOD 30 MIN: CPT | Performed by: PSYCHIATRY & NEUROLOGY

## 2020-01-16 RX ORDER — ONABOTULINUMTOXINA 100 [USP'U]/1
INJECTION, POWDER, LYOPHILIZED, FOR SOLUTION INTRADERMAL; INTRAMUSCULAR
COMMUNITY
Start: 2019-11-07 | End: 2020-08-12

## 2020-01-16 NOTE — PROGRESS NOTES
Patient ID: Marija Magana is a 66 y o  female  Assessment/Plan:    Cervical dystonia  Patient presents in follow-up for cervical dystonia and essential tremor  Her cervical dystonia and dystonic head tremor are significantly improved since starting Botox  She has reduced pain and no side effects from the injection  Posturing and tremor improved  She would like to proceed with her next round of injections  Will make a slight increase in the dose and x-ray round, perhaps targeting the left trapezius  Continue zonisamide and clonazepam for hand tremor        Diagnoses and all orders for this visit:    Essential tremor    Cervical dystonia    Other orders  -     BOTOX 100 units; every 3 (three) months            Subjective:    HPI      I had the pleasure of seeing your patient, Marija Magana in the [facility] at the [facility]  Marija Magana is a 66-year-old left-handed woman who presents in follow up for her head and bilateral arm tremor which started in mid 1970s, most likely a dystonic tremor  The patient's tremor is unusual for essential tremor  Her head tremor is large amplitude, jerky and irregular  She has a sensory trick which calms the tremor  Her tremor was improved with Botox injections into her neck in the past but complicated by weakness from over treatment  She found some relief from Klonopin QHS  When I first saw her we started zonisamide which led to a dramatic improvement in her tremor  She was able to write with her left hand which she hasn't been able to do in over a year  Her head tremor seemed to improve as well       Previous medication trials:   - propranolol  - primidone   - topamax   - gabapentin      Current medications:  Zonisamide 50mg QHS  Klonopin 0 5mg BID     Interval history:  No pain during this round of botox  Head tremor was improved  Less shaking  Less pain  Finds cold showers to help with her pain    No side effects from the injections  No weakness  Usually just takes one of the Klonopin per day  Last round of the botox was 2 months ago  She would like to continue       The following portions of the patient's history were reviewed and updated as appropriate: allergies, current medications, past family history, past medical history, past social history, past surgical history and problem list          Objective:    Blood pressure 130/80, pulse 86, height 5' 3 5" (1 613 m), weight 49 9 kg (110 lb)  Physical Exam    Neurological Exam    GENERAL MEDICAL EXAMINATION:  General appearance: alert, in no apparent distress  Appropriately dressed and groomed  Conversing and interacting appropriately  Eyes: Sclera are non-injected  Ears, nose, Mouth, Throat: Mucous membranes are moist    Resp: Breathing comfortably on RA   Musculoskeletal: No evidence of deformities  No contractures  No Edema  Skin: No visible rashes  Warm and well perfused  Psych: normal and appropriate affect     Mental Status:  Alert and oriented to person place and time  Able to relate history without difficulty  Attentive to conversation  Language is fluent and appropriate with normal prosody  There were no paraphasic errors  Speech was not dysarthric  Able to follow both midline and appendicular commands  General Neurology examination:     Turn to the right  Small left shift  Left cervical trap hypertrophy with elevation of the left shoulder  Head Tremor: much improved  Still jerky and irregular but much less   Face Tremor: some around the eyes   Voice Tremor: in casual speech   Upper limb tremor       R outstretched posture: 2cm (pronation/supination)       L outstretched posture: 1cm       R Wing Beatin       L Wing Beatincm       R Kinetic: 1cm       L Kinetic: 3-4cm       R rest: 0       L rest: 0  Standing tremor: 0  Tone:       R:0        L: 0  Bradykinesia:       R: 0       L: 0   Gait: trunk lean to the right   Otherwise normal  ROS:  Review of Systems   Constitutional: Negative  Negative for appetite change and fever  HENT: Negative  Negative for hearing loss, tinnitus, trouble swallowing and voice change  Eyes: Negative  Negative for photophobia and pain  Respiratory: Negative  Negative for shortness of breath  Cardiovascular: Negative  Negative for palpitations  Gastrointestinal: Negative  Negative for nausea and vomiting  Endocrine: Negative  Negative for cold intolerance and heat intolerance  Genitourinary: Negative  Negative for dysuria, frequency and urgency  Musculoskeletal: Negative  Negative for myalgias and neck pain  Skin: Negative  Negative for rash  Neurological: Negative  Negative for dizziness, tremors, seizures, syncope, facial asymmetry, speech difficulty, weakness, light-headedness, numbness and headaches  Hematological: Negative  Does not bruise/bleed easily  Psychiatric/Behavioral: Negative  Negative for confusion, hallucinations and sleep disturbance  The above ROS was reviewed and updated       Evelyn Bess MD  Medical Director   [facility]  [facility] Specialist

## 2020-02-07 ENCOUNTER — TELEPHONE (OUTPATIENT)
Dept: NEUROLOGY | Facility: CLINIC | Age: 75
End: 2020-02-07

## 2020-02-07 NOTE — TELEPHONE ENCOUNTER
Called and spoke with the patient- I advised her that her Botox order is all ready to be scheduled for delivery  Patient is aware she must call [id number] [facility] to give consent to ship  She states last time she called she got transferred to different places and she is asking if I can conference her in to call to give consent to ship  [address]- spoke with Tom- I advised her that we were waiting on the patient's consent  I asked if I could conference the patient in to provide her consent she was agreeable  Called patient while I had Leanne Block on the phone- patient gave consent and agreed to her co-pay  Botox to be delivered on Tuesday 2/11/2020 to the [facility] location via [employer] [employer]- a signature will be required  Jeanette,    Please await Botox delivery and document once it has arrived  Please let me know if you do not receive the patient's Botox order      Thank you,    Sury Keith

## 2020-02-07 NOTE — TELEPHONE ENCOUNTER
[address]  [facility]- spoke with Aram Escamilla- [facility]  Refill request initiated  He states the patient's Botox order is ready to be scheduled for delivery  They do need to speak with the patient to obtain consent before they can schedule the order  He states the patient did not answer- he did leave a voicemail  Will contact the patient myself today as well

## 2020-02-07 NOTE — TELEPHONE ENCOUNTER
Type Date User Summary Attachment   General 02/07/2020  9:02 AM Marcos Moy care coordination  -   Note    Botox- no authorization needed   Please use Tammy      Thank you,     Tess Ascencio

## 2020-02-11 ENCOUNTER — DOCUMENTATION (OUTPATIENT)
Dept: NEUROLOGY | Facility: CLINIC | Age: 75
End: 2020-02-11

## 2020-02-11 NOTE — PROGRESS NOTES
Botox number of units: (2) 100 units  Botox quantity: 2  Arrived at what location: [place]  Lot number: [id number]  NDC: 8258-8259-43  Exp: 6/22/2020

## 2020-02-11 NOTE — TELEPHONE ENCOUNTER
Patients botox arrived at the [place] location 2 11 2020  Botox has been stored in fridge and documented

## 2020-02-15 LAB
ALBUMIN SERPL-MCNC: 4.4 G/DL (ref 3.6–5.1)
ALBUMIN/GLOB SERPL: 1.7 (CALC) (ref 1–2.5)
ALP SERPL-CCNC: 87 U/L (ref 37–153)
ALT SERPL-CCNC: 17 U/L (ref 6–29)
AST SERPL-CCNC: 20 U/L (ref 10–35)
BASOPHILS # BLD AUTO: 52 CELLS/UL (ref 0–200)
BASOPHILS NFR BLD AUTO: 0.8 %
BILIRUB SERPL-MCNC: 0.6 MG/DL (ref 0.2–1.2)
BUN SERPL-MCNC: 13 MG/DL (ref 7–25)
BUN/CREAT SERPL: 12 (CALC) (ref 6–22)
CALCIUM SERPL-MCNC: 9.5 MG/DL (ref 8.6–10.4)
CHLORIDE SERPL-SCNC: 102 MMOL/L (ref 98–110)
CHOLEST SERPL-MCNC: 182 MG/DL
CHOLEST/HDLC SERPL: 2.3 (CALC)
CO2 SERPL-SCNC: 25 MMOL/L (ref 20–32)
CREAT SERPL-MCNC: 1.07 MG/DL (ref 0.6–0.93)
EOSINOPHIL # BLD AUTO: 189 CELLS/UL (ref 15–500)
EOSINOPHIL NFR BLD AUTO: 2.9 %
ERYTHROCYTE [DISTWIDTH] IN BLOOD BY AUTOMATED COUNT: 12.3 % (ref 11–15)
EST. AVERAGE GLUCOSE BLD GHB EST-MCNC: 126 (CALC)
EST. AVERAGE GLUCOSE BLD GHB EST-SCNC: 7 (CALC)
GLOBULIN SER CALC-MCNC: 2.6 G/DL (CALC) (ref 1.9–3.7)
GLUCOSE SERPL-MCNC: 121 MG/DL (ref 65–99)
HBA1C MFR BLD: 6 % OF TOTAL HGB
HCT VFR BLD AUTO: 38.9 % (ref 35–45)
HDLC SERPL-MCNC: 78 MG/DL
HGB BLD-MCNC: 13.2 G/DL (ref 11.7–15.5)
LDLC SERPL CALC-MCNC: 84 MG/DL (CALC)
LYMPHOCYTES # BLD AUTO: 1573 CELLS/UL (ref 850–3900)
LYMPHOCYTES NFR BLD AUTO: 24.2 %
MCH RBC QN AUTO: 31.1 PG (ref 27–33)
MCHC RBC AUTO-ENTMCNC: 33.9 G/DL (ref 32–36)
MCV RBC AUTO: 91.5 FL (ref 80–100)
MONOCYTES # BLD AUTO: 644 CELLS/UL (ref 200–950)
MONOCYTES NFR BLD AUTO: 9.9 %
NEUTROPHILS # BLD AUTO: 4043 CELLS/UL (ref 1500–7800)
NEUTROPHILS NFR BLD AUTO: 62.2 %
NONHDLC SERPL-MCNC: 104 MG/DL (CALC)
PLATELET # BLD AUTO: 281 THOUSAND/UL (ref 140–400)
PMV BLD REES-ECKER: 9.6 FL (ref 7.5–12.5)
POTASSIUM SERPL-SCNC: 4.1 MMOL/L (ref 3.5–5.3)
PROT SERPL-MCNC: 7 G/DL (ref 6.1–8.1)
RBC # BLD AUTO: 4.25 MILLION/UL (ref 3.8–5.1)
SL AMB EGFR AFRICAN AMERICAN: 58 ML/MIN/1.73M2
SL AMB EGFR NON AFRICAN AMERICAN: 50 ML/MIN/1.73M2
SODIUM SERPL-SCNC: 135 MMOL/L (ref 135–146)
TRIGL SERPL-MCNC: 106 MG/DL
WBC # BLD AUTO: 6.5 THOUSAND/UL (ref 3.8–10.8)

## 2020-02-15 NOTE — PROGRESS NOTES
Assessment/Plan:    1  Hypothyroidism, unspecified type  Assessment & Plan:  She is clinically euthyroid and will continue her levothyroxine  Will check TSH with next labs in August     Orders:  -     TSH, 3rd generation with Free T4 reflex; Future; Expected date: 08/19/2020  -     TSH, 3rd generation with Free T4 reflex    2  Essential tremor    3  Mixed hyperlipidemia  Assessment & Plan:  Reviewed labs with patient and lipids are well controlled on atorvastatin  Will continue and follow labs  Continue low fat diet  Orders:  -     CBC and differential; Future; Expected date: 08/19/2020  -     Comprehensive metabolic panel; Future; Expected date: 08/19/2020  -     Lipid panel; Future; Expected date: 08/19/2020  -     CBC and differential  -     Comprehensive metabolic panel  -     Lipid panel    4  Polyp of colon, unspecified part of colon, unspecified type  Assessment & Plan:  She will follow up with GI for colonoscopy; she is due  Orders:  -     Ambulatory referral to Gastroenterology; Future    5  Hoarseness  -     Ambulatory referral to Gastroenterology; Future    6  Elevated glucose  Assessment & Plan:  A1C is a little elevated over previous but she continues to watch her diet and is very active with exercise  Will continue to follow  Orders:  -     HEMOGLOBIN A1C W/ EAG ESTIMATION; Future; Expected date: 08/19/2020    7  Cervical dystonia  Assessment & Plan:  She is improved and continues botox injections with her neurologist              There are no Patient Instructions on file for this visit  Return in about 6 months (around 8/19/2020) for 1/2 hour follow up and AWV  Subjective:      Patient ID: Vale Norris is a 66 y o  female  Chief Complaint   Patient presents with    Follow-up     lab work [facility] and [facility]       Here for a follow up visit  She feels well  She continues to see neurology for botox injections for her cervical dystonia     She is very active and exercises frequently  Follows low fat diet  Denies chest pain, dyspnea, edema, palpitations  The following portions of the patient's history were reviewed and updated as appropriate: allergies, current medications, past family history, past medical history, past social history, past surgical history and problem list     Review of Systems   Constitutional: Negative  Respiratory: Negative  Cardiovascular: Negative  Endocrine: Negative  Current Outpatient Medications   Medication Sig Dispense Refill    atorvastatin (LIPITOR) 10 mg tablet Take 1 tablet (10 mg total) by mouth daily 90 tablet 3    BOTOX 100 units every 3 (three) months       brinzolamide (AZOPT) 1 % ophthalmic suspension Apply to eye      clonazePAM (KlonoPIN) 0 5 mg tablet Take 1 tablet (0 5 mg total) by mouth 2 (two) times a day 180 tablet 3    levothyroxine 50 mcg tablet Take 1 tablet (50 mcg total) by mouth daily 90 tablet 3    travoprost (TRAVATAN Z) 0 004 % ophthalmic solution Apply to eye      zonisamide (ZONEGRAN) 50 MG capsule Take 1 capsule (50 mg total) by mouth daily at bedtime Start with 1 cap QHS and increase to 2 in 2 weeks 90 capsule 3    meclizine (ANTIVERT) 25 mg tablet Take 1 tablet (25 mg total) by mouth every 8 (eight) hours as needed for dizziness (Patient not taking: Reported on 2/7/2019 ) 30 tablet 0     No current facility-administered medications for this visit  Objective:    /72   Pulse 71   Temp (!) 97 4 °F (36 3 °C)   Resp 18   Ht 5' 3 5" (1 613 m)   Wt 48 1 kg (106 lb)   SpO2 98%   BMI 18 48 kg/m²        Physical Exam   Constitutional: She appears well-developed and well-nourished  Eyes: Conjunctivae are normal    Neck: Neck supple  No JVD present  No thyromegaly present  Cardiovascular: Normal rate, regular rhythm, normal heart sounds and intact distal pulses  Exam reveals no gallop and no friction rub  No murmur heard    Pulmonary/Chest: Effort normal and breath sounds normal  She has no wheezes  She has no rales  Abdominal: Soft  Bowel sounds are normal  She exhibits no distension  There is no tenderness  Musculoskeletal: She exhibits no edema  Neurological:   Facial tremor noted                Chante Pimentel MD

## 2020-02-19 ENCOUNTER — OFFICE VISIT (OUTPATIENT)
Dept: FAMILY MEDICINE CLINIC | Facility: CLINIC | Age: 75
End: 2020-02-19
Payer: COMMERCIAL

## 2020-02-19 VITALS
TEMPERATURE: 97.4 F | HEART RATE: 71 BPM | WEIGHT: 106 LBS | RESPIRATION RATE: 18 BRPM | OXYGEN SATURATION: 98 % | DIASTOLIC BLOOD PRESSURE: 72 MMHG | SYSTOLIC BLOOD PRESSURE: 130 MMHG | BODY MASS INDEX: 18.1 KG/M2 | HEIGHT: 64 IN

## 2020-02-19 DIAGNOSIS — R73.09 ELEVATED GLUCOSE: ICD-10-CM

## 2020-02-19 DIAGNOSIS — E03.9 HYPOTHYROIDISM, UNSPECIFIED TYPE: Primary | ICD-10-CM

## 2020-02-19 DIAGNOSIS — E78.2 MIXED HYPERLIPIDEMIA: ICD-10-CM

## 2020-02-19 DIAGNOSIS — K63.5 POLYP OF COLON, UNSPECIFIED PART OF COLON, UNSPECIFIED TYPE: ICD-10-CM

## 2020-02-19 DIAGNOSIS — G24.3 CERVICAL DYSTONIA: ICD-10-CM

## 2020-02-19 DIAGNOSIS — R49.0 HOARSENESS: ICD-10-CM

## 2020-02-19 DIAGNOSIS — G25.0 ESSENTIAL TREMOR: ICD-10-CM

## 2020-02-19 PROCEDURE — 3008F BODY MASS INDEX DOCD: CPT | Performed by: INTERNAL MEDICINE

## 2020-02-19 PROCEDURE — 99214 OFFICE O/P EST MOD 30 MIN: CPT | Performed by: INTERNAL MEDICINE

## 2020-02-19 PROCEDURE — 4040F PNEUMOC VAC/ADMIN/RCVD: CPT | Performed by: INTERNAL MEDICINE

## 2020-02-19 PROCEDURE — 1160F RVW MEDS BY RX/DR IN RCRD: CPT | Performed by: INTERNAL MEDICINE

## 2020-02-19 PROCEDURE — 1036F TOBACCO NON-USER: CPT | Performed by: INTERNAL MEDICINE

## 2020-02-19 NOTE — ASSESSMENT & PLAN NOTE
A1C is a little elevated over previous but she continues to watch her diet and is very active with exercise  Will continue to follow

## 2020-02-19 NOTE — ASSESSMENT & PLAN NOTE
She is clinically euthyroid and will continue her levothyroxine    Will check TSH with next labs in August

## 2020-02-19 NOTE — ASSESSMENT & PLAN NOTE
Reviewed labs with patient and lipids are well controlled on atorvastatin  Will continue and follow labs  Continue low fat diet

## 2020-02-24 DIAGNOSIS — R91.1 LUNG NODULE, SOLITARY: Primary | ICD-10-CM

## 2020-02-28 ENCOUNTER — DOCUMENTATION (OUTPATIENT)
Dept: NEUROLOGY | Facility: CLINIC | Age: 75
End: 2020-02-28

## 2020-02-28 NOTE — PROGRESS NOTES
Per Jeanette- patient has 300 units total in the fridge  One vial was just delivered in February for her upcoming appointment  I will not order any additional vials until all remaining Botox has been used

## 2020-03-03 ENCOUNTER — PROCEDURE VISIT (OUTPATIENT)
Dept: NEUROLOGY | Facility: CLINIC | Age: 75
End: 2020-03-03
Payer: COMMERCIAL

## 2020-03-03 VITALS — HEART RATE: 72 BPM | TEMPERATURE: 97.8 F | DIASTOLIC BLOOD PRESSURE: 80 MMHG | SYSTOLIC BLOOD PRESSURE: 130 MMHG

## 2020-03-03 DIAGNOSIS — G24.3 CERVICAL DYSTONIA: Primary | ICD-10-CM

## 2020-03-03 PROCEDURE — 64616 CHEMODENERV MUSC NECK DYSTON: CPT | Performed by: PSYCHIATRY & NEUROLOGY

## 2020-03-03 NOTE — PROGRESS NOTES
Kirstne Agustin returned today to the Botox clinic in the [address] at the [facility]  As you know, the patient is a 66 y o  female who is receiving Botox injections for cervical dystonia  The effect of the previous injection was very positive, as recently reviewed  Less tremor, less posturing, less abnormal movements, less pain  Side effects included none  Medications:      Current Outpatient Medications on File Prior to Visit   Medication Sig Dispense Refill    atorvastatin (LIPITOR) 10 mg tablet Take 1 tablet (10 mg total) by mouth daily 90 tablet 3    BOTOX 100 units every 3 (three) months       brinzolamide (AZOPT) 1 % ophthalmic suspension Apply to eye      clonazePAM (KlonoPIN) 0 5 mg tablet Take 1 tablet (0 5 mg total) by mouth 2 (two) times a day 180 tablet 3    levothyroxine 50 mcg tablet Take 1 tablet (50 mcg total) by mouth daily 90 tablet 3    meclizine (ANTIVERT) 25 mg tablet Take 1 tablet (25 mg total) by mouth every 8 (eight) hours as needed for dizziness (Patient not taking: Reported on 2/7/2019 ) 30 tablet 0    travoprost (TRAVATAN Z) 0 004 % ophthalmic solution Apply to eye      zonisamide (ZONEGRAN) 50 MG capsule Take 1 capsule (50 mg total) by mouth daily at bedtime Start with 1 cap QHS and increase to 2 in 2 weeks 90 capsule 3     No current facility-administered medications on file prior to visit  Focused Neurologic examination:      Turn to the right and tilt  Small left shift  Left cervical trap hypertrophy with elevation of the left shoulder  Irregular jerky moderate amplitude head tremor       Injection details:     After sterile preparation of the skin, a total of 130 units of Botox were injected without EMG guidance  Each 100 units was diluted in 1 cc of PFNS  70 units were discarded as waste        The injections were done as follows into the:  RIGHT Splenious capitis: 45 units (increased dose)  RIGHT levator: 20 units (higher location)   LEFT Splenious capitis: 35 units (increased dose)   LEFT cervical trapezius: 20 units   LEFT shoulder trapezius: 10 units      The procedure was well tolerated without immediate complications  The patient will return in 12 weeks for further evaluation and treatment  Previous muscles injected:   - Right Levator injection at base of the neck caused pain

## 2020-03-04 ENCOUNTER — HOSPITAL ENCOUNTER (OUTPATIENT)
Dept: RADIOLOGY | Facility: HOSPITAL | Age: 75
Discharge: HOME | End: 2020-03-04
Attending: INTERNAL MEDICINE
Payer: COMMERCIAL

## 2020-03-04 DIAGNOSIS — R91.1 LUNG NODULE, SOLITARY: ICD-10-CM

## 2020-03-04 PROCEDURE — 71250 CT THORAX DX C-: CPT

## 2020-05-20 ENCOUNTER — TELEPHONE (OUTPATIENT)
Dept: NEUROLOGY | Facility: CLINIC | Age: 75
End: 2020-05-20

## 2020-06-23 ENCOUNTER — PROCEDURE VISIT (OUTPATIENT)
Dept: NEUROLOGY | Facility: CLINIC | Age: 75
End: 2020-06-23
Payer: COMMERCIAL

## 2020-06-23 VITALS — SYSTOLIC BLOOD PRESSURE: 146 MMHG | HEART RATE: 76 BPM | TEMPERATURE: 98.5 F | DIASTOLIC BLOOD PRESSURE: 65 MMHG

## 2020-06-23 DIAGNOSIS — G24.3 CERVICAL DYSTONIA: Primary | ICD-10-CM

## 2020-06-23 DIAGNOSIS — G25.0 ESSENTIAL TREMOR: ICD-10-CM

## 2020-06-23 PROCEDURE — 64616 CHEMODENERV MUSC NECK DYSTON: CPT | Performed by: PSYCHIATRY & NEUROLOGY

## 2020-06-23 RX ORDER — ZONISAMIDE 50 MG/1
50 CAPSULE ORAL
Qty: 90 CAPSULE | Refills: 3 | Status: SHIPPED | OUTPATIENT
Start: 2020-06-23 | End: 2021-09-16

## 2020-06-23 RX ORDER — CLONAZEPAM 0.5 MG/1
0.5 TABLET ORAL DAILY
Qty: 90 TABLET | Refills: 3 | Status: SHIPPED | OUTPATIENT
Start: 2020-06-23 | End: 2021-03-24

## 2020-06-23 RX ORDER — CLONAZEPAM 0.5 MG/1
0.5 TABLET ORAL 2 TIMES DAILY
Qty: 180 TABLET | Refills: 3 | Status: SHIPPED | OUTPATIENT
Start: 2020-06-23 | End: 2020-06-23

## 2020-08-12 DIAGNOSIS — G24.3 CERVICAL DYSTONIA: Primary | ICD-10-CM

## 2020-08-12 RX ORDER — ONABOTULINUMTOXINA 100 [USP'U]/1
INJECTION, POWDER, LYOPHILIZED, FOR SOLUTION INTRADERMAL; INTRAMUSCULAR
Qty: 2 EACH | Refills: 3 | Status: SHIPPED | OUTPATIENT
Start: 2020-08-12 | End: 2020-11-11

## 2020-08-20 DIAGNOSIS — Z20.822 COVID-19 RULED OUT BY LABORATORY TESTING: Primary | ICD-10-CM

## 2020-08-20 DIAGNOSIS — Z20.822 COVID-19 RULED OUT BY LABORATORY TESTING: ICD-10-CM

## 2020-08-20 LAB
ALBUMIN SERPL-MCNC: 4.3 G/DL (ref 3.6–5.1)
ALBUMIN/GLOB SERPL: 1.7 (CALC) (ref 1–2.5)
ALP SERPL-CCNC: 74 U/L (ref 37–153)
ALT SERPL-CCNC: 12 U/L (ref 6–29)
AST SERPL-CCNC: 17 U/L (ref 10–35)
BASOPHILS # BLD AUTO: 58 CELLS/UL (ref 0–200)
BASOPHILS NFR BLD AUTO: 0.9 %
BILIRUB SERPL-MCNC: 0.6 MG/DL (ref 0.2–1.2)
BUN SERPL-MCNC: 9 MG/DL (ref 7–25)
BUN/CREAT SERPL: ABNORMAL (CALC) (ref 6–22)
CALCIUM SERPL-MCNC: 9.5 MG/DL (ref 8.6–10.4)
CHLORIDE SERPL-SCNC: 101 MMOL/L (ref 98–110)
CHOLEST SERPL-MCNC: 155 MG/DL
CHOLEST/HDLC SERPL: 2.4 (CALC)
CO2 SERPL-SCNC: 28 MMOL/L (ref 20–32)
CREAT SERPL-MCNC: 0.93 MG/DL (ref 0.6–0.93)
EOSINOPHIL # BLD AUTO: 198 CELLS/UL (ref 15–500)
EOSINOPHIL NFR BLD AUTO: 3.1 %
ERYTHROCYTE [DISTWIDTH] IN BLOOD BY AUTOMATED COUNT: 11.7 % (ref 11–15)
EST. AVERAGE GLUCOSE BLD GHB EST-MCNC: 123 (CALC)
EST. AVERAGE GLUCOSE BLD GHB EST-SCNC: 6.8 (CALC)
GLOBULIN SER CALC-MCNC: 2.6 G/DL (CALC) (ref 1.9–3.7)
GLUCOSE SERPL-MCNC: 101 MG/DL (ref 65–99)
HBA1C MFR BLD: 5.9 % OF TOTAL HGB
HCT VFR BLD AUTO: 35 % (ref 35–45)
HDLC SERPL-MCNC: 65 MG/DL
HGB BLD-MCNC: 11.6 G/DL (ref 11.7–15.5)
LDLC SERPL CALC-MCNC: 71 MG/DL (CALC)
LYMPHOCYTES # BLD AUTO: 1491 CELLS/UL (ref 850–3900)
LYMPHOCYTES NFR BLD AUTO: 23.3 %
MCH RBC QN AUTO: 30.6 PG (ref 27–33)
MCHC RBC AUTO-ENTMCNC: 33.1 G/DL (ref 32–36)
MCV RBC AUTO: 92.3 FL (ref 80–100)
MONOCYTES # BLD AUTO: 570 CELLS/UL (ref 200–950)
MONOCYTES NFR BLD AUTO: 8.9 %
NEUTROPHILS # BLD AUTO: 4083 CELLS/UL (ref 1500–7800)
NEUTROPHILS NFR BLD AUTO: 63.8 %
NONHDLC SERPL-MCNC: 90 MG/DL (CALC)
PLATELET # BLD AUTO: 318 THOUSAND/UL (ref 140–400)
PMV BLD REES-ECKER: 9.1 FL (ref 7.5–12.5)
POTASSIUM SERPL-SCNC: 4.8 MMOL/L (ref 3.5–5.3)
PROT SERPL-MCNC: 6.9 G/DL (ref 6.1–8.1)
RBC # BLD AUTO: 3.79 MILLION/UL (ref 3.8–5.1)
SL AMB EGFR AFRICAN AMERICAN: 69 ML/MIN/1.73M2
SL AMB EGFR NON AFRICAN AMERICAN: 59 ML/MIN/1.73M2
SODIUM SERPL-SCNC: 135 MMOL/L (ref 135–146)
TRIGL SERPL-MCNC: 106 MG/DL
TSH SERPL-ACNC: 1.29 MIU/L (ref 0.4–4.5)
WBC # BLD AUTO: 6.4 THOUSAND/UL (ref 3.8–10.8)

## 2020-08-20 PROCEDURE — U0003 INFECTIOUS AGENT DETECTION BY NUCLEIC ACID (DNA OR RNA); SEVERE ACUTE RESPIRATORY SYNDROME CORONAVIRUS 2 (SARS-COV-2) (CORONAVIRUS DISEASE [COVID-19]), AMPLIFIED PROBE TECHNIQUE, MAKING USE OF HIGH THROUGHPUT TECHNOLOGIES AS DESCRIBED BY CMS-2020-01-R: HCPCS | Performed by: INTERNAL MEDICINE

## 2020-08-21 LAB — SARS-COV-2 RNA SPEC QL NAA+PROBE: NOT DETECTED

## 2020-08-24 RX ORDER — POLYETHYLENE GLYCOL 3350, SODIUM CHLORIDE, SODIUM BICARBONATE, POTASSIUM CHLORIDE 420; 11.2; 5.72; 1.48 G/4L; G/4L; G/4L; G/4L
POWDER, FOR SOLUTION ORAL ONCE
COMMUNITY
Start: 2020-08-14 | End: 2021-02-10

## 2020-08-24 NOTE — PRE-PROCEDURE INSTRUCTIONS
Pre-Surgery Instructions:   Medication Instructions    atorvastatin (LIPITOR) 10 mg tablet Patient was instructed by Physician and understands   Botox 100 units Patient was instructed by Physician and understands   brinzolamide (AZOPT) 1 % ophthalmic suspension Patient was instructed by Physician and understands   clonazePAM (KlonoPIN) 0 5 mg tablet Patient was instructed by Physician and understands   levothyroxine 50 mcg tablet Patient was instructed by Physician and understands   polyethylene glycol-electrolytes (NULYTELY) 4000 mL solution Patient was instructed by Physician and understands   travoprost (TRAVATAN Z) 0 004 % ophthalmic solution Patient was instructed by Physician and understands   zonisamide (ZONEGRAN) 50 MG capsule Patient was instructed by Physician and understands  Pt to follow Dr Leonor Mackay instructions  Pt to use azopt the am of the procedure    Myles Sebastian

## 2020-08-24 NOTE — PROGRESS NOTES
Assessment/Plan:    1  Medicare annual wellness visit, subsequent    2  Mixed hyperlipidemia  Assessment & Plan:  She continues on atorvastatin and we reviewed her labs; these are within goal   Continue healthy diet and exercise  Orders:  -     atorvastatin (LIPITOR) 10 mg tablet; Take 1 tablet (10 mg total) by mouth every morning  -     CBC and differential; Future; Expected date: 02/27/2021  -     Comprehensive metabolic panel; Future; Expected date: 02/27/2021  -     Lipid panel; Future; Expected date: 02/27/2021    3  Hypothyroidism, unspecified type  Assessment & Plan: We reviewed her recent labwork and TSH is within goal, continue levothyroxine at current dose  Orders:  -     levothyroxine 50 mcg tablet; Take 1 tablet (50 mcg total) by mouth every morning    4  Elevated glucose  Assessment & Plan:  These labs are improved and she will continue her weight control and exercise efforts  Orders:  -     HEMOGLOBIN A1C W/ EAG ESTIMATION; Future; Expected date: 02/27/2021    5  LLQ abdominal pain  Comments:  Colonoscopy was unremarkable other than mild erythema  Will check CT to evaluate for other pathology  Follow up after results  Orders:  -     CT abdomen pelvis w contrast; Future; Expected date: 08/27/2020    6  Need for vaccination  -     influenza vaccine, high-dose, PF 0 7 mL (FLUZONE HIGH-DOSE)    7  Weight loss  -     CT abdomen pelvis w contrast; Future; Expected date: 08/27/2020    8  Essential tremor  Assessment & Plan:  Care per neurology  9  BMI less than 19,adult        BMI Counseling: Body mass index is 17 51 kg/m²  The BMI is below normal  Patient was advised to gain weight  Patient Instructions       Medicare Preventive Visit Patient Instructions  Thank you for completing your Welcome to Medicare Visit or Medicare Annual Wellness Visit today  Your next wellness visit will be due in one year (8/27/2021)    The screening/preventive services that you may require over the next 5-10 years are detailed below  Some tests may not apply to you based off risk factors and/or age  Screening tests ordered at today's visit but not completed yet may show as past due  Also, please note that scanned in results may not display below  Preventive Screenings:  Service Recommendations Previous Testing/Comments   Colorectal Cancer Screening  * Colonoscopy    * Fecal Occult Blood Test (FOBT)/Fecal Immunochemical Test (FIT)  * Fecal DNA/Cologuard Test  * Flexible Sigmoidoscopy Age: 54-65 years old   Colonoscopy: every 10 years (may be performed more frequently if at higher risk)  OR  FOBT/FIT: every 1 year  OR  Cologuard: every 3 years  OR  Sigmoidoscopy: every 5 years  Screening may be recommended earlier than age 48 if at higher risk for colorectal cancer  Also, an individualized decision between you and your healthcare provider will decide whether screening between the ages of 74-80 would be appropriate  Colonoscopy: 08/26/2020  FOBT/FIT: Not on file  Cologuard: Not on file  Sigmoidoscopy: Not on file         Breast Cancer Screening Age: 36 years old  Frequency: every 1-2 years  Not required if history of left and right mastectomy Mammogram: 11/08/2018    Screening Current   Cervical Cancer Screening Between the ages of 21-29, pap smear recommended once every 3 years  Between the ages of 33-67, can perform pap smear with HPV co-testing every 5 years     Recommendations may differ for women with a history of total hysterectomy, cervical cancer, or abnormal pap smears in past  Pap Smear: Not on file    Screening Not Indicated   Hepatitis C Screening Once for adults born between 1945 and 1965  More frequently in patients at high risk for Hepatitis C Hep C Antibody: Not on file       Diabetes Screening 1-2 times per year if you're at risk for diabetes or have pre-diabetes Fasting glucose: No results in last 5 years   A1C: 5 9 % of total Hgb    Screening Current   Cholesterol Screening Once every 5 years if you don't have a lipid disorder  May order more often based on risk factors  Lipid panel: 08/19/2020    Screening Not Indicated  History Lipid Disorder     Other Preventive Screenings Covered by Medicare:  1  Abdominal Aortic Aneurysm (AAA) Screening: covered once if your at risk  You're considered to be at risk if you have a family history of AAA  2  Lung Cancer Screening: covers low dose CT scan once per year if you meet all of the following conditions: (1) Age 50-69; (2) No signs or symptoms of lung cancer; (3) Current smoker or have quit smoking within the last 15 years; (4) You have a tobacco smoking history of at least 30 pack years (packs per day multiplied by number of years you smoked); (5) You get a written order from a healthcare provider  3  Glaucoma Screening: covered annually if you're considered high risk: (1) You have diabetes OR (2) Family history of glaucoma OR (3)  aged 48 and older OR (3)  American aged 72 and older  3  Osteoporosis Screening: covered every 2 years if you meet one of the following conditions: (1) You're estrogen deficient and at risk for osteoporosis based off medical history and other findings; (2) Have a vertebral abnormality; (3) On glucocorticoid therapy for more than 3 months; (4) Have primary hyperparathyroidism; (5) On osteoporosis medications and need to assess response to drug therapy  · Last bone density test (DXA Scan): Not on file  5  HIV Screening: covered annually if you're between the age of 12-76  Also covered annually if you are younger than 13 and older than 72 with risk factors for HIV infection  For pregnant patients, it is covered up to 3 times per pregnancy      Immunizations:  Immunization Recommendations   Influenza Vaccine Annual influenza vaccination during flu season is recommended for all persons aged >= 6 months who do not have contraindications   Pneumococcal Vaccine (Prevnar and Pneumovax)  * Prevnar = PCV13  * Pneumovax = PPSV23   Adults 25-60 years old: 1-3 doses may be recommended based on certain risk factors  Adults 72 years old: Prevnar (PCV13) vaccine recommended followed by Pneumovax (PPSV23) vaccine  If already received PPSV23 since turning 65, then PCV13 recommended at least one year after PPSV23 dose  Hepatitis B Vaccine 3 dose series if at intermediate or high risk (ex: diabetes, end stage renal disease, liver disease)   Tetanus (Td) Vaccine - COST NOT COVERED BY MEDICARE PART B Following completion of primary series, a booster dose should be given every 10 years to maintain immunity against tetanus  Td may also be given as tetanus wound prophylaxis  Tdap Vaccine - COST NOT COVERED BY MEDICARE PART B Recommended at least once for all adults  For pregnant patients, recommended with each pregnancy  Shingles Vaccine (Shingrix) - COST NOT COVERED BY MEDICARE PART B  2 shot series recommended in those aged 48 and above     Health Maintenance Due:  There are no preventive care reminders to display for this patient  Immunizations Due:      Topic Date Due    DTaP,Tdap,and Td Vaccines (1 - Tdap) 05/07/1966    Influenza Vaccine  07/01/2020     Advance Directives   What are advance directives? Advance directives are legal documents that state your wishes and plans for medical care  These plans are made ahead of time in case you lose your ability to make decisions for yourself  Advance directives can apply to any medical decision, such as the treatments you want, and if you want to donate organs  What are the types of advance directives? There are many types of advance directives, and each state has rules about how to use them  You may choose a combination of any of the following:  · Living will: This is a written record of the treatment you want  You can also choose which treatments you do not want, which to limit, and which to stop at a certain time  This includes surgery, medicine, IV fluid, and tube feedings  · Durable power of  for healthcare [facility]): This is a written record that states who you want to make healthcare choices for you when you are unable to make them for yourself  This person, called a proxy, is usually a family member or a friend  You may choose more than 1 proxy  · Do not resuscitate (DNR) order:  A DNR order is used in case your heart stops beating or you stop breathing  It is a request not to have certain forms of treatment, such as CPR  A DNR order may be included in other types of advance directives  · Medical directive: This covers the care that you want if you are in a coma, near death, or unable to make decisions for yourself  You can list the treatments you want for each condition  Treatment may include pain medicine, surgery, blood transfusions, dialysis, IV or tube feedings, and a ventilator (breathing machine)  · Values history: This document has questions about your views, beliefs, and how you feel and think about life  This information can help others choose the care that you would choose  Why are advance directives important? An advance directive helps you control your care  Although spoken wishes may be used, it is better to have your wishes written down  Spoken wishes can be misunderstood, or not followed  Treatments may be given even if you do not want them  An advance directive may make it easier for your family to make difficult choices about your care  Underweight  Underweight is defined as having a body mass index (BMI) of less than 18 5 kg/m2   Anorexia  is a loss of appetite, decreased food intake, or both  Your appetite naturally decreases as you get older  You also get full faster than you used to  This occurs because your body needs less energy  Other body changes can also lead to a decreased appetite  Even though some appetite loss is normal, you still need to get enough calories and nutrients to keep you healthy   You can start to lose too much weight if you do not eat as much food as your body needs  Unwanted weight loss can cause health problems, or worsen health problems you already have  You can also become dehydrated if you do not drink enough liquid  How to eat healthy and get enough nutrients:   · Choose healthy foods  Eat a variety of fruits, vegetables, whole grains, low-fat dairy foods, lean meats, and other protein foods  Limit foods high in fat, sugar, and salt  Limit or avoid alcohol as directed  Work with a dietitian to help you plan your meals if you need to follow a special diet  A dietitian can also teach you how to modify foods if you have trouble chewing or swallowing  · Snack on healthy foods between meals  if you only eat a small amount during meals  Snacks provide extra healthy nutrients and calories between meals  Examples include fruit, cheese, and whole grain crackers  · Drink liquids as directed  to avoid dehydration  Drink liquids between meals if they cause you to get full too quickly during meals  Ask how much liquid to drink each day and which liquids are best for you  · Use herbs, spices, and flavor enhancers to add flavor to foods  Avoid using herbs and spice blends that also contain sodium  Ask your healthcare provider or dietitian about flavor enhancers  Flavor enhancers with ham, natural fitzpatrick, and roast beef flavors can also be sprinkled on food to add flavor  · Share meals with others as often as you can  Eating with others may help you to eat better during meal time  Ask family members, neighbors, or friends to join you for lunch  There are also senior centers where you can meet people, and share meals with them  · Ask family and friends for help  with shopping or preparing foods  Ask for a ride to the grocery store, if needed  © Copyright [employer] 2018 Information is for End User's use only and may not be sold, redistributed or otherwise used for commercial purposes   All illustrations and images included in [employer] are the copyrighted property of [employer]  or [address]        Return in about 6 months (around 2/27/2021) for 1/2 hour AWV and follow up  Subjective:      Patient ID: Sami Mike is a 66 y o  female  Chief Complaint   Patient presents with    Follow-up     6 month [id number]       Here for follow up appointment  She had been having very bad abdominal pain in left lower side  It will be a bit better if she forces herself to have a BM but she will only have diarrhea and then things will get a bit better but will recur  She had colonoscopy yesterday which only showed mild erythema in that  She sees Dr Pepe Harrington in early September to follow up  She has lost some weight but does not want to eat when she is in pain  Otherwise she is here for follow up of hyperlipidemia and we reviewed her bloodwork today  She remains very active, even despite her recent symptoms, and follows a very healthy diet  The following portions of the patient's history were reviewed and updated as appropriate: allergies, current medications, past family history, past medical history, past social history, past surgical history and problem list     Review of Systems   Constitutional: Positive for unexpected weight change  Respiratory: Negative  Cardiovascular: Negative  Gastrointestinal: Positive for abdominal pain and diarrhea  Negative for abdominal distention and blood in stool           Current Outpatient Medications   Medication Sig Dispense Refill    atorvastatin (LIPITOR) 10 mg tablet Take 1 tablet (10 mg total) by mouth every morning 90 tablet 3    Botox 100 units INJECT UP  UNITS  INTRAMUSCULARLY EVERY 3  MONTHS (GIVEN AT MD OFFICE, DISCARD UNUSED) (Patient taking differently: Cervical Dystonia) 2 each 3    brinzolamide (AZOPT) 1 % ophthalmic suspension Administer to the right eye every morning       clonazePAM (KlonoPIN) 0 5 mg tablet Take 1 tablet (0 5 mg total) by mouth daily (Patient taking differently: Take 0 5 mg by mouth every morning ) 90 tablet 3    levothyroxine 50 mcg tablet Take 1 tablet (50 mcg total) by mouth every morning 90 tablet 3    travoprost (TRAVATAN Z) 0 004 % ophthalmic solution Apply 1 drop to eye daily at bedtime Both eyes      zonisamide (ZONEGRAN) 50 MG capsule Take 1 capsule (50 mg total) by mouth daily at bedtime (Patient taking differently: Take 50 mg by mouth daily at bedtime Essential tremor of the hands and head) 90 capsule 3    polyethylene glycol-electrolytes (NULYTELY) 4000 mL solution once       No current facility-administered medications for this visit  Objective:    /80   Pulse 93   Temp 97 5 °F (36 4 °C)   Resp 16   Ht 5' 4" (1 626 m)   Wt 46 3 kg (102 lb)   SpO2 97%   BMI 17 51 kg/m²        Physical Exam  Constitutional:       Appearance: She is well-developed  Eyes:      Conjunctiva/sclera: Conjunctivae normal    Neck:      Musculoskeletal: Neck supple  Thyroid: No thyromegaly  Vascular: No JVD  Cardiovascular:      Rate and Rhythm: Normal rate and regular rhythm  Heart sounds: Normal heart sounds  No murmur  No friction rub  No gallop  Pulmonary:      Effort: Pulmonary effort is normal       Breath sounds: Normal breath sounds  No wheezing or rales  Abdominal:      General: Bowel sounds are normal  There is no distension  Palpations: Abdomen is soft  Tenderness: There is abdominal tenderness in the suprapubic area and left lower quadrant  There is no guarding or rebound                  Romain Chery MD

## 2020-08-26 ENCOUNTER — ANESTHESIA EVENT (OUTPATIENT)
Dept: GASTROENTEROLOGY | Facility: AMBULARY SURGERY CENTER | Age: 75
End: 2020-08-26

## 2020-08-26 ENCOUNTER — HOSPITAL ENCOUNTER (OUTPATIENT)
Dept: GASTROENTEROLOGY | Facility: AMBULARY SURGERY CENTER | Age: 75
Setting detail: OUTPATIENT SURGERY
Discharge: HOME | End: 2020-08-26
Attending: INTERNAL MEDICINE | Admitting: INTERNAL MEDICINE
Payer: COMMERCIAL

## 2020-08-26 ENCOUNTER — ANESTHESIA (OUTPATIENT)
Dept: GASTROENTEROLOGY | Facility: AMBULARY SURGERY CENTER | Age: 75
End: 2020-08-26

## 2020-08-26 VITALS
HEART RATE: 61 BPM | RESPIRATION RATE: 18 BRPM | BODY MASS INDEX: 16.97 KG/M2 | TEMPERATURE: 96 F | HEIGHT: 64 IN | SYSTOLIC BLOOD PRESSURE: 129 MMHG | OXYGEN SATURATION: 99 % | WEIGHT: 99.38 LBS | DIASTOLIC BLOOD PRESSURE: 61 MMHG

## 2020-08-26 DIAGNOSIS — R19.7 DIARRHEA, UNSPECIFIED: ICD-10-CM

## 2020-08-26 DIAGNOSIS — R10.30 LOWER ABDOMINAL PAIN, UNSPECIFIED: ICD-10-CM

## 2020-08-26 PROCEDURE — 88305 TISSUE EXAM BY PATHOLOGIST: CPT | Performed by: PATHOLOGY

## 2020-08-26 RX ORDER — SODIUM CHLORIDE, SODIUM LACTATE, POTASSIUM CHLORIDE, CALCIUM CHLORIDE 600; 310; 30; 20 MG/100ML; MG/100ML; MG/100ML; MG/100ML
INJECTION, SOLUTION INTRAVENOUS CONTINUOUS PRN
Status: DISCONTINUED | OUTPATIENT
Start: 2020-08-26 | End: 2020-08-26

## 2020-08-26 RX ORDER — PROPOFOL 10 MG/ML
INJECTION, EMULSION INTRAVENOUS CONTINUOUS PRN
Status: DISCONTINUED | OUTPATIENT
Start: 2020-08-26 | End: 2020-08-26

## 2020-08-26 RX ORDER — SODIUM CHLORIDE, SODIUM LACTATE, POTASSIUM CHLORIDE, CALCIUM CHLORIDE 600; 310; 30; 20 MG/100ML; MG/100ML; MG/100ML; MG/100ML
125 INJECTION, SOLUTION INTRAVENOUS CONTINUOUS
Status: CANCELLED | OUTPATIENT
Start: 2020-08-26

## 2020-08-26 RX ORDER — PROPOFOL 10 MG/ML
INJECTION, EMULSION INTRAVENOUS AS NEEDED
Status: DISCONTINUED | OUTPATIENT
Start: 2020-08-26 | End: 2020-08-26

## 2020-08-26 RX ORDER — LIDOCAINE HYDROCHLORIDE 10 MG/ML
INJECTION, SOLUTION EPIDURAL; INFILTRATION; INTRACAUDAL; PERINEURAL AS NEEDED
Status: DISCONTINUED | OUTPATIENT
Start: 2020-08-26 | End: 2020-08-26

## 2020-08-26 RX ADMIN — PROPOFOL 50 MG: 10 INJECTION, EMULSION INTRAVENOUS at 08:19

## 2020-08-26 RX ADMIN — PROPOFOL 30 MG: 10 INJECTION, EMULSION INTRAVENOUS at 08:20

## 2020-08-26 RX ADMIN — SODIUM CHLORIDE, SODIUM LACTATE, POTASSIUM CHLORIDE, AND CALCIUM CHLORIDE: .6; .31; .03; .02 INJECTION, SOLUTION INTRAVENOUS at 08:13

## 2020-08-26 RX ADMIN — LIDOCAINE HYDROCHLORIDE 80 MG: 10 INJECTION, SOLUTION EPIDURAL; INFILTRATION; INTRACAUDAL; PERINEURAL at 08:19

## 2020-08-26 RX ADMIN — PROPOFOL 140 MCG/KG/MIN: 10 INJECTION, EMULSION INTRAVENOUS at 08:19

## 2020-08-26 NOTE — ANESTHESIA PREPROCEDURE EVALUATION
Procedure:  COLONOSCOPY    Relevant Problems   CARDIO   (+) Mixed hyperlipidemia      ENDO   (+) Hypothyroidism      Other   (+) Cervical dystonia   (+) Essential tremor        Physical Exam    Airway    Mallampati score: I  TM Distance: >3 FB  Neck ROM: full     Dental       Cardiovascular  Rhythm: regular, Rate: normal, Cardiovascular exam normal    Pulmonary  Pulmonary exam normal     Other Findings        Anesthesia Plan  ASA Score- 2     Anesthesia Type- IV sedation with anesthesia with ASA Monitors  Additional Monitors:   Airway Plan:           Plan Factors-Exercise tolerance (METS): >4 METS  Chart reviewed  Patient summary reviewed  Patient is not a current smoker  Patient did not smoke on day of surgery  Induction- intravenous  Postoperative Plan-     Informed Consent- Anesthetic plan and risks discussed with patient  I personally reviewed this patient with the CRNA  Discussed and agreed on the Anesthesia Plan with the CRNA  Sabrina Jefferson

## 2020-08-26 NOTE — H&P
H&P EXAM - Outpatient Endoscopy   Ryan Florian 66 y o  female MRN: [id number]    [address] [facility]    Encounter: [id number]        Impression:  Lower abdominal pain and change in bowel habit    Plan:  Colonoscopy under conscious sedation    Chief Complaint:  66 year female is here for elective colonoscopy for lower abdominal pain and chronic constipation    Physical Exam:  General-no icterus, no pallor, no cervical lymphadenopathy   Chest:  Bilateral good air entry   Heart:  S1-S2 regular

## 2020-08-26 NOTE — ANESTHESIA POSTPROCEDURE EVALUATION
Post-Op Assessment Note    CV Status:  Stable  Pain Score: 0    Pain management: adequate     Mental Status:  Alert and awake   Hydration Status:  Euvolemic   PONV Controlled:  Controlled   Airway Patency:  Patent      Post Op Vitals Reviewed: Yes      Staff: Anesthesiologist         No complications documented      /50 (08/26/20 0846)    Temp     Pulse 56 (08/26/20 0846)   Resp 18 (08/26/20 0846)    SpO2 99 % (08/26/20 0846)

## 2020-08-27 ENCOUNTER — OFFICE VISIT (OUTPATIENT)
Dept: FAMILY MEDICINE CLINIC | Facility: CLINIC | Age: 75
End: 2020-08-27
Payer: COMMERCIAL

## 2020-08-27 VITALS
RESPIRATION RATE: 16 BRPM | WEIGHT: 102 LBS | DIASTOLIC BLOOD PRESSURE: 80 MMHG | TEMPERATURE: 97.5 F | SYSTOLIC BLOOD PRESSURE: 126 MMHG | HEIGHT: 64 IN | BODY MASS INDEX: 17.42 KG/M2 | HEART RATE: 93 BPM | OXYGEN SATURATION: 97 %

## 2020-08-27 DIAGNOSIS — R73.09 ELEVATED GLUCOSE: ICD-10-CM

## 2020-08-27 DIAGNOSIS — E03.9 HYPOTHYROIDISM, UNSPECIFIED TYPE: ICD-10-CM

## 2020-08-27 DIAGNOSIS — Z23 NEED FOR VACCINATION: ICD-10-CM

## 2020-08-27 DIAGNOSIS — G25.0 ESSENTIAL TREMOR: ICD-10-CM

## 2020-08-27 DIAGNOSIS — E78.2 MIXED HYPERLIPIDEMIA: ICD-10-CM

## 2020-08-27 DIAGNOSIS — R63.4 WEIGHT LOSS: ICD-10-CM

## 2020-08-27 DIAGNOSIS — Z00.00 MEDICARE ANNUAL WELLNESS VISIT, SUBSEQUENT: Primary | ICD-10-CM

## 2020-08-27 DIAGNOSIS — R10.32 LLQ ABDOMINAL PAIN: ICD-10-CM

## 2020-08-27 PROCEDURE — 99214 OFFICE O/P EST MOD 30 MIN: CPT | Performed by: INTERNAL MEDICINE

## 2020-08-27 PROCEDURE — G0439 PPPS, SUBSEQ VISIT: HCPCS | Performed by: INTERNAL MEDICINE

## 2020-08-27 PROCEDURE — 90662 IIV NO PRSV INCREASED AG IM: CPT

## 2020-08-27 PROCEDURE — 1160F RVW MEDS BY RX/DR IN RCRD: CPT | Performed by: INTERNAL MEDICINE

## 2020-08-27 PROCEDURE — 3008F BODY MASS INDEX DOCD: CPT | Performed by: INTERNAL MEDICINE

## 2020-08-27 PROCEDURE — 4040F PNEUMOC VAC/ADMIN/RCVD: CPT | Performed by: INTERNAL MEDICINE

## 2020-08-27 PROCEDURE — 1170F FXNL STATUS ASSESSED: CPT | Performed by: INTERNAL MEDICINE

## 2020-08-27 PROCEDURE — 1036F TOBACCO NON-USER: CPT | Performed by: INTERNAL MEDICINE

## 2020-08-27 PROCEDURE — G0008 ADMIN INFLUENZA VIRUS VAC: HCPCS

## 2020-08-27 PROCEDURE — 3725F SCREEN DEPRESSION PERFORMED: CPT | Performed by: INTERNAL MEDICINE

## 2020-08-27 PROCEDURE — 1125F AMNT PAIN NOTED PAIN PRSNT: CPT | Performed by: INTERNAL MEDICINE

## 2020-08-27 RX ORDER — LEVOTHYROXINE SODIUM 0.05 MG/1
50 TABLET ORAL EVERY MORNING
Qty: 90 TABLET | Refills: 3 | Status: SHIPPED | OUTPATIENT
Start: 2020-08-27 | End: 2021-09-04 | Stop reason: SDUPTHER

## 2020-08-27 RX ORDER — ATORVASTATIN CALCIUM 10 MG/1
10 TABLET, FILM COATED ORAL EVERY MORNING
Qty: 90 TABLET | Refills: 3 | Status: SHIPPED | OUTPATIENT
Start: 2020-08-27 | End: 2021-10-22 | Stop reason: SDUPTHER

## 2020-08-27 NOTE — ASSESSMENT & PLAN NOTE
She continues on atorvastatin and we reviewed her labs; these are within goal   Continue healthy diet and exercise

## 2020-08-27 NOTE — PROGRESS NOTES
Assessment and Plan:     Problem List Items Addressed This Visit     None           Preventive health issues were discussed with patient, and age appropriate screening tests were ordered as noted in patient's After Visit Summary  Personalized health advice and appropriate referrals for health education or preventive services given if needed, as noted in patient's After Visit Summary       History of Present Illness:     Patient presents for Medicare Annual Wellness visit    Patient Care Team:  Pauline Kawasaki, MD as PCP - General  Mary Anne Cheng MD     Problem List:     Patient Active Problem List   Diagnosis    Essential tremor    Colon polyp    Hypothyroidism    Osteoporosis    Mixed hyperlipidemia    Cervical dystonia    Elevated glucose    Elevated serum creatinine      Past Medical and Surgical History:     Past Medical History:   Diagnosis Date    Colon polyp     Diarrhea     Disease of thyroid gland     hypo    Dizziness     Essential tremor     hands and head    Glaucoma     both eyes    Osteoporosis     Persistent dry cough     Pre-diabetes     Stomach cramps     Wears glasses     Wears hearing aid     darien  ears     Past Surgical History:   Procedure Laterality Date    CATARACT EXTRACTION Bilateral     COLONOSCOPY        Family History:     Family History   Problem Relation Age of Onset    No Known Problems Mother     Diabetes Brother         pre-diabetes    Cancer Father         throat cancer-    Diabetes Sister     Diabetes Brother         DM    Cancer Maternal Grandmother         breast    Diabetes Paternal Grandmother     Arthritis Brother     Cancer Brother     Dystonia Neg Hx     Tremor Neg Hx       Social History:        Social History     Socioeconomic History    Marital status: Single     Spouse name: None    Number of children: None    Years of education: None    Highest education level: None   Occupational History    None   Social Needs    Financial resource strain: None    Food insecurity     Worry: None     Inability: None    Transportation needs     Medical: None     Non-medical: None   Tobacco Use    Smoking status: Former Smoker     Last attempt to quit: 1980     Years since quittin 6    Smokeless tobacco: Never Used   Substance and Sexual Activity    Alcohol use:  Yes     Alcohol/week: 14 0 standard drinks     Types: 14 Cans of beer per week     Frequency: 4 or more times a week     Drinks per session: 1 or 2     Comment: daily beer    Drug use: No    Sexual activity: None   Lifestyle    Physical activity     Days per week: None     Minutes per session: None    Stress: None   Relationships    Social connections     Talks on phone: None     Gets together: None     Attends Mor[employer] service: None     Active member of club or organization: None     Attends meetings of clubs or organizations: None     Relationship status: None    Intimate partner violence     Fear of current or ex partner: None     Emotionally abused: None     Physically abused: None     Forced sexual activity: None   Other Topics Concern    None   Social History Narrative    None      Medications and Allergies:     Current Outpatient Medications   Medication Sig Dispense Refill    atorvastatin (LIPITOR) 10 mg tablet Take 1 tablet (10 mg total) by mouth daily (Patient taking differently: Take 10 mg by mouth every morning ) 90 tablet 3    Botox 100 units INJECT UP  UNITS  INTRAMUSCULARLY EVERY 3  MONTHS (GIVEN AT MD OFFICE, DISCARD UNUSED) (Patient taking differently: Cervical Dystonia) 2 each 3    brinzolamide (AZOPT) 1 % ophthalmic suspension Administer to the right eye every morning       clonazePAM (KlonoPIN) 0 5 mg tablet Take 1 tablet (0 5 mg total) by mouth daily (Patient taking differently: Take 0 5 mg by mouth every morning ) 90 tablet 3    levothyroxine 50 mcg tablet Take 1 tablet (50 mcg total) by mouth daily (Patient taking differently: Take 50 mcg by mouth every morning ) 90 tablet 3    travoprost (TRAVATAN Z) 0 004 % ophthalmic solution Apply 1 drop to eye daily at bedtime Both eyes      zonisamide (ZONEGRAN) 50 MG capsule Take 1 capsule (50 mg total) by mouth daily at bedtime (Patient taking differently: Take 50 mg by mouth daily at bedtime Essential tremor of the hands and head) 90 capsule 3    polyethylene glycol-electrolytes (NULYTELY) 4000 mL solution once       No current facility-administered medications for this visit  No Known Allergies   Immunizations:     Immunization History   Administered Date(s) Administered    Influenza Split High Dose Preservative Free IM 10/18/2012, 10/03/2013    Influenza TIV (IM) 10/20/2010, 10/26/2011, 09/08/2014, 09/12/2015    Pneumococcal Conjugate 13-Valent 12/16/2015    Pneumococcal Polysaccharide PPV23 07/09/2009    influenza, trivalent, adjuvanted 09/14/2018      Health Maintenance: There are no preventive care reminders to display for this patient  Topic Date Due    DTaP,Tdap,and Td Vaccines (1 - Tdap) 05/07/1964    Influenza Vaccine  07/01/2020      Medicare Health Risk Assessment:     Pulse 93   Temp 97 5 °F (36 4 °C)   Resp 16   Ht 5' 4" (1 626 m)   Wt 46 3 kg (102 lb)   SpO2 97%   BMI 17 51 kg/m²      Irlanda Warner is here for her Subsequent Wellness visit  Health Risk Assessment:   Patient rates overall health as good  Patient feels that their physical health rating is same  Eyesight was rated as same  Hearing was rated as same  Pain experienced in the last 7 days has been a lot  Patient's pain rating has been 7/10  Patient states that she has experienced no weight loss or gain in last 6 months  Depression Screening:   PHQ-2 Score: 0      Fall Risk Screening: In the past year, patient has experienced: no history of falling in past year      Urinary Incontinence Screening:   Patient has not leaked urine accidently in the last six months       Home Safety:  Patient does not have trouble with stairs inside or outside of their home  Patient has working smoke alarms and has working carbon monoxide detector  Home safety hazards include: none  Nutrition:   Current diet is Regular and Limited junk food  Medications:   Patient is not currently taking any over-the-counter supplements  Patient is able to manage medications  Activities of Daily Living (ADLs)/Instrumental Activities of Daily Living (IADLs):   Walk and transfer into and out of bed and chair?: Yes  Dress and groom yourself?: Yes    Bathe or shower yourself?: Yes    Feed yourself?  Yes  Do your laundry/housekeeping?: Yes  Manage your money, pay your bills and track your expenses?: Yes  Make your own meals?: Yes    Do your own shopping?: Yes    ADL comments: Patient is still working     Previous Hospitalizations:   Any hospitalizations or ED visits within the last 12 months?: No      Advance Care Planning:   Living will: Yes    Advanced directive: Yes    End of Life Decisions reviewed with patient: Yes      Cognitive Screening:   Provider or family/friend/caregiver concerned regarding cognition?: No    PREVENTIVE SCREENINGS      Cardiovascular Screening:    General: Screening Not Indicated and History Lipid Disorder      Diabetes Screening:     General: Screening Current      Colorectal Cancer Screening:     General: Screening Current      Breast Cancer Screening:     General: Screening Current      Cervical Cancer Screening:    General: Screening Not Indicated      Osteoporosis Screening:    General: Screening Not Indicated and History Osteoporosis      Abdominal Aortic Aneurysm (AAA) Screening:        General: Patient Declines and Screening Not Indicated      Lung Cancer Screening:     General: Screening Not Indicated      Hepatitis C Screening:    General: Screening Not Indicated    Other Counseling Topics:   Alcohol use counseling, car/seat belt/driving safety, skin self-exam, sunscreen and calcium and vitamin D intake and regular weightbearing exercise         Yina Lopez MD

## 2020-08-27 NOTE — PATIENT INSTRUCTIONS
Medicare Preventive Visit Patient Instructions  Thank you for completing your Welcome to Medicare Visit or Medicare Annual Wellness Visit today  Your next wellness visit will be due in one year (8/27/2021)  The screening/preventive services that you may require over the next 5-10 years are detailed below  Some tests may not apply to you based off risk factors and/or age  Screening tests ordered at today's visit but not completed yet may show as past due  Also, please note that scanned in results may not display below  Preventive Screenings:  Service Recommendations Previous Testing/Comments   Colorectal Cancer Screening  * Colonoscopy    * Fecal Occult Blood Test (FOBT)/Fecal Immunochemical Test (FIT)  * Fecal DNA/Cologuard Test  * Flexible Sigmoidoscopy Age: 54-65 years old   Colonoscopy: every 10 years (may be performed more frequently if at higher risk)  OR  FOBT/FIT: every 1 year  OR  Cologuard: every 3 years  OR  Sigmoidoscopy: every 5 years  Screening may be recommended earlier than age 48 if at higher risk for colorectal cancer  Also, an individualized decision between you and your healthcare provider will decide whether screening between the ages of 74-80 would be appropriate  Colonoscopy: 08/26/2020  FOBT/FIT: Not on file  Cologuard: Not on file  Sigmoidoscopy: Not on file         Breast Cancer Screening Age: 36 years old  Frequency: every 1-2 years  Not required if history of left and right mastectomy Mammogram: 11/08/2018    Screening Current   Cervical Cancer Screening Between the ages of 21-29, pap smear recommended once every 3 years  Between the ages of 33-67, can perform pap smear with HPV co-testing every 5 years     Recommendations may differ for women with a history of total hysterectomy, cervical cancer, or abnormal pap smears in past  Pap Smear: Not on file    Screening Not Indicated   Hepatitis C Screening Once for adults born between 1945 and 1965  More frequently in patients at high risk for Hepatitis C Hep C Antibody: Not on file       Diabetes Screening 1-2 times per year if you're at risk for diabetes or have pre-diabetes Fasting glucose: No results in last 5 years   A1C: 5 9 % of total Hgb    Screening Current   Cholesterol Screening Once every 5 years if you don't have a lipid disorder  May order more often based on risk factors  Lipid panel: 08/19/2020    Screening Not Indicated  History Lipid Disorder     Other Preventive Screenings Covered by Medicare:  1  Abdominal Aortic Aneurysm (AAA) Screening: covered once if your at risk  You're considered to be at risk if you have a family history of AAA  2  Lung Cancer Screening: covers low dose CT scan once per year if you meet all of the following conditions: (1) Age 50-69; (2) No signs or symptoms of lung cancer; (3) Current smoker or have quit smoking within the last 15 years; (4) You have a tobacco smoking history of at least 30 pack years (packs per day multiplied by number of years you smoked); (5) You get a written order from a healthcare provider  3  Glaucoma Screening: covered annually if you're considered high risk: (1) You have diabetes OR (2) Family history of glaucoma OR (3)  aged 48 and older OR (3)  American aged 72 and older  3  Osteoporosis Screening: covered every 2 years if you meet one of the following conditions: (1) You're estrogen deficient and at risk for osteoporosis based off medical history and other findings; (2) Have a vertebral abnormality; (3) On glucocorticoid therapy for more than 3 months; (4) Have primary hyperparathyroidism; (5) On osteoporosis medications and need to assess response to drug therapy  · Last bone density test (DXA Scan): Not on file  5  HIV Screening: covered annually if you're between the age of 12-76  Also covered annually if you are younger than 13 and older than 72 with risk factors for HIV infection   For pregnant patients, it is covered up to 3 times per pregnancy  Immunizations:  Immunization Recommendations   Influenza Vaccine Annual influenza vaccination during flu season is recommended for all persons aged >= 6 months who do not have contraindications   Pneumococcal Vaccine (Prevnar and Pneumovax)  * Prevnar = PCV13  * Pneumovax = PPSV23   Adults 25-60 years old: 1-3 doses may be recommended based on certain risk factors  Adults 72 years old: Prevnar (PCV13) vaccine recommended followed by Pneumovax (PPSV23) vaccine  If already received PPSV23 since turning 65, then PCV13 recommended at least one year after PPSV23 dose  Hepatitis B Vaccine 3 dose series if at intermediate or high risk (ex: diabetes, end stage renal disease, liver disease)   Tetanus (Td) Vaccine - COST NOT COVERED BY MEDICARE PART B Following completion of primary series, a booster dose should be given every 10 years to maintain immunity against tetanus  Td may also be given as tetanus wound prophylaxis  Tdap Vaccine - COST NOT COVERED BY MEDICARE PART B Recommended at least once for all adults  For pregnant patients, recommended with each pregnancy  Shingles Vaccine (Shingrix) - COST NOT COVERED BY MEDICARE PART B  2 shot series recommended in those aged 48 and above     Health Maintenance Due:  There are no preventive care reminders to display for this patient  Immunizations Due:      Topic Date Due    DTaP,Tdap,and Td Vaccines (1 - Tdap) 05/07/1964    Influenza Vaccine  07/01/2020     Advance Directives   What are advance directives? Advance directives are legal documents that state your wishes and plans for medical care  These plans are made ahead of time in case you lose your ability to make decisions for yourself  Advance directives can apply to any medical decision, such as the treatments you want, and if you want to donate organs  What are the types of advance directives? There are many types of advance directives, and each state has rules about how to use them   You may choose a combination of any of the following:  · Living will: This is a written record of the treatment you want  You can also choose which treatments you do not want, which to limit, and which to stop at a certain time  This includes surgery, medicine, IV fluid, and tube feedings  · Durable power of  for healthcare [facility]): This is a written record that states who you want to make healthcare choices for you when you are unable to make them for yourself  This person, called a proxy, is usually a family member or a friend  You may choose more than 1 proxy  · Do not resuscitate (DNR) order:  A DNR order is used in case your heart stops beating or you stop breathing  It is a request not to have certain forms of treatment, such as CPR  A DNR order may be included in other types of advance directives  · Medical directive: This covers the care that you want if you are in a coma, near death, or unable to make decisions for yourself  You can list the treatments you want for each condition  Treatment may include pain medicine, surgery, blood transfusions, dialysis, IV or tube feedings, and a ventilator (breathing machine)  · Values history: This document has questions about your views, beliefs, and how you feel and think about life  This information can help others choose the care that you would choose  Why are advance directives important? An advance directive helps you control your care  Although spoken wishes may be used, it is better to have your wishes written down  Spoken wishes can be misunderstood, or not followed  Treatments may be given even if you do not want them  An advance directive may make it easier for your family to make difficult choices about your care  Underweight  Underweight is defined as having a body mass index (BMI) of less than 18 5 kg/m2   Anorexia  is a loss of appetite, decreased food intake, or both  Your appetite naturally decreases as you get older   You also get full faster than you used to  This occurs because your body needs less energy  Other body changes can also lead to a decreased appetite  Even though some appetite loss is normal, you still need to get enough calories and nutrients to keep you healthy  You can start to lose too much weight if you do not eat as much food as your body needs  Unwanted weight loss can cause health problems, or worsen health problems you already have  You can also become dehydrated if you do not drink enough liquid  How to eat healthy and get enough nutrients:   · Choose healthy foods  Eat a variety of fruits, vegetables, whole grains, low-fat dairy foods, lean meats, and other protein foods  Limit foods high in fat, sugar, and salt  Limit or avoid alcohol as directed  Work with a dietitian to help you plan your meals if you need to follow a special diet  A dietitian can also teach you how to modify foods if you have trouble chewing or swallowing  · Snack on healthy foods between meals  if you only eat a small amount during meals  Snacks provide extra healthy nutrients and calories between meals  Examples include fruit, cheese, and whole grain crackers  · Drink liquids as directed  to avoid dehydration  Drink liquids between meals if they cause you to get full too quickly during meals  Ask how much liquid to drink each day and which liquids are best for you  · Use herbs, spices, and flavor enhancers to add flavor to foods  Avoid using herbs and spice blends that also contain sodium  Ask your healthcare provider or dietitian about flavor enhancers  Flavor enhancers with ham, natural fitzpatrick, and roast beef flavors can also be sprinkled on food to add flavor  · Share meals with others as often as you can  Eating with others may help you to eat better during meal time  Ask family members, neighbors, or friends to join you for lunch  There are also senior centers where you can meet people, and share meals with them     · Ask family and friends for help  with shopping or preparing foods  Ask for a ride to the grocery store, if needed  © Copyright [employer] 2018 Information is for End User's use only and may not be sold, redistributed or otherwise used for commercial purposes   All illustrations and images included in CareNotes® are the copyrighted property of A D A M , Inc  or [address] [place] [address]

## 2020-09-02 ENCOUNTER — HOSPITAL ENCOUNTER (OUTPATIENT)
Dept: RADIOLOGY | Facility: HOSPITAL | Age: 75
Discharge: HOME | End: 2020-09-02
Attending: INTERNAL MEDICINE
Payer: COMMERCIAL

## 2020-09-02 ENCOUNTER — TELEPHONE (OUTPATIENT)
Dept: NEUROLOGY | Facility: CLINIC | Age: 75
End: 2020-09-02

## 2020-09-02 DIAGNOSIS — R10.32 LLQ ABDOMINAL PAIN: ICD-10-CM

## 2020-09-02 DIAGNOSIS — R63.4 WEIGHT LOSS: ICD-10-CM

## 2020-09-02 PROCEDURE — 74177 CT ABD & PELVIS W/CONTRAST: CPT

## 2020-09-02 PROCEDURE — G1004 CDSM NDSC: HCPCS

## 2020-09-02 RX ADMIN — IOHEXOL 100 ML: 350 INJECTION, SOLUTION INTRAVENOUS at 10:01

## 2020-09-02 NOTE — TELEPHONE ENCOUNTER
Patient is scheduled with Dr Felix Johnson on 10/13/2020 in the [place] location  *Please call in a script for Botox 100 units QTY: 1 with no refills   Patient has an extra 100 unit vial in the [employer]  - [facility]- will use this to complete the dosage of 200 units*

## 2020-09-02 NOTE — TELEPHONE ENCOUNTER
Type  Date  User  Summary  Attachment    General  09/02/2020  7:46 AM  Amy Silvestre  care coordination  -    Note     Botox- no authorization needed   Please use [address] [facility]      Thank you,     Mary Lou Winters

## 2020-09-02 NOTE — TELEPHONE ENCOUNTER
Called [facility] and spoke to Jeana - I informed her that I needed to call in a new prescription for the patient  I was transferred to the pharmacy where I spoke to [place]  - provided him with verbal order for Botox 100 Units Qty of 1  with 0 refills

## 2020-09-03 NOTE — TELEPHONE ENCOUNTER
[facility] - she informed me that prior Pol[employer] Sina is needed for pt's Botox  Please call [phone] for prior auth

## 2020-09-04 NOTE — TELEPHONE ENCOUNTER
Prior authorization submitted to [facility] via Cover my meds on 9/4/2020 for Botox 100 units QTY: 1  Will await approval/denial letter      Pending authorization #: [id number]

## 2020-09-04 NOTE — TELEPHONE ENCOUNTER
Received approval information for Botox 100 unit vial Qty: 1 (one time use) from [facility] via cover my meds  Approval letter scanned into the patient's chart under "media" for future reference      Authorization information:    Authorization #: [id number]  Valid dates: 9/4/2020 until 12/4/2020

## 2020-09-08 NOTE — TELEPHONE ENCOUNTER
[address]- spoke with [facility]- she states the patient's Botox order is ready to be scheduled for delivery  Patient's consent is on file  Botox to be delivered on Thursday 9/10/2020 to the [facility] location via [employer] priority overnight- a signature will be required  Kristen Rosales,    Please await the patient's Botox order and document once it has arrived  Please let me know if you do not receive the patient's Botox order      Thank you,    Laverne Mcnulty

## 2020-09-10 NOTE — TELEPHONE ENCOUNTER
Botox number of units: 100  Botox quantity: 1  Arrived at what location: [place]  Lot number: [id number]  Expiration Date: 4/23  NDC:  [id number]

## 2020-10-13 ENCOUNTER — PROCEDURE VISIT (OUTPATIENT)
Dept: NEUROLOGY | Facility: CLINIC | Age: 75
End: 2020-10-13
Payer: COMMERCIAL

## 2020-10-13 VITALS — SYSTOLIC BLOOD PRESSURE: 124 MMHG | DIASTOLIC BLOOD PRESSURE: 70 MMHG | HEART RATE: 86 BPM | TEMPERATURE: 98.4 F

## 2020-10-13 DIAGNOSIS — G24.3 CERVICAL DYSTONIA: Primary | ICD-10-CM

## 2020-10-13 PROCEDURE — 64616 CHEMODENERV MUSC NECK DYSTON: CPT | Performed by: PSYCHIATRY & NEUROLOGY

## 2020-11-11 DIAGNOSIS — G24.3 CERVICAL DYSTONIA: ICD-10-CM

## 2020-11-11 RX ORDER — ONABOTULINUMTOXINA 100 [USP'U]/1
INJECTION, POWDER, LYOPHILIZED, FOR SOLUTION INTRADERMAL; INTRAMUSCULAR
Qty: 1 EACH | Refills: 11 | Status: SHIPPED | OUTPATIENT
Start: 2020-11-11

## 2020-11-24 ENCOUNTER — TELEPHONE (OUTPATIENT)
Dept: NEUROLOGY | Facility: CLINIC | Age: 75
End: 2020-11-24

## 2020-12-14 ENCOUNTER — TELEPHONE (OUTPATIENT)
Dept: NEUROLOGY | Facility: CLINIC | Age: 75
End: 2020-12-14

## 2021-01-14 DIAGNOSIS — Z23 ENCOUNTER FOR IMMUNIZATION: ICD-10-CM

## 2021-01-19 ENCOUNTER — PROCEDURE VISIT (OUTPATIENT)
Dept: NEUROLOGY | Facility: CLINIC | Age: 76
End: 2021-01-19
Payer: COMMERCIAL

## 2021-01-19 VITALS — SYSTOLIC BLOOD PRESSURE: 122 MMHG | DIASTOLIC BLOOD PRESSURE: 74 MMHG | HEART RATE: 69 BPM | TEMPERATURE: 98 F

## 2021-01-19 DIAGNOSIS — G24.3 CERVICAL DYSTONIA: Primary | ICD-10-CM

## 2021-01-19 PROCEDURE — 64616 CHEMODENERV MUSC NECK DYSTON: CPT | Performed by: PSYCHIATRY & NEUROLOGY

## 2021-01-19 NOTE — PROGRESS NOTES
Mary Shultz returned today to the Botox clinic in the [address] at the [facility]  As you know, the patient is a 66 y o  female who is receiving Botox injections for cervical dystona  The effect of the previous injection was good  Less tremor  Less posturing  Less abnormal movements  Side effects included no           Medications:      Current Outpatient Medications on File Prior to Visit   Medication Sig Dispense Refill    atorvastatin (LIPITOR) 10 mg tablet Take 1 tablet (10 mg total) by mouth every morning 90 tablet 3    brinzolamide (AZOPT) 1 % ophthalmic suspension Administer to the right eye every morning       clonazePAM (KlonoPIN) 0 5 mg tablet Take 1 tablet (0 5 mg total) by mouth daily (Patient taking differently: Take 0 5 mg by mouth every morning ) 90 tablet 3    levothyroxine 50 mcg tablet Take 1 tablet (50 mcg total) by mouth every morning 90 tablet 3    onabotulinumtoxin A (Botox) 100 units Cervical Dystonia 1 each 11    polyethylene glycol-electrolytes (NULYTELY) 4000 mL solution once      travoprost (TRAVATAN Z) 0 004 % ophthalmic solution Apply 1 drop to eye daily at bedtime Both eyes      zonisamide (ZONEGRAN) 50 MG capsule Take 1 capsule (50 mg total) by mouth daily at bedtime (Patient taking differently: Take 50 mg by mouth daily at bedtime Essential tremor of the hands and head) 90 capsule 3     No current facility-administered medications on file prior to visit  Focused Neurologic examination:     Significantly improve tremor and posturing  Left trap remains hypertrophied  Injection details:     After sterile preparation of the skin, a total of 200 units of Botox were injected without EMG guidance  Each 100 units was diluted in 1 cc of PFNS   0 units were discarded as waste        The injections were done as follows into the:  RIGHT Splenious capitis: 30 units x2 (60 units total)   RIGHT levator scapuli : 25 units (higher location)  LEFT Splenious capitis: 50 units   LEFT cervical trapezius: 25 units   LEFT shoulder trapezius: 20 units x2 (increase) (one at the transition between cervical and shoulder)      The procedure was well tolerated without immediate complications  The patient will return in 12 weeks for further evaluation and treatment

## 2021-02-05 ENCOUNTER — TELEMEDICINE (OUTPATIENT)
Dept: FAMILY MEDICINE CLINIC | Facility: CLINIC | Age: 76
End: 2021-02-05
Payer: COMMERCIAL

## 2021-02-05 DIAGNOSIS — B34.9 VIRAL INFECTION, UNSPECIFIED: Primary | ICD-10-CM

## 2021-02-05 DIAGNOSIS — B34.9 VIRAL INFECTION, UNSPECIFIED: ICD-10-CM

## 2021-02-05 PROCEDURE — 99213 OFFICE O/P EST LOW 20 MIN: CPT | Performed by: NURSE PRACTITIONER

## 2021-02-05 PROCEDURE — U0003 INFECTIOUS AGENT DETECTION BY NUCLEIC ACID (DNA OR RNA); SEVERE ACUTE RESPIRATORY SYNDROME CORONAVIRUS 2 (SARS-COV-2) (CORONAVIRUS DISEASE [COVID-19]), AMPLIFIED PROBE TECHNIQUE, MAKING USE OF HIGH THROUGHPUT TECHNOLOGIES AS DESCRIBED BY CMS-2020-01-R: HCPCS | Performed by: NURSE PRACTITIONER

## 2021-02-05 PROCEDURE — U0005 INFEC AGEN DETEC AMPLI PROBE: HCPCS | Performed by: NURSE PRACTITIONER

## 2021-02-05 NOTE — PROGRESS NOTES
COVID-19 Virtual Visit     Assessment/Plan:    Problem List Items Addressed This Visit     None      Visit Diagnoses     Viral infection, unspecified    -  Primary    Relevant Orders    Novel Coronavirus (Covid-19),PCR [facility] - Collected at Mobile Vans or [facility]         Disposition:     I referred patient to one of our centralized sites for a COVID-19 swab  Pt to quarantine until results are available    I have spent 8 minutes directly with the patient  Encounter provider KINDRA Adamson    Provider located at [place] [address]  [address] [place] [id number]    Recent Visits  No visits were found meeting these conditions  Showing recent visits within past 7 days and meeting all other requirements     Today's Visits  Date Type Provider Dept   02/05/21 Telemedicine Justino Adamson today's visits and meeting all other requirements     Future Appointments  No visits were found meeting these conditions  Showing future appointments within next 150 days and meeting all other requirements        Patient agrees to participate in a virtual check in via telephone or video visit instead of presenting to the office to address urgent/immediate medical needs  Patient is aware this is a billable service  After connecting through Telephone, the patient was identified by name and date of birth  Ignacia Monae was informed that this was a telemedicine visit and that the exam was being conducted confidentially over secure lines  My office door was closed  No one else was in the room  Ignacia Monae acknowledged consent and understanding of privacy and security of the telemedicine visit  I informed the patient that I have reviewed her record in Epic and presented the opportunity for her to ask any questions regarding the visit today  The patient agreed to participate      It was my intent to perform this visit via video technology but the patient was not able to do a video connection so the visit was completed via audio telephone only  Subjective:   Angela Maza is a 66 y o  female who is concerned about COVID-19  Patient's symptoms include cough (dry) and diarrhea  Patient denies fever, chills, fatigue, malaise, congestion, rhinorrhea, sore throat, anosmia, loss of taste, shortness of breath, chest tightness, abdominal pain, nausea, vomiting, myalgias and headaches  Date of symptom onset: 2/4/2021    Exposure:   Contact with a person who is under investigation (PUI) for or who is positive for COVID-19 within the last 14 days?: No    Hospitalized recently for fever and/or lower respiratory symptoms?: No      Currently a healthcare worker that is involved in direct patient care?: No      Works in a special setting where the risk of COVID-19 transmission may be high? (this may include long-term care, correctional and penitentiary facilities; homeless shelters; assisted-living facilities and group homes ): No      Resident in a special setting where the risk of COVID-19 transmission may be high? (this may include long-term care, correctional and penitentiary facilities; homeless shelters; assisted-living facilities and group homes ): No      She developed a dry cough yesterday  She has a chronic cough secondary to acid reflux,but now feels hoarse  She had diarrhea overnight      Lab Results   Component Value Date    SARSCOV2 Not Detected 08/20/2020     Past Medical History:   Diagnosis Date    Colon polyp     Diarrhea     Disease of thyroid gland     hypo    Dizziness     Essential tremor     hands and head    Glaucoma     both eyes    Osteoporosis     Persistent dry cough     Pre-diabetes     Stomach cramps     Wears glasses     Wears hearing aid     darien  ears     Past Surgical History:   Procedure Laterality Date    CATARACT EXTRACTION Bilateral     COLONOSCOPY       Current Outpatient Medications Medication Sig Dispense Refill    atorvastatin (LIPITOR) 10 mg tablet Take 1 tablet (10 mg total) by mouth every morning 90 tablet 3    brinzolamide (AZOPT) 1 % ophthalmic suspension Administer to the right eye every morning       clonazePAM (KlonoPIN) 0 5 mg tablet Take 1 tablet (0 5 mg total) by mouth daily (Patient taking differently: Take 0 5 mg by mouth every morning ) 90 tablet 3    levothyroxine 50 mcg tablet Take 1 tablet (50 mcg total) by mouth every morning 90 tablet 3    onabotulinumtoxin A (Botox) 100 units Cervical Dystonia 1 each 11    polyethylene glycol-electrolytes (NULYTELY) 4000 mL solution once      travoprost (TRAVATAN Z) 0 004 % ophthalmic solution Apply 1 drop to eye daily at bedtime Both eyes      zonisamide (ZONEGRAN) 50 MG capsule Take 1 capsule (50 mg total) by mouth daily at bedtime (Patient taking differently: Take 50 mg by mouth daily at bedtime Essential tremor of the hands and head) 90 capsule 3     Current Facility-Administered Medications   Medication Dose Route Frequency Provider Last Rate Last Admin    Botulinum Toxin Type A SOLR 200 Units  200 Units Injection Q3 Months Jenna Sexton MD   200 Units at 01/19/21 1526     No Known Allergies    Review of Systems   Constitutional: Negative for chills, fatigue and fever  HENT: Negative for congestion, rhinorrhea and sore throat  Respiratory: Positive for cough (dry)  Negative for chest tightness and shortness of breath  Gastrointestinal: Positive for diarrhea  Negative for abdominal pain, nausea and vomiting  Musculoskeletal: Negative for myalgias  Neurological: Negative for headaches  Objective: There were no vitals filed for this visit  Physical Exam  VIRTUAL VISIT DISCLAIMER    Mat Yari acknowledges that she has consented to an online visit or consultation   She understands that the online visit is based solely on information provided by her, and that, in the absence of a face-to-face physical evaluation by the physician, the diagnosis she receives is both limited and provisional in terms of accuracy and completeness  This is not intended to replace a full medical face-to-face evaluation by the physician  Oseas Preciado understands and accepts these terms

## 2021-02-06 LAB — SARS-COV-2 RNA RESP QL NAA+PROBE: POSITIVE

## 2021-02-08 ENCOUNTER — TELEPHONE (OUTPATIENT)
Dept: FAMILY MEDICINE CLINIC | Facility: CLINIC | Age: 76
End: 2021-02-08

## 2021-02-08 ENCOUNTER — TELEMEDICINE (OUTPATIENT)
Dept: FAMILY MEDICINE CLINIC | Facility: CLINIC | Age: 76
End: 2021-02-08
Payer: COMMERCIAL

## 2021-02-08 DIAGNOSIS — U07.1 COVID-19: Primary | ICD-10-CM

## 2021-02-08 PROCEDURE — 99213 OFFICE O/P EST LOW 20 MIN: CPT | Performed by: NURSE PRACTITIONER

## 2021-02-08 RX ORDER — ACETAMINOPHEN 325 MG/1
650 TABLET ORAL ONCE AS NEEDED
Status: CANCELLED | OUTPATIENT
Start: 2021-02-09

## 2021-02-08 RX ORDER — SODIUM CHLORIDE 9 MG/ML
20 INJECTION, SOLUTION INTRAVENOUS ONCE
Status: CANCELLED | OUTPATIENT
Start: 2021-02-09

## 2021-02-08 RX ORDER — ALBUTEROL SULFATE 90 UG/1
3 AEROSOL, METERED RESPIRATORY (INHALATION) ONCE AS NEEDED
Status: CANCELLED | OUTPATIENT
Start: 2021-02-09

## 2021-02-08 NOTE — PROGRESS NOTES
COVID-19 Virtual Visit     Assessment/Plan:    Problem List Items Addressed This Visit        Other    COVID-19 - Primary         Disposition:     Pt sounds sluggish on the phone  Does not have any conversational dyspnea, but seems slightly slow to respond  Pt reports she does not have any family members who can check on her  I did call 911 to have EMS evaluate her and pt was agreeable and appreciative of this  Will follow up accordingly  ADDENDUM:  Pt was evaluated by EMS and vitals were stable  She has now lost her sense of taste and smell  She would like to be set up for monoclonal antibodies  Pt arranged for tomorrow at noon  Patient is a candidate for Bamlanivimab  They were counseled in regards to risks, benefits, and side effects of this infusion  Possible side effects of bamlanivimab are: Allergic reactions   Allergic reactions can happen during and after infusion with bamlanivimab which include:    Fever, chills, nausea, headache, shortness of breath, low blood pressure, wheezing, swelling of your lips, face, or throat, rash including hives, itching, muscle aches, and dizziness  The side effects of getting any medicine by vein may include brief pain, bleeding, bruising of the skin, soreness, swelling, and possible infection at the infusion site  These are not all the possible side effects of bamlanivimab  Not a lot of people have been given bamlanivimab  Serious and unexpected side effects may happen  Sharmin Bunch is still being studied so it is possible that all of the risks are not known at this time  Please note that this drug was approved under the Emergency Use Authorization of the FDA and has not gone through the full, formal FDA approval process    It is possible that bamlanivimab could interfere with your body's own ability to fight off a future infection of SARS-CoV-2  Similarly, bamlanivimab may reduce your bodys immune response to a vaccine for SARS-CoV-2   Specific studies have not been conducted to address these possible risks  Currently there is no data or safety or efficacy of COVID-19 vaccination in persons who received monoclonal antibodies as part of COVID-19 treatment  Treatment should be deferred for at least 90 days to avoid interference of the treatment with vaccine-induced immune responses (this is based on estimated half-life of therapies and evidence suggesting reinfection is uncommon within 90 days of initial infection)  The patient consents to proceed with bamlanivimab infusion  *[email or web address]  [email or web address]  pdf    I have spent 9 minutes directly with the patient  Encounter provider KINDRA Terrell    Provider located at  [address] [address]    Recent Visits  Date Type Provider Dept   02/05/21 Telemedicine Justino Terrell recent visits within past 7 days and meeting all other requirements     Today's Visits  Date Type Provider Dept   02/08/21 Telephone Dayna 32 [facility]   02/08/21 Telemedicine Justino Terrell today's visits and meeting all other requirements     Future Appointments  No visits were found meeting these conditions  Showing future appointments within next 150 days and meeting all other requirements        Patient agrees to participate in a virtual check in via telephone or video visit instead of presenting to the office to address urgent/immediate medical needs  Patient is aware this is a billable service  After connecting through [facility], the patient was identified by name and date of birth  Luci Melton was informed that this was a telemedicine visit and that the exam was being conducted confidentially over secure lines  My office door was closed  No one else was in the room   Luci Melton acknowledged consent and understanding of privacy and security of the telemedicine visit  I informed the patient that I have reviewed her record in Epic and presented the opportunity for her to ask any questions regarding the visit today  The patient agreed to participate  Subjective:   Jose Greenberg is a 66 y o  female who has been screened for COVID-19  Patient's symptoms include fatigue, malaise, nasal congestion and headache  Patient denies fever, chills, rhinorrhea, sore throat, anosmia, loss of taste, cough, shortness of breath, chest tightness, abdominal pain, nausea, vomiting, diarrhea and myalgias  Sammy Collins has been staying home and has isolated themselves in her home  She is taking care to not share personal items and She is wearing a mask when she leaves her room  Date of symptom onset: 2/4/2021  Date of positive COVID-19 PCR: 2/5/2021    Spoke w/ pt regarding positive COVID test   She feels like she has a bad cold  She has not yet been out of bed  She states she has no way of getting anywhere to be evaluated, as she lives alone and her car is covered in snow  She  Denies any SOB, chest pain, or difficulty breathing        Lab Results   Component Value Date    SARSCOV2 Positive (A) 02/05/2021    SARSCOV2 Not Detected 08/20/2020     Past Medical History:   Diagnosis Date    Colon polyp     Diarrhea     Disease of thyroid gland     hypo    Dizziness     Essential tremor     hands and head    Glaucoma     both eyes    Osteoporosis     Persistent dry cough     Pre-diabetes     Stomach cramps     Wears glasses     Wears hearing aid     darien  ears     Past Surgical History:   Procedure Laterality Date    CATARACT EXTRACTION Bilateral     COLONOSCOPY       Current Outpatient Medications   Medication Sig Dispense Refill    atorvastatin (LIPITOR) 10 mg tablet Take 1 tablet (10 mg total) by mouth every morning 90 tablet 3    brinzolamide (AZOPT) 1 % ophthalmic suspension Administer to the right eye every morning       clonazePAM (KlonoPIN) 0 5 mg tablet Take 1 tablet (0 5 mg total) by mouth daily (Patient taking differently: Take 0 5 mg by mouth every morning ) 90 tablet 3    levothyroxine 50 mcg tablet Take 1 tablet (50 mcg total) by mouth every morning 90 tablet 3    onabotulinumtoxin A (Botox) 100 units Cervical Dystonia 1 each 11    polyethylene glycol-electrolytes (NULYTELY) 4000 mL solution once      travoprost (TRAVATAN Z) 0 004 % ophthalmic solution Apply 1 drop to eye daily at bedtime Both eyes      zonisamide (ZONEGRAN) 50 MG capsule Take 1 capsule (50 mg total) by mouth daily at bedtime (Patient taking differently: Take 50 mg by mouth daily at bedtime Essential tremor of the hands and head) 90 capsule 3     Current Facility-Administered Medications   Medication Dose Route Frequency Provider Last Rate Last Admin    Botulinum Toxin Type A SOLR 200 Units  200 Units Injection Q3 Months Erma Blake MD   200 Units at 01/19/21 1526     No Known Allergies    Review of Systems   Constitutional: Positive for fatigue  Negative for chills and fever  HENT: Positive for congestion  Negative for rhinorrhea and sore throat  Respiratory: Negative for cough, chest tightness and shortness of breath  Gastrointestinal: Negative for abdominal pain, diarrhea, nausea and vomiting  Musculoskeletal: Negative for myalgias  Neurological: Positive for headaches  Objective: There were no vitals filed for this visit  Physical Exam  VIRTUAL VISIT DISCLAIMER    Porter Carrizales acknowledges that she has consented to an online visit or consultation  She understands that the online visit is based solely on information provided by her, and that, in the absence of a face-to-face physical evaluation by the physician, the diagnosis she receives is both limited and provisional in terms of accuracy and completeness  This is not intended to replace a full medical face-to-face evaluation by the physician   Porter Carrizales understands and accepts these terms

## 2021-02-08 NOTE — TELEPHONE ENCOUNTER
OLIVA     Patient called and changed her mind about the infusion  She would like to have it now  Please call back

## 2021-02-08 NOTE — TELEPHONE ENCOUNTER
Pt scheduled for infusion tomorrow and pt aware  Clerical, please schedule virtual visit for Wednesday for COVID f/u, no need to contact pt  She is aware I will be calling   Lyndsay Hatch

## 2021-02-09 ENCOUNTER — HOSPITAL ENCOUNTER (OUTPATIENT)
Dept: INFUSION CENTER | Facility: HOSPITAL | Age: 76
Discharge: HOME | End: 2021-02-09
Attending: FAMILY MEDICINE
Payer: COMMERCIAL

## 2021-02-09 VITALS
TEMPERATURE: 99.5 F | SYSTOLIC BLOOD PRESSURE: 130 MMHG | OXYGEN SATURATION: 95 % | DIASTOLIC BLOOD PRESSURE: 67 MMHG | HEART RATE: 77 BPM | RESPIRATION RATE: 18 BRPM

## 2021-02-09 DIAGNOSIS — U07.1 COVID-19: Primary | ICD-10-CM

## 2021-02-09 PROCEDURE — M0239 BAMLANIVIMAB-XXXX INFUSION: HCPCS | Performed by: FAMILY MEDICINE

## 2021-02-09 RX ORDER — ALBUTEROL SULFATE 90 UG/1
3 AEROSOL, METERED RESPIRATORY (INHALATION) ONCE AS NEEDED
Status: DISCONTINUED | OUTPATIENT
Start: 2021-02-09 | End: 2021-02-12 | Stop reason: HOSPADM

## 2021-02-09 RX ORDER — ACETAMINOPHEN 325 MG/1
650 TABLET ORAL ONCE AS NEEDED
Status: CANCELLED | OUTPATIENT
Start: 2021-02-09

## 2021-02-09 RX ORDER — SODIUM CHLORIDE 9 MG/ML
20 INJECTION, SOLUTION INTRAVENOUS ONCE
Status: CANCELLED | OUTPATIENT
Start: 2021-02-09

## 2021-02-09 RX ORDER — ACETAMINOPHEN 325 MG/1
650 TABLET ORAL ONCE AS NEEDED
Status: DISCONTINUED | OUTPATIENT
Start: 2021-02-09 | End: 2021-02-12 | Stop reason: HOSPADM

## 2021-02-09 RX ORDER — FAMOTIDINE 20 MG/1
20 TABLET, FILM COATED ORAL 2 TIMES DAILY
COMMUNITY
End: 2021-03-04 | Stop reason: CLARIF

## 2021-02-09 RX ORDER — SODIUM CHLORIDE 9 MG/ML
20 INJECTION, SOLUTION INTRAVENOUS ONCE
Status: COMPLETED | OUTPATIENT
Start: 2021-02-09 | End: 2021-02-09

## 2021-02-09 RX ORDER — ALBUTEROL SULFATE 90 UG/1
3 AEROSOL, METERED RESPIRATORY (INHALATION) ONCE AS NEEDED
Status: CANCELLED | OUTPATIENT
Start: 2021-02-09

## 2021-02-09 RX ADMIN — SODIUM CHLORIDE 700 MG: 9 INJECTION, SOLUTION INTRAVENOUS at 12:30

## 2021-02-09 RX ADMIN — SODIUM CHLORIDE 20 ML/HR: 9 INJECTION, SOLUTION INTRAVENOUS at 12:05

## 2021-02-09 NOTE — NURSING NOTE
Patient provided EUA and medication teaching  Patient verbalizes understanding of medication and provides verbal consent for infusion

## 2021-02-09 NOTE — NURSING NOTE
Pt finished infusion without s/s of adverse reaction  Pt given paperwork and has video visit set up for tomorrow  No complaints at this time  IV removed

## 2021-02-09 NOTE — PLAN OF CARE
Problem: Potential for Falls  Goal: Patient will remain free of falls  Description: INTERVENTIONS:  - Assess patient frequently for physical needs  -  Identify cognitive and physical deficits and behaviors that affect risk of falls    -  [place] fall precautions as indicated by assessment   - Educate patient/family on patient safety including physical limitations  - Instruct patient to call for assistance with activity based on assessment  - Modify environment to reduce risk of injury  - Consider OT/PT consult to assist with strengthening/mobility  Outcome: Progressing

## 2021-02-10 ENCOUNTER — TELEMEDICINE (OUTPATIENT)
Dept: FAMILY MEDICINE CLINIC | Facility: CLINIC | Age: 76
End: 2021-02-10
Payer: COMMERCIAL

## 2021-02-10 DIAGNOSIS — U07.1 COVID-19 VIRUS INFECTION: Primary | ICD-10-CM

## 2021-02-10 PROCEDURE — 99441 PR PHYS/QHP TELEPHONE EVALUATION 5-10 MIN: CPT | Performed by: NURSE PRACTITIONER

## 2021-02-10 NOTE — PROGRESS NOTES
COVID-19 Virtual Visit     Assessment/Plan:    Problem List Items Addressed This Visit     None      Visit Diagnoses     COVID-19 virus infection    -  Primary         Disposition:     I recommended continued isolation until at least 24 hours have passed since recovery defined as resolution of fever without the use of fever-reducing medications AND improvement in COVID symptoms AND 10 days have passed since onset of symptoms (or 10 days have passed since date of first positive viral diagnostic test for asymptomatic patients)  Advised to quarantine through 2/15/21  Follow up for any worsening symptoms  I have spent 7 minutes directly with the patient  Encounter provider KINDRA Mansfield    Provider located at [address] [address]    Recent Visits  Date Type Provider Dept   02/08/21 Telephone Dayna 32 [facility]   02/08/21 Telemedicine Joel Mansfiled [facility]   02/05/21 Telemedicine Justino Mansfield recent visits within past 7 days and meeting all other requirements     Today's Visits  Date Type Provider Dept   02/10/21 Telemedicine Justino Mansfield today's visits and meeting all other requirements     Future Appointments  No visits were found meeting these conditions  Showing future appointments within next 150 days and meeting all other requirements        Patient agrees to participate in a virtual check in via telephone or video visit instead of presenting to the office to address urgent/immediate medical needs  Patient is aware this is a billable service  After connecting through Telephone, the patient was identified by name and date of birth  Kamilla Castellanos was informed that this was a telemedicine visit and that the exam was being conducted confidentially over secure lines  My office door was closed   No one else was in the room  Marija Magana acknowledged consent and understanding of privacy and security of the telemedicine visit  I informed the patient that I have reviewed her record in Epic and presented the opportunity for her to ask any questions regarding the visit today  The patient agreed to participate  It was my intent to perform this visit via video technology but the patient was not able to do a video connection so the visit was completed via audio telephone only  Subjective:   Marija Magana is a 66 y o  female who has been screened for COVID-19  Symptom change since last report: improving  Patient's symptoms include fatigue, rhinorrhea, anosmia and loss of taste  Patient denies fever, chills, malaise, congestion, sore throat, cough, shortness of breath, chest tightness, abdominal pain, nausea, vomiting, diarrhea, myalgias and headaches  Freddy Pretty has been staying home and has isolated themselves in her home  She is taking care to not share personal items and is cleaning all surfaces that are touched often, like counters, tabletops, and doorknobs using household cleaning sprays or wipes  She is wearing a mask when she leaves her room  Date of symptom onset: 2/4/2021  Date of positive COVID-19 PCR: 2/5/2021    Monoclonal Antibody Follow-up Symptom Questionnaire  I feel overall: somewhat better  My fever is: same    Following up on COVID 19 infection  She had monoclonal antibody therapy yesterday, which she tolerated well  Overall, feels better  She has loss of sense of taste and smell  Denies any cough, SOB, or fevers      Lab Results   Component Value Date    SARSCOV2 Positive (A) 02/05/2021    SARSCOV2 Not Detected 08/20/2020     Past Medical History:   Diagnosis Date    Colon polyp     Diarrhea     Disease of thyroid gland     hypo    Dizziness     Essential tremor     hands and head    Glaucoma     both eyes    Osteoporosis     Persistent dry cough     Pre-diabetes  Stomach cramps     Wears glasses     Wears hearing aid     darien  ears     Past Surgical History:   Procedure Laterality Date    CATARACT EXTRACTION Bilateral     COLONOSCOPY       Current Outpatient Medications   Medication Sig Dispense Refill    atorvastatin (LIPITOR) 10 mg tablet Take 1 tablet (10 mg total) by mouth every morning 90 tablet 3    brinzolamide (AZOPT) 1 % ophthalmic suspension Administer to the right eye every morning       clonazePAM (KlonoPIN) 0 5 mg tablet Take 1 tablet (0 5 mg total) by mouth daily (Patient taking differently: Take 0 5 mg by mouth every morning ) 90 tablet 3    famotidine (PEPCID) 20 mg tablet Take 20 mg by mouth 2 (two) times a day      levothyroxine 50 mcg tablet Take 1 tablet (50 mcg total) by mouth every morning 90 tablet 3    onabotulinumtoxin A (Botox) 100 units Cervical Dystonia 1 each 11    travoprost (TRAVATAN Z) 0 004 % ophthalmic solution Apply 1 drop to eye daily at bedtime Both eyes      zonisamide (ZONEGRAN) 50 MG capsule Take 1 capsule (50 mg total) by mouth daily at bedtime (Patient taking differently: Take 50 mg by mouth daily at bedtime Essential tremor of the hands and head) 90 capsule 3     Current Facility-Administered Medications   Medication Dose Route Frequency Provider Last Rate Last Admin    Botulinum Toxin Type A SOLR 200 Units  200 Units Injection Q3 Months Artemio Hou MD   200 Units at 01/19/21 1526     Facility-Administered Medications Ordered in Other Visits   Medication Dose Route Frequency Provider Last Rate Last Admin    acetaminophen (TYLENOL) tablet 650 mg  650 mg Oral Once PRN Noble Tsai, DO        albuterol (PROVENTIL HFA,VENTOLIN HFA) inhaler 3 puff  3 puff Inhalation Once PRN Noble Tsai, DO         No Known Allergies    Review of Systems   Constitutional: Positive for fatigue  Negative for chills and fever  HENT: Positive for rhinorrhea  Negative for congestion and sore throat      Respiratory: Negative for cough, chest tightness and shortness of breath  Gastrointestinal: Negative for abdominal pain, diarrhea, nausea and vomiting  Musculoskeletal: Negative for myalgias  Neurological: Negative for headaches  Objective: There were no vitals filed for this visit  Physical Exam  VIRTUAL VISIT DISCLAIMER    Jolanta Cowart acknowledges that she has consented to an online visit or consultation  She understands that the online visit is based solely on information provided by her, and that, in the absence of a face-to-face physical evaluation by the physician, the diagnosis she receives is both limited and provisional in terms of accuracy and completeness  This is not intended to replace a full medical face-to-face evaluation by the physician  Moyduong Sofya understands and accepts these terms

## 2021-02-27 LAB
ALBUMIN SERPL-MCNC: 4.4 G/DL (ref 3.6–5.1)
ALBUMIN/GLOB SERPL: 1.5 (CALC) (ref 1–2.5)
ALP SERPL-CCNC: 89 U/L (ref 37–153)
ALT SERPL-CCNC: 14 U/L (ref 6–29)
AST SERPL-CCNC: 16 U/L (ref 10–35)
BASOPHILS # BLD AUTO: 79 CELLS/UL (ref 0–200)
BASOPHILS NFR BLD AUTO: 0.9 %
BILIRUB SERPL-MCNC: 0.6 MG/DL (ref 0.2–1.2)
BUN SERPL-MCNC: 16 MG/DL (ref 7–25)
BUN/CREAT SERPL: 13 (CALC) (ref 6–22)
CALCIUM SERPL-MCNC: 9.7 MG/DL (ref 8.6–10.4)
CHLORIDE SERPL-SCNC: 104 MMOL/L (ref 98–110)
CHOLEST SERPL-MCNC: 179 MG/DL
CHOLEST/HDLC SERPL: 2.8 (CALC)
CO2 SERPL-SCNC: 27 MMOL/L (ref 20–32)
CREAT SERPL-MCNC: 1.21 MG/DL (ref 0.6–0.93)
EOSINOPHIL # BLD AUTO: 238 CELLS/UL (ref 15–500)
EOSINOPHIL NFR BLD AUTO: 2.7 %
ERYTHROCYTE [DISTWIDTH] IN BLOOD BY AUTOMATED COUNT: 11.8 % (ref 11–15)
EST. AVERAGE GLUCOSE BLD GHB EST-MCNC: 128 (CALC)
EST. AVERAGE GLUCOSE BLD GHB EST-SCNC: 7.1 (CALC)
GLOBULIN SER CALC-MCNC: 2.9 G/DL (CALC) (ref 1.9–3.7)
GLUCOSE SERPL-MCNC: 105 MG/DL (ref 65–99)
HBA1C MFR BLD: 6.1 % OF TOTAL HGB
HCT VFR BLD AUTO: 36.6 % (ref 35–45)
HDLC SERPL-MCNC: 63 MG/DL
HGB BLD-MCNC: 12.3 G/DL (ref 11.7–15.5)
LDLC SERPL CALC-MCNC: 91 MG/DL (CALC)
LYMPHOCYTES # BLD AUTO: 1901 CELLS/UL (ref 850–3900)
LYMPHOCYTES NFR BLD AUTO: 21.6 %
MCH RBC QN AUTO: 30.6 PG (ref 27–33)
MCHC RBC AUTO-ENTMCNC: 33.6 G/DL (ref 32–36)
MCV RBC AUTO: 91 FL (ref 80–100)
MONOCYTES # BLD AUTO: 810 CELLS/UL (ref 200–950)
MONOCYTES NFR BLD AUTO: 9.2 %
NEUTROPHILS # BLD AUTO: 5773 CELLS/UL (ref 1500–7800)
NEUTROPHILS NFR BLD AUTO: 65.6 %
NONHDLC SERPL-MCNC: 116 MG/DL (CALC)
PLATELET # BLD AUTO: 346 THOUSAND/UL (ref 140–400)
PMV BLD REES-ECKER: 9.8 FL (ref 7.5–12.5)
POTASSIUM SERPL-SCNC: 4.9 MMOL/L (ref 3.5–5.3)
PROT SERPL-MCNC: 7.3 G/DL (ref 6.1–8.1)
RBC # BLD AUTO: 4.02 MILLION/UL (ref 3.8–5.1)
SL AMB EGFR AFRICAN AMERICAN: 50 ML/MIN/1.73M2
SL AMB EGFR NON AFRICAN AMERICAN: 43 ML/MIN/1.73M2
SODIUM SERPL-SCNC: 138 MMOL/L (ref 135–146)
TRIGL SERPL-MCNC: 156 MG/DL
WBC # BLD AUTO: 8.8 THOUSAND/UL (ref 3.8–10.8)

## 2021-03-02 NOTE — PROGRESS NOTES
Answers for HPI/ROS submitted by the patient on 3/4/2021   How would you rate your overall health?: very good  Compared to last year, how is your physical health?: same  Compared to last year, how is your eyesight?: same  Compared to last year, how is your hearing?: same  Compared to last year, how is your emotional/mental health?: same  In the past 7 days, how much pain have you experienced?: none  In the past 6 months, have you lost or gained 10 pounds without trying?: No  One or more falls in the last year: No  In the past 6 months, have you accidentally leaked urine?: No  Do you have trouble with the stairs inside or outside your home?: No  Does your home have working smoke alarms?: Yes  Does your home have a carbon monoxide monitor?: Yes  Which safety hazards (if any) have you experienced in your home? Please select all that apply : none  How would you describe your current diet? Please select all that apply : Low Cholesterol, Low Saturated Fat, Low Carb, Limited junk food  In addition to prescription medications, are you taking any over-the-counter supplements?: Yes  If yes, what supplements are you taking?: OsteoMD - Clinical strength bone support  Can you manage your medications?: Yes  Can you walk and transfer into and out of your bed and chair?: Yes  Can you dress and groom yourself?: Yes  Can you bathe or shower yourself?: Yes  Can you feed yourself?: Yes  Can you do your laundry/ housekeeping?: Yes  Can you manage your money, pay your bills, and track your expenses?: Yes  Can you make your own meals?: Yes  Can you do your own shopping?: Yes  Within the last 12 months, have you had any hospitalizations or Emergency Department visits?: No  Additional Comments: I had the Covid virus on 2/2 but symptoms were mild    Do you have a living will?: Yes  Do you have a Durable POA (Power of ) for healthcare decisions?: Yes  Do you have an Advanced Directive for end of life decisions?: Yes  Assessment/Plan:    1  Hypothyroidism, unspecified type  Assessment & Plan:  TSH is within goal and she will continue levothyroxine at current dose  Orders:  -     TSH, 3rd generation with Free T4 reflex; Future; Expected date: 09/04/2021  -     TSH, 3rd generation with Free T4 reflex    2  Essential tremor  Assessment & Plan: This is stable and she continues to follow with neurology  Continue zonegran and botox injections  3  Mixed hyperlipidemia  Assessment & Plan:  Reviewed labs with patient  This is well controlled on current dose of atorvastatin and she will continue  Encouraged continued physical activity  Orders:  -     CBC and differential; Future; Expected date: 09/04/2021  -     Comprehensive metabolic panel; Future; Expected date: 09/04/2021  -     Lipid panel; Future; Expected date: 09/04/2021  -     CBC and differential  -     Comprehensive metabolic panel  -     Lipid panel    4  Elevated serum creatinine  Assessment & Plan:  Possibly secondary to recent covid infection and she will recheck BMP with hydration in 2 weeks  Orders:  -     Basic metabolic panel; Future; Expected date: 03/25/2021  -     Basic metabolic panel  -     CBC and differential; Future; Expected date: 09/04/2021  -     Comprehensive metabolic panel; Future; Expected date: 09/04/2021  -     Lipid panel; Future; Expected date: 09/04/2021  -     CBC and differential  -     Comprehensive metabolic panel  -     Lipid panel    5  Elevated glucose  -     HEMOGLOBIN A1C W/ EAG ESTIMATION; Future; Expected date: 09/04/2021    6  Adult BMI <19 kg/sq m        BMI Counseling: Body mass index is 18 88 kg/m²  The BMI is below normal  Patient was advised to gain weight  There are no Patient Instructions on file for this visit  Return in about 6 months (around 9/4/2021) for 1/2 hour follow up and AWV  Subjective:      Patient ID: Kamilla Castellanos is a 66 y o  female      Chief Complaint   Patient presents with   Vonnie Mendoza Medicare Wellness Visit     [facility]       Here for follow up visit  She had COVID and got the monoclonal antibody infusion  She is feeling much better  We reviewed her labwork and her creatinine was mildly elevated  She had not been drinking well after her infection  There is no chest pain, dyspnea, cough, fever  She denies side effects with her atorvastatin and is trying to follow a low fat diet and stay active  She continues to see neurologist and is getting botox for her tremors  The following portions of the patient's history were reviewed and updated as appropriate: allergies, current medications, past family history, past medical history, past social history, past surgical history and problem list     Review of Systems   Constitutional: Negative  Respiratory: Negative  Cardiovascular: Negative  Neurological: Positive for tremors           Current Outpatient Medications   Medication Sig Dispense Refill    atorvastatin (LIPITOR) 10 mg tablet Take 1 tablet (10 mg total) by mouth every morning 90 tablet 3    brinzolamide (AZOPT) 1 % ophthalmic suspension Administer to the right eye every morning       clonazePAM (KlonoPIN) 0 5 mg tablet Take 1 tablet (0 5 mg total) by mouth daily (Patient taking differently: Take 0 5 mg by mouth every morning ) 90 tablet 3    levothyroxine 50 mcg tablet Take 1 tablet (50 mcg total) by mouth every morning 90 tablet 3    onabotulinumtoxin A (Botox) 100 units Cervical Dystonia 1 each 11    travoprost (TRAVATAN Z) 0 004 % ophthalmic solution Apply 1 drop to eye daily at bedtime Both eyes      zonisamide (ZONEGRAN) 50 MG capsule Take 1 capsule (50 mg total) by mouth daily at bedtime (Patient taking differently: Take 50 mg by mouth daily at bedtime Essential tremor of the hands and head) 90 capsule 3     Current Facility-Administered Medications   Medication Dose Route Frequency Provider Last Rate Last Admin    Botulinum Toxin Type A SOLR 200 Units  200 Units Injection Q3 Months Payal Verdugo MD   200 Units at 01/19/21 1526       Objective:    /80   Pulse 84   Temp 97 5 °F (36 4 °C)   Resp 16   Ht 5' 4" (1 626 m)   Wt 49 9 kg (110 lb)   SpO2 99%   BMI 18 88 kg/m²        Physical Exam  Constitutional:       Appearance: Normal appearance  She is well-developed  Eyes:      Conjunctiva/sclera: Conjunctivae normal    Neck:      Musculoskeletal: Neck supple  Thyroid: No thyromegaly  Vascular: No JVD  Cardiovascular:      Rate and Rhythm: Normal rate and regular rhythm  Heart sounds: Normal heart sounds  No murmur  No friction rub  No gallop  Pulmonary:      Effort: Pulmonary effort is normal       Breath sounds: Normal breath sounds  No wheezing or rales  Abdominal:      General: Bowel sounds are normal  There is no distension  Palpations: Abdomen is soft  Tenderness: There is no abdominal tenderness  Neurological:      Mental Status: She is alert                  Deandre Stout MD

## 2021-03-04 ENCOUNTER — OFFICE VISIT (OUTPATIENT)
Dept: FAMILY MEDICINE CLINIC | Facility: CLINIC | Age: 76
End: 2021-03-04
Payer: COMMERCIAL

## 2021-03-04 VITALS
OXYGEN SATURATION: 99 % | BODY MASS INDEX: 18.78 KG/M2 | HEIGHT: 64 IN | DIASTOLIC BLOOD PRESSURE: 80 MMHG | WEIGHT: 110 LBS | HEART RATE: 84 BPM | RESPIRATION RATE: 16 BRPM | TEMPERATURE: 97.5 F | SYSTOLIC BLOOD PRESSURE: 130 MMHG

## 2021-03-04 DIAGNOSIS — G25.0 ESSENTIAL TREMOR: ICD-10-CM

## 2021-03-04 DIAGNOSIS — R73.09 ELEVATED GLUCOSE: ICD-10-CM

## 2021-03-04 DIAGNOSIS — E03.9 HYPOTHYROIDISM, UNSPECIFIED TYPE: Primary | ICD-10-CM

## 2021-03-04 DIAGNOSIS — R79.89 ELEVATED SERUM CREATININE: ICD-10-CM

## 2021-03-04 DIAGNOSIS — E78.2 MIXED HYPERLIPIDEMIA: ICD-10-CM

## 2021-03-04 PROBLEM — K21.9 ACID REFLUX: Status: ACTIVE | Noted: 2019-05-01

## 2021-03-04 PROCEDURE — 1160F RVW MEDS BY RX/DR IN RCRD: CPT | Performed by: INTERNAL MEDICINE

## 2021-03-04 PROCEDURE — 99214 OFFICE O/P EST MOD 30 MIN: CPT | Performed by: INTERNAL MEDICINE

## 2021-03-04 PROCEDURE — 3725F SCREEN DEPRESSION PERFORMED: CPT | Performed by: INTERNAL MEDICINE

## 2021-03-04 PROCEDURE — 1036F TOBACCO NON-USER: CPT | Performed by: INTERNAL MEDICINE

## 2021-03-04 NOTE — ASSESSMENT & PLAN NOTE
Reviewed labs with patient  This is well controlled on current dose of atorvastatin and she will continue  Encouraged continued physical activity

## 2021-03-04 NOTE — LETTER
March 4, 2021     Patient: Mary Shultz   YOB: 1945   Date of Visit: 3/4/2021       To Whom it May Concern:    Mary Shultz is under my professional care  She was seen in my office on 3/4/2021  She may return to work on 3/8/21  If you have any questions or concerns, please don't hesitate to call           Sincerely,          Viviane Singleton MD        CC: No Recipients

## 2021-03-05 ENCOUNTER — TELEPHONE (OUTPATIENT)
Dept: NEUROLOGY | Facility: CLINIC | Age: 76
End: 2021-03-05

## 2021-03-05 DIAGNOSIS — G24.3 CERVICAL DYSTONIA: ICD-10-CM

## 2021-03-05 NOTE — TELEPHONE ENCOUNTER
Pt is requesting klonopin refill, 90 day supply  She takes 0 5 mg 1 tab daily  Advised pt that there is avail refills left from previous script  Pt states that pharmacy told her that previous script had   [facility] pharmacy, spoke leo/ Florencio Asher and states that klonopin was last filled on 20, 90 day supply  There are avail refills left, not   It is too soon to fill  Next fill date 3/17/21  Pt made aware  She will f/u w/ [facility] pharmacy               [phone] ok to leave detailed message

## 2021-03-18 DIAGNOSIS — G24.3 CERVICAL DYSTONIA: ICD-10-CM

## 2021-03-24 RX ORDER — CLONAZEPAM 0.5 MG/1
TABLET ORAL
Qty: 90 TABLET | Refills: 0 | Status: SHIPPED | OUTPATIENT
Start: 2021-03-24 | End: 2021-07-01 | Stop reason: SDUPTHER

## 2021-03-25 ENCOUNTER — TELEPHONE (OUTPATIENT)
Dept: NEUROLOGY | Facility: CLINIC | Age: 76
End: 2021-03-25

## 2021-03-25 LAB
BUN SERPL-MCNC: 14 MG/DL (ref 7–25)
BUN/CREAT SERPL: 14 (CALC) (ref 6–22)
CALCIUM SERPL-MCNC: 9.7 MG/DL (ref 8.6–10.4)
CHLORIDE SERPL-SCNC: 99 MMOL/L (ref 98–110)
CO2 SERPL-SCNC: 26 MMOL/L (ref 20–32)
CREAT SERPL-MCNC: 1.03 MG/DL (ref 0.6–0.93)
GLUCOSE SERPL-MCNC: 104 MG/DL (ref 65–99)
POTASSIUM SERPL-SCNC: 4.2 MMOL/L (ref 3.5–5.3)
SL AMB EGFR AFRICAN AMERICAN: 61 ML/MIN/1.73M2
SL AMB EGFR NON AFRICAN AMERICAN: 52 ML/MIN/1.73M2
SODIUM SERPL-SCNC: 134 MMOL/L (ref 135–146)

## 2021-03-25 NOTE — TELEPHONE ENCOUNTER
Shant Plata RN  Mary Kay Garcia; [address], I see this patient is scheduled for follow ups only with Dr Grady Jennings  Maybe this is an oversight that we missed the patient  Would you be able to assist is r/s for them?

## 2021-04-01 ENCOUNTER — OFFICE VISIT (OUTPATIENT)
Dept: FAMILY MEDICINE CLINIC | Facility: CLINIC | Age: 76
End: 2021-04-01
Payer: COMMERCIAL

## 2021-04-01 VITALS
RESPIRATION RATE: 16 BRPM | SYSTOLIC BLOOD PRESSURE: 146 MMHG | HEART RATE: 88 BPM | TEMPERATURE: 97.3 F | DIASTOLIC BLOOD PRESSURE: 70 MMHG | BODY MASS INDEX: 17.93 KG/M2 | HEIGHT: 64 IN | WEIGHT: 105 LBS

## 2021-04-01 DIAGNOSIS — H61.23 BILATERAL IMPACTED CERUMEN: Primary | ICD-10-CM

## 2021-04-01 PROCEDURE — 1036F TOBACCO NON-USER: CPT | Performed by: NURSE PRACTITIONER

## 2021-04-01 PROCEDURE — 99213 OFFICE O/P EST LOW 20 MIN: CPT | Performed by: NURSE PRACTITIONER

## 2021-04-01 PROCEDURE — 69210 REMOVE IMPACTED EAR WAX UNI: CPT | Performed by: NURSE PRACTITIONER

## 2021-04-01 RX ORDER — FAMOTIDINE 40 MG/1
TABLET, FILM COATED ORAL
COMMUNITY
Start: 2021-03-08 | End: 2021-09-10

## 2021-04-01 NOTE — PROGRESS NOTES
Assessment/Plan:    1  Bilateral impacted cerumen    Ear cerumen removal    Date/Time: 4/1/2021 1:33 PM  Performed by: Darla Gowers  Authorized by: KINDRA Loaiza   Universal Protocol:  Consent: Verbal consent obtained  Risks and benefits: risks, benefits and alternatives were discussed  Consent given by: patient      Patient location:  Clinic  Procedure details:     Local anesthetic:  None    Location:  L ear and R ear    Procedure type: irrigation with instrumentation      Instrumentation: curette      Approach:  External  Post-procedure details:     Complication:  None    Hearing quality:  Normal    Patient tolerance of procedure: Tolerated well, no immediate complications  Comments: Tolerated well  Successful removal bilaterally              There are no Patient Instructions on file for this visit  No follow-ups on file  Subjective:      Patient ID: Mally Rocha is a 66 y o  female  Chief Complaint   Patient presents with    Clogged ears     both ears, wax build up  ac/cma    Hearing Problem       Here today to have her ears flushed  She wears hearing aids and normally has them flushed every 3 months, but has not gone for the past year d/t COVID  She is having difficulties hearing out of both ears  No pain or drainage        The following portions of the patient's history were reviewed and updated as appropriate: allergies, current medications, past family history, past medical history, past social history, past surgical history and problem list     Review of Systems   Constitutional: Negative      HENT:        See HPI         Current Outpatient Medications   Medication Sig Dispense Refill    atorvastatin (LIPITOR) 10 mg tablet Take 1 tablet (10 mg total) by mouth every morning 90 tablet 3    brinzolamide (AZOPT) 1 % ophthalmic suspension Administer to the right eye every morning       clonazePAM (KlonoPIN) 0 5 mg tablet take 1 tablet by mouth daily 90 tablet 0    famotidine (PEPCID) 40 MG tablet take 1 tablet by mouth once daily  1/2 HOUR PRIOR TO BREAKFAST      levothyroxine 50 mcg tablet Take 1 tablet (50 mcg total) by mouth every morning 90 tablet 3    travoprost (TRAVATAN Z) 0 004 % ophthalmic solution Apply 1 drop to eye daily at bedtime Both eyes      zonisamide (ZONEGRAN) 50 MG capsule Take 1 capsule (50 mg total) by mouth daily at bedtime (Patient taking differently: Take 50 mg by mouth daily at bedtime Essential tremor of the hands and head) 90 capsule 3    onabotulinumtoxin A (Botox) 100 units Cervical Dystonia (Patient not taking: Reported on 4/1/2021) 1 each 11     Current Facility-Administered Medications   Medication Dose Route Frequency Provider Last Rate Last Admin    Botulinum Toxin Type A SOLR 200 Units  200 Units Injection Q3 Months Flynn Guzman MD   200 Units at 01/19/21 1526       Objective:    /70   Pulse 88   Temp (!) 97 3 °F (36 3 °C)   Resp 16   Ht 5' 4" (1 626 m)   Wt 47 6 kg (105 lb)   BMI 18 02 kg/m²        Physical Exam  Vitals signs and nursing note reviewed  Constitutional:       Appearance: She is well-developed  HENT:      Right Ear: There is impacted cerumen  Left Ear: There is impacted cerumen  Cardiovascular:      Rate and Rhythm: Normal rate and regular rhythm  Heart sounds: Normal heart sounds  No murmur  Pulmonary:      Effort: Pulmonary effort is normal       Breath sounds: Normal breath sounds  Skin:     General: Skin is warm and dry  Neurological:      Mental Status: She is alert                  Narda Juarez

## 2021-04-16 ENCOUNTER — TELEPHONE (OUTPATIENT)
Dept: FAMILY MEDICINE CLINIC | Facility: CLINIC | Age: 76
End: 2021-04-16

## 2021-04-16 NOTE — TELEPHONE ENCOUNTER
OLIVA   Patient called very upset  She said on 2/08/2021 you call the squad for to her house  She is now getting a bill for 200 00$ for the squad visit  She is asking if you are going to pay the bill? She said she didn't tell you to call the squad  Please advise

## 2021-04-17 NOTE — TELEPHONE ENCOUNTER
I did not send the squad without her consent, she agreed to it when we spoke on the phone that day  I had concerns about her wellbeing d/t COVID and she had no one to check on her, so I recommended that as an option and she was agreeable to that at the time of our conversation  Unfortunately, we cannot be responsible for any bills that are incurred d/t this   Aviva Corona

## 2021-04-19 ENCOUNTER — TELEPHONE (OUTPATIENT)
Dept: NEUROLOGY | Facility: CLINIC | Age: 76
End: 2021-04-19

## 2021-04-19 NOTE — TELEPHONE ENCOUNTER
Type Date User Summary Attachment   General 04/19/2021  3:11 PM Natan Kilpatrick care coordination  -   Note    Botox- no authorization needed   Please use Tammy      Thank you,     Miguel Langley

## 2021-04-19 NOTE — TELEPHONE ENCOUNTER
Botox authorization submitted to [facility] via Cover my meds  Will await an approval/denial letter  Pending authorization #: [id number]      Received an approval from [facility] via cover my meds  Approval letter will be received via fax       Authorization information:    Authorization #: [id number]  Valid dates: 4/19/2021 until 7/19/2021

## 2021-04-19 NOTE — TELEPHONE ENCOUNTER
[address] [facility] spoke with Nahomi Lorenzo advised I was calling to initiate a refill request for patient's botox medication  Ray initiated the refill request and was able to run a live claim while we were on the phone, patient's order is ready to schedule for delivery  Nahomi Lorenzo states that patient's consent for shipment is on file however the credit card they have on file is   Nahomi Lorenzo reached out to patient and was able to obtain her new credit card information, order is now ready to ship  Botox is scheduled for delivery 2021 to the [facility] via [facility]    Please await patient's botox delivery and document once received, please advise if medication is not received by 2pm     Thanks  Geronimo Quevedo

## 2021-04-19 NOTE — TELEPHONE ENCOUNTER
Patient scheduled for 5/3/21 at 9:30am with Dr Sue Corbett for Botox  Please advise if patient needs delivery for Botox  Thank you

## 2021-04-21 NOTE — TELEPHONE ENCOUNTER
Good Afternoon Dr Adolfo Davis,  For patient's last botox on 1/19/2021 Dr Maite Conteh had requested a dosage increase to 300 units for patient's  However at the time of injections Dr Maite Conteh only administer 200 units, please review patient's chart and advise how many units will be injected at patient's to ensure correct dosage is drawn up      Thanks  Vini Arteaga

## 2021-04-21 NOTE — TELEPHONE ENCOUNTER
Botox number of units: 100  Botox quantity: 3  Arrived at what location: [facility]   Lot number: [id number]  Expiration Date: 07/2023  Appt notes indicate correct medication: yes    Patient already had 100 units in the fridge   Patient has 100 units (4) in the fridge total

## 2021-04-23 NOTE — TELEPHONE ENCOUNTER
Noted, thank you      Good Morning Jefe,   Please note for patient's upcoming botox on 5/3/21 only draw up 200 units total, will edit patient's botox log to reflect that she will still have 200 units remaining in [facility] for her next injection in Aug     [address]

## 2021-04-30 ENCOUNTER — IMMUNIZATIONS (OUTPATIENT)
Dept: FAMILY MEDICINE CLINIC | Facility: HOSPITAL | Age: 76
End: 2021-04-30

## 2021-04-30 DIAGNOSIS — Z23 ENCOUNTER FOR IMMUNIZATION: Primary | ICD-10-CM

## 2021-04-30 PROCEDURE — 91301 SARS-COV-2 / COVID-19 MRNA VACCINE (MODERNA) 100 MCG: CPT

## 2021-04-30 PROCEDURE — 0011A SARS-COV-2 / COVID-19 MRNA VACCINE (MODERNA) 100 MCG: CPT

## 2021-05-03 ENCOUNTER — PROCEDURE VISIT (OUTPATIENT)
Dept: NEUROLOGY | Facility: CLINIC | Age: 76
End: 2021-05-03
Payer: COMMERCIAL

## 2021-05-03 VITALS — HEART RATE: 76 BPM | TEMPERATURE: 98.4 F | SYSTOLIC BLOOD PRESSURE: 134 MMHG | DIASTOLIC BLOOD PRESSURE: 78 MMHG

## 2021-05-03 DIAGNOSIS — G24.3 CERVICAL DYSTONIA: Primary | ICD-10-CM

## 2021-05-03 PROCEDURE — 64616 CHEMODENERV MUSC NECK DYSTON: CPT | Performed by: PSYCHIATRY & NEUROLOGY

## 2021-05-03 NOTE — PROGRESS NOTES
Universal Protocol   Consent: Written consent obtained  Consent given by: patient  Patient identity confirmed: verbally with patient        Chemodenervation     Date/Time 5/3/2021 6:56 AM     Performed by  Jamal Cote MD     Authorized by Jamal Cote MD        Pre-procedure details      Prepped With: Alcohol     Anesthesia  (see MAR for exact dosages): Anesthesia method:  None   Procedure details     Position:  Upright   Botox     Brand:  Botox    mL's of preservative free sterile saline:  1    Final Concentration per CC:  100 units    Needle gauge: 27G 1/2 inch  Procedures     Botox Procedures: cervical dystonia      Indications: spasmodic torticollis      Date of last injection:  1/19/2021   Total Units     Total units discarded:  0   Post-procedure details      Chemodenervation:  Neck, excluding muscles of the larynx    Neck Muscle Location[de-identified]  Bilateral neck muscle   Comments      Ms Bonilla White is a 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 who presents for Botox injections  Review of history reveals, head tremor started in the mid 1970s and she was initially diagnosed with ET around 1976  She reports today hand tremor was present prior to the head tremor (L>R causing her to switch dominant hands)  Medications including propranolol, primidone, topiramate, gabapentin were ineffective in controlling tremor  In 2014, she was diagnosed with cervical dystonia and underwent 3 rounds of Botox treatment (1st treatment led to head droop requiring the use of a soft cervical collar but tremor control, 2nd ineffective, 3rd seemed to exacerbate the head tremor)  Klonopin at night helped reduce the tremor to allow for sleep  Zonisamide used for hand tremor  Interval history:  Hand tremors are not bothersome  She is able to perform all activities  Head tremor is most bothersome  She felt last injections worked well and does not wish to increase the dose further   Head tremor dampens after injections  Lasting about 3 months  No side effects  No neck pain  Exam:  Moderate yes-yes head tremor with mild right laterocllis      Left trapezius hypertrophy    Injections:  RIGHT Splenious capitis: 30 units x2 (60 units total)   RIGHT levator scapuli : 25 units (higher location)  LEFT Splenious capitis: 50 units   LEFT cervical trapezius: 25 units   LEFT shoulder trapezius: 20 units x2 (40 units totoal) (one at the transition between cervical and shoulder)    Total injected: 200 units

## 2021-05-03 NOTE — TELEPHONE ENCOUNTER
Patient will be injected with 200 units so therefore there will only be 100 units remaining in the [facility] location

## 2021-06-01 ENCOUNTER — IMMUNIZATIONS (OUTPATIENT)
Dept: FAMILY MEDICINE CLINIC | Facility: HOSPITAL | Age: 76
End: 2021-06-01

## 2021-06-01 DIAGNOSIS — Z23 ENCOUNTER FOR IMMUNIZATION: Primary | ICD-10-CM

## 2021-06-01 PROCEDURE — 0012A SARS-COV-2 / COVID-19 MRNA VACCINE (MODERNA) 100 MCG: CPT

## 2021-06-01 PROCEDURE — 91301 SARS-COV-2 / COVID-19 MRNA VACCINE (MODERNA) 100 MCG: CPT

## 2021-06-16 ENCOUNTER — TELEPHONE (OUTPATIENT)
Dept: NEUROLOGY | Facility: CLINIC | Age: 76
End: 2021-06-16

## 2021-06-16 NOTE — TELEPHONE ENCOUNTER
Botox 100 unit vials located in the [place] [address]  Patient receives Botox with Dr Amy Adams- former Dr Carnella Halsted patient  Last injection: 5/3/2021  Last visit: 1/16/2020    Botox 100 unit vials QTY: 2  NDC: 1097-6683-76  Lot #: [id number]  Expiration: 7/31/2023    Will use medication at upcoming Botox appointment with Dr Amy Adams  LOV:8/19/19 SD  FOV:none on file   LAST RX:8/19/19 500 mg take 1 tab twice a day 60 tabs 0 refills   LAST LABS:4/13/18 hcv antibody,hep B surface, HIV combo,vaginitis,chlamydia/gono  PER PROTOCOL: to provider

## 2021-06-16 NOTE — TELEPHONE ENCOUNTER
Jefe,    Please reach out to the patient and schedule her next Botox injection with Dr Renee Cutler  Patient last received Botox on 5/3/21 and currently does not have her next 3 month injection scheduled  Please note we already have 2 vials of Botox 100 units in the [facility]  Once back in [place] please confirm this information and let me know the outcome      Thank you    Laverne Mcnulty

## 2021-06-21 ENCOUNTER — TELEPHONE (OUTPATIENT)
Dept: FAMILY MEDICINE CLINIC | Facility: CLINIC | Age: 76
End: 2021-06-21

## 2021-06-21 NOTE — TELEPHONE ENCOUNTER
Pt is calling about a bill for the EMS bill that she has been getting from February  Pls see phone encounter from 4/16/21  Is this something she needs to speak to billing about? Best time to call is after noon since the pt works

## 2021-06-22 NOTE — TELEPHONE ENCOUNTER
Reviewed  As per my documentation during telemedicine visit on 2/8/21, I was concerned about her mental status and oxygenation given COVID 19 infection  She did not have any family or friends who could check on her and consented to having a squad come evaluate her   Elenore Model

## 2021-06-22 NOTE — TELEPHONE ENCOUNTER
There is an encounter about this from 04-16-21  Patient is aware that we cannot be responsible for any bills received for her care  She also does  not agree that she gave consent for us to send the squad  She is not happy but aware  Patient said she cannot believe she is stuck with this bill and hung up  Will send this to Yogesh Whitehead, practice administrator, and Jonas Arguello as an Andre Cowden Ritta Dickinson, MA

## 2021-06-23 ENCOUNTER — TELEPHONE (OUTPATIENT)
Dept: NEUROLOGY | Facility: CLINIC | Age: 76
End: 2021-06-23

## 2021-06-23 NOTE — TELEPHONE ENCOUNTER
Called pt and LMOM stating that I am calling in regards to scheduling her next Botox appt and offered the soonest available appt which 08/04/2021 in the [facility]  I stated to the patient that I understand that it will be a further drive for this appt but it would be the soonest we can get her in otherwise Dr Linh Art is booked far out  I then asked the patient to please give me a call back to get this appt scheduled

## 2021-06-24 NOTE — TELEPHONE ENCOUNTER
Patient called back and stated she will not drive to [place] and would like a call back to be scheduled into [place]  The patient stated she would wait

## 2021-07-01 ENCOUNTER — TELEPHONE (OUTPATIENT)
Dept: NEUROLOGY | Facility: CLINIC | Age: 76
End: 2021-07-01

## 2021-07-01 DIAGNOSIS — G24.3 CERVICAL DYSTONIA: ICD-10-CM

## 2021-07-01 RX ORDER — CLONAZEPAM 0.5 MG/1
0.5 TABLET ORAL DAILY
Qty: 90 TABLET | Refills: 0 | Status: SHIPPED | OUTPATIENT
Start: 2021-07-01 | End: 2021-09-26 | Stop reason: SDUPTHER

## 2021-07-01 NOTE — TELEPHONE ENCOUNTER
Patient wanting to speak with you to schedule an appointment, states she rather have a day schedule she will wait

## 2021-07-01 NOTE — TELEPHONE ENCOUNTER
Jefe,    Please use the dot phrase to document the Botox that is currently in the [facility] location to confirm Medication is in correct location      Thank you    Olegario Homans

## 2021-07-01 NOTE — TELEPHONE ENCOUNTER
Called patient and spoke to Michelle in regards to scheduling her next [facility] as there was an opening in Dr Loreli Denver schedule  I then offered an appt on 08/23/2021 at 1:30 PM in our [facility] office to which she agreed to  Patient is now scheduled for her Botox

## 2021-07-01 NOTE — TELEPHONE ENCOUNTER
Called patient and spoke to Michelle in regards to scheduling her next [facility] as there was an opening in Dr Saleem Sullivan schedule  I then offered an appt on 08/23/2021 at 1:30 PM in our [facility] office to which she agreed to  Patient is now scheduled for her Botox

## 2021-07-01 NOTE — TELEPHONE ENCOUNTER
Type Date User Summary Attachment   General 07/01/2021 12:25 PM Aliza Alex care coordination  -   Note    Botox- no authorization needed   Please use Tammy      Thank you     Maggy Lorenzo

## 2021-07-02 NOTE — TELEPHONE ENCOUNTER
Botox number of units: 200 unit  Botox quantity: Two (100u)  Arrived at what location: [facility]  Botox at Correct Administering Location: Yes  NDC number: 6187-0152-13  Lot number: [id number]  Expiration Date: 07/2023  Appt notes indicate correct medication: Yes

## 2021-08-07 ENCOUNTER — APPOINTMENT (OUTPATIENT)
Dept: RADIOLOGY | Facility: CLINIC | Age: 76
End: 2021-08-07
Payer: COMMERCIAL

## 2021-08-07 ENCOUNTER — OFFICE VISIT (OUTPATIENT)
Dept: FAMILY MEDICINE CLINIC | Facility: CLINIC | Age: 76
End: 2021-08-07
Payer: COMMERCIAL

## 2021-08-07 VITALS
BODY MASS INDEX: 18.1 KG/M2 | WEIGHT: 106 LBS | SYSTOLIC BLOOD PRESSURE: 130 MMHG | HEIGHT: 64 IN | DIASTOLIC BLOOD PRESSURE: 54 MMHG | RESPIRATION RATE: 16 BRPM | TEMPERATURE: 97.9 F | HEART RATE: 76 BPM | OXYGEN SATURATION: 99 %

## 2021-08-07 DIAGNOSIS — M79.672 FOOT PAIN, LEFT: ICD-10-CM

## 2021-08-07 DIAGNOSIS — M79.672 FOOT PAIN, LEFT: Primary | ICD-10-CM

## 2021-08-07 PROCEDURE — 1036F TOBACCO NON-USER: CPT | Performed by: FAMILY MEDICINE

## 2021-08-07 PROCEDURE — 1160F RVW MEDS BY RX/DR IN RCRD: CPT | Performed by: FAMILY MEDICINE

## 2021-08-07 PROCEDURE — 99213 OFFICE O/P EST LOW 20 MIN: CPT | Performed by: FAMILY MEDICINE

## 2021-08-07 PROCEDURE — 73630 X-RAY EXAM OF FOOT: CPT

## 2021-08-07 NOTE — PROGRESS NOTES
Assessment/Plan:    1  Foot pain, left  -     XR foot 3+ vw left; Future; Expected date: 08/07/2021    I think pt may have a fracture in 5th ray  Advised stiff shoe as that gives her relief  Will get x ray  Pt does not want meds  Will follow result        There are no Patient Instructions on file for this visit  No follow-ups on file  Subjective:      Patient ID: Trey Quan is a 64 y o  female  Chief Complaint   Patient presents with    Foot Pain     left side   sas/cma       Thursday night pt went [place] [employer] dancing  No incident  At 330 in the am she got up to use the bathroom, when she went to step on her left foot she could not due to pain  Most of pain was on left side of foot  Next AM - with shoes on she could hobble, without shoes it was worse  Pt went to bed - today as she woke up it hurts even more  No falls no trauma  7/10 pain when she pots weight on it, heel does not really hurt      The following portions of the patient's history were reviewed and updated as appropriate: allergies, current medications, past family history, past medical history, past social history, past surgical history and problem list     Review of Systems   Constitutional: Negative  Negative for activity change, appetite change, chills, diaphoresis and fatigue  HENT: Negative  Negative for dental problem, ear pain, sinus pressure and sore throat  Eyes: Negative  Negative for photophobia, pain, discharge, redness, itching and visual disturbance  Respiratory: Negative for apnea and chest tightness  Cardiovascular: Negative  Negative for chest pain, palpitations and leg swelling  Gastrointestinal: Negative  Negative for abdominal distention, abdominal pain, constipation and diarrhea  Endocrine: Negative  Negative for cold intolerance and heat intolerance  Genitourinary: Negative  Negative for difficulty urinating and dyspareunia     Musculoskeletal: Positive for arthralgias and gait problem  Negative for back pain  Skin: Negative  Allergic/Immunologic: Negative for environmental allergies  Neurological: Negative for dizziness  Psychiatric/Behavioral: Negative  Negative for agitation  Current Outpatient Medications   Medication Sig Dispense Refill    atorvastatin (LIPITOR) 10 mg tablet Take 1 tablet (10 mg total) by mouth every morning 90 tablet 3    brinzolamide (AZOPT) 1 % ophthalmic suspension Administer to the right eye every morning       clonazePAM (KlonoPIN) 0 5 mg tablet Take 1 tablet (0 5 mg total) by mouth daily 90 tablet 0    famotidine (PEPCID) 40 MG tablet take 1 tablet by mouth once daily  1/2 HOUR PRIOR TO BREAKFAST      levothyroxine 50 mcg tablet Take 1 tablet (50 mcg total) by mouth every morning 90 tablet 3    onabotulinumtoxin A (Botox) 100 units Cervical Dystonia 1 each 11    travoprost (TRAVATAN Z) 0 004 % ophthalmic solution Apply 1 drop to eye daily at bedtime Both eyes      zonisamide (ZONEGRAN) 50 MG capsule Take 1 capsule (50 mg total) by mouth daily at bedtime (Patient not taking: Reported on 8/7/2021) 90 capsule 3     Current Facility-Administered Medications   Medication Dose Route Frequency Provider Last Rate Last Admin    Botulinum Toxin Type A SOLR 200 Units  200 Units Injection Q3 Months Maikel Jarrell MD   200 Units at 01/19/21 1526       Objective:    /54   Pulse 76   Temp 97 9 °F (36 6 °C)   Resp 16   Ht 5' 4" (1 626 m)   Wt 48 1 kg (106 lb)   SpO2 99%   BMI 18 19 kg/m²        Physical Exam  Vitals and nursing note reviewed  Constitutional:       General: She is not in acute distress  Appearance: She is well-developed  She is not diaphoretic  HENT:      Head: Normocephalic and atraumatic  Right Ear: External ear normal       Left Ear: External ear normal       Nose: Nose normal       Mouth/Throat:      Pharynx: No oropharyngeal exudate  Eyes:      General: No scleral icterus          Right eye: No discharge  Left eye: No discharge  Pupils: Pupils are equal, round, and reactive to light  Neck:      Thyroid: No thyromegaly  Cardiovascular:      Rate and Rhythm: Normal rate  Heart sounds: Normal heart sounds  No murmur heard  Pulmonary:      Effort: Pulmonary effort is normal  No respiratory distress  Breath sounds: Normal breath sounds  No wheezing  Abdominal:      General: Bowel sounds are normal  There is no distension  Palpations: Abdomen is soft  There is no mass  Tenderness: There is no abdominal tenderness  There is no guarding or rebound  Musculoskeletal:         General: Normal range of motion  Comments: Limping due to foot pain  Very tender at lat aspect foot   Skin:     General: Skin is warm and dry  Findings: No erythema or rash  Neurological:      Mental Status: She is alert        Coordination: Coordination normal       Deep Tendon Reflexes: Reflexes normal    Psychiatric:         Behavior: Behavior normal                 Margaret Rust, DO

## 2021-08-10 ENCOUNTER — TELEPHONE (OUTPATIENT)
Dept: GASTROENTEROLOGY | Facility: CLINIC | Age: 76
End: 2021-08-10

## 2021-08-10 NOTE — TELEPHONE ENCOUNTER
Returned pts call  She has a recall in [address] [place] [address] [place] for an EGD with Dr Estella Cook, hx of barretts  I called back and lmom for her to call back and we will get her scheduled  If she calls please get her scheduled  She last had procedures at [facility]

## 2021-08-23 ENCOUNTER — PROCEDURE VISIT (OUTPATIENT)
Dept: NEUROLOGY | Facility: CLINIC | Age: 76
End: 2021-08-23
Payer: COMMERCIAL

## 2021-08-23 VITALS
HEIGHT: 64 IN | DIASTOLIC BLOOD PRESSURE: 70 MMHG | BODY MASS INDEX: 18.44 KG/M2 | WEIGHT: 108 LBS | TEMPERATURE: 97.6 F | SYSTOLIC BLOOD PRESSURE: 120 MMHG | HEART RATE: 68 BPM

## 2021-08-23 DIAGNOSIS — G24.3 CERVICAL DYSTONIA: Primary | ICD-10-CM

## 2021-08-23 PROCEDURE — 64616 CHEMODENERV MUSC NECK DYSTON: CPT | Performed by: PSYCHIATRY & NEUROLOGY

## 2021-08-23 NOTE — PROGRESS NOTES
Universal Protocol   Consent: Written consent obtained  Consent given by: patient  Patient identity confirmed: verbally with patient        Chemodenervation     Date/Time 8/23/2021 12:15 PM     Performed by  Trish Faustin MD     Authorized by Trish Faustin MD        Pre-procedure details      Prepped With: Alcohol     Anesthesia  (see MAR for exact dosages): Anesthesia method:  None   Procedure details     Position:  Upright   Botox     Brand:  Botox    mL's of preservative free sterile saline:  1    Final Concentration per CC:  100 units    Needle gauge: 30G    Procedures     Botox Procedures: cervical dystonia      Indications: spasmodic torticollis      Date of last injection:  5/3/2021   Total Units     Total units discarded:  0   Post-procedure details      Chemodenervation:  Neck, excluding muscles of the larynx    Neck Muscle Location[de-identified]  Bilateral neck muscle    Patient tolerance of procedure: Tolerated well, no immediate complications   Comments      Ms Yajaira Faith is a 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 who presents for Botox injections  Review of history reveals, head tremor started in the mid 1970s and she was initially diagnosed with ET around 1976  She reports today hand tremor was present prior to the head tremor (L>R causing her to switch dominant hands)  Medications including propranolol, primidone, topiramate, gabapentin were ineffective in controlling tremor  In 2014, she was diagnosed with cervical dystonia and underwent 3 rounds of Botox treatment (1st treatment led to head droop requiring the use of a soft cervical collar but tremor control, 2nd ineffective, 3rd seemed to exacerbate the head tremor)  Klonopin at night helped reduce the tremor to allow for sleep  Zonisamide used for hand tremor  Interval history:  Head tremor is most bothersome  Head tremor dampens after injections  Lasts about 3 months  No neck pain    She has mild right shoulder pain that lasted a few days  She is not sure if it was related or if this was from her cervical disc disease       Exam:  Moderate yes-yes head tremor with mild right laterocllis      Left trapezius hypertrophy     Injections:  RIGHT Splenious capitis: 30 units x2 (60 units total)   RIGHT levator scapuli : 25 units (higher location)  LEFT Splenious capitis: 50 units   LEFT cervical trapezius: 25 units   LEFT shoulder trapezius: 20 units x2 (40 units totoal) (one at the transition between cervical and shoulder)     Total injected: 200 units

## 2021-08-24 ENCOUNTER — TELEPHONE (OUTPATIENT)
Dept: NEUROLOGY | Facility: CLINIC | Age: 76
End: 2021-08-24

## 2021-08-24 NOTE — TELEPHONE ENCOUNTER
Tried to call patient to let her know about her scheduled appointments with Dr Yousif Chin on 3/7/22 and 6/13/2  Mailed appointment cards to patient

## 2021-08-30 NOTE — TELEPHONE ENCOUNTER
Patient had left message asking for a call to schedule her EGD  I returned her call and had to leave message for her to call back

## 2021-08-31 ENCOUNTER — PREP FOR PROCEDURE (OUTPATIENT)
Dept: GASTROENTEROLOGY | Facility: CLINIC | Age: 76
End: 2021-08-31

## 2021-08-31 DIAGNOSIS — K22.70 BARRETT'S ESOPHAGUS WITHOUT DYSPLASIA: Primary | ICD-10-CM

## 2021-09-03 LAB
ALBUMIN SERPL-MCNC: 4.7 G/DL (ref 3.6–5.1)
ALBUMIN/GLOB SERPL: 1.6 (CALC) (ref 1–2.5)
ALP SERPL-CCNC: 83 U/L (ref 37–153)
ALT SERPL-CCNC: 13 U/L (ref 6–29)
AST SERPL-CCNC: 22 U/L (ref 10–35)
BASOPHILS # BLD AUTO: 81 CELLS/UL (ref 0–200)
BASOPHILS NFR BLD AUTO: 1 %
BILIRUB SERPL-MCNC: 0.6 MG/DL (ref 0.2–1.2)
BUN SERPL-MCNC: 10 MG/DL (ref 7–25)
BUN/CREAT SERPL: 10 (CALC) (ref 6–22)
CALCIUM SERPL-MCNC: 10 MG/DL (ref 8.6–10.4)
CHLORIDE SERPL-SCNC: 92 MMOL/L (ref 98–110)
CHOLEST SERPL-MCNC: 175 MG/DL
CHOLEST/HDLC SERPL: 2.2 (CALC)
CO2 SERPL-SCNC: 29 MMOL/L (ref 20–32)
CREAT SERPL-MCNC: 0.97 MG/DL (ref 0.6–0.93)
EOSINOPHIL # BLD AUTO: 373 CELLS/UL (ref 15–500)
EOSINOPHIL NFR BLD AUTO: 4.6 %
ERYTHROCYTE [DISTWIDTH] IN BLOOD BY AUTOMATED COUNT: 11.6 % (ref 11–15)
EST. AVERAGE GLUCOSE BLD GHB EST-MCNC: 114 (CALC)
EST. AVERAGE GLUCOSE BLD GHB EST-SCNC: 6.3 (CALC)
GLOBULIN SER CALC-MCNC: 2.9 G/DL (CALC) (ref 1.9–3.7)
GLUCOSE SERPL-MCNC: 105 MG/DL (ref 65–99)
HBA1C MFR BLD: 5.6 % OF TOTAL HGB
HCT VFR BLD AUTO: 38 % (ref 35–45)
HDLC SERPL-MCNC: 79 MG/DL
HGB BLD-MCNC: 13 G/DL (ref 11.7–15.5)
LDLC SERPL CALC-MCNC: 75 MG/DL (CALC)
LYMPHOCYTES # BLD AUTO: 1831 CELLS/UL (ref 850–3900)
LYMPHOCYTES NFR BLD AUTO: 22.6 %
MCH RBC QN AUTO: 30.8 PG (ref 27–33)
MCHC RBC AUTO-ENTMCNC: 34.2 G/DL (ref 32–36)
MCV RBC AUTO: 90 FL (ref 80–100)
MONOCYTES # BLD AUTO: 851 CELLS/UL (ref 200–950)
MONOCYTES NFR BLD AUTO: 10.5 %
NEUTROPHILS # BLD AUTO: 4965 CELLS/UL (ref 1500–7800)
NEUTROPHILS NFR BLD AUTO: 61.3 %
NONHDLC SERPL-MCNC: 96 MG/DL (CALC)
PLATELET # BLD AUTO: 312 THOUSAND/UL (ref 140–400)
PMV BLD REES-ECKER: 8.7 FL (ref 7.5–12.5)
POTASSIUM SERPL-SCNC: 5.3 MMOL/L (ref 3.5–5.3)
PROT SERPL-MCNC: 7.6 G/DL (ref 6.1–8.1)
RBC # BLD AUTO: 4.22 MILLION/UL (ref 3.8–5.1)
SL AMB EGFR AFRICAN AMERICAN: 65 ML/MIN/1.73M2
SL AMB EGFR NON AFRICAN AMERICAN: 56 ML/MIN/1.73M2
SODIUM SERPL-SCNC: 129 MMOL/L (ref 135–146)
TRIGL SERPL-MCNC: 120 MG/DL
TSH SERPL-ACNC: 4.37 MIU/L (ref 0.4–4.5)
WBC # BLD AUTO: 8.1 THOUSAND/UL (ref 3.8–10.8)

## 2021-09-04 DIAGNOSIS — E03.9 HYPOTHYROIDISM, UNSPECIFIED TYPE: ICD-10-CM

## 2021-09-08 RX ORDER — LEVOTHYROXINE SODIUM 0.05 MG/1
50 TABLET ORAL EVERY MORNING
Qty: 90 TABLET | Refills: 3 | Status: SHIPPED | OUTPATIENT
Start: 2021-09-08

## 2021-09-10 DIAGNOSIS — K21.9 GASTROESOPHAGEAL REFLUX DISEASE WITHOUT ESOPHAGITIS: Primary | ICD-10-CM

## 2021-09-10 RX ORDER — FAMOTIDINE 40 MG/1
40 TABLET, FILM COATED ORAL DAILY
Qty: 30 TABLET | Refills: 3 | Status: SHIPPED | OUTPATIENT
Start: 2021-09-10 | End: 2021-12-06 | Stop reason: HOSPADM

## 2021-09-16 ENCOUNTER — OFFICE VISIT (OUTPATIENT)
Dept: FAMILY MEDICINE CLINIC | Facility: CLINIC | Age: 76
End: 2021-09-16
Payer: COMMERCIAL

## 2021-09-16 VITALS
DIASTOLIC BLOOD PRESSURE: 90 MMHG | RESPIRATION RATE: 16 BRPM | TEMPERATURE: 97.7 F | OXYGEN SATURATION: 99 % | WEIGHT: 107 LBS | SYSTOLIC BLOOD PRESSURE: 130 MMHG | BODY MASS INDEX: 18.27 KG/M2 | HEART RATE: 80 BPM | HEIGHT: 64 IN

## 2021-09-16 DIAGNOSIS — E78.2 MIXED HYPERLIPIDEMIA: ICD-10-CM

## 2021-09-16 DIAGNOSIS — Z00.00 MEDICARE ANNUAL WELLNESS VISIT, SUBSEQUENT: Primary | ICD-10-CM

## 2021-09-16 DIAGNOSIS — Z23 NEED FOR VACCINATION: ICD-10-CM

## 2021-09-16 DIAGNOSIS — E87.1 HYPONATREMIA: ICD-10-CM

## 2021-09-16 DIAGNOSIS — R73.09 ELEVATED GLUCOSE: ICD-10-CM

## 2021-09-16 DIAGNOSIS — E03.9 HYPOTHYROIDISM, UNSPECIFIED TYPE: ICD-10-CM

## 2021-09-16 PROCEDURE — 3288F FALL RISK ASSESSMENT DOCD: CPT | Performed by: INTERNAL MEDICINE

## 2021-09-16 PROCEDURE — G0439 PPPS, SUBSEQ VISIT: HCPCS | Performed by: INTERNAL MEDICINE

## 2021-09-16 PROCEDURE — G0008 ADMIN INFLUENZA VIRUS VAC: HCPCS

## 2021-09-16 PROCEDURE — 1160F RVW MEDS BY RX/DR IN RCRD: CPT | Performed by: INTERNAL MEDICINE

## 2021-09-16 PROCEDURE — 3725F SCREEN DEPRESSION PERFORMED: CPT | Performed by: INTERNAL MEDICINE

## 2021-09-16 PROCEDURE — 1125F AMNT PAIN NOTED PAIN PRSNT: CPT | Performed by: INTERNAL MEDICINE

## 2021-09-16 PROCEDURE — 99214 OFFICE O/P EST MOD 30 MIN: CPT | Performed by: INTERNAL MEDICINE

## 2021-09-16 PROCEDURE — 1170F FXNL STATUS ASSESSED: CPT | Performed by: INTERNAL MEDICINE

## 2021-09-16 PROCEDURE — 1036F TOBACCO NON-USER: CPT | Performed by: INTERNAL MEDICINE

## 2021-09-16 PROCEDURE — 90662 IIV NO PRSV INCREASED AG IM: CPT

## 2021-09-16 NOTE — ASSESSMENT & PLAN NOTE
She has been drinking a lot of water in an effort to bring her creatinine down  Advised to cut back and discussed possible ill effects from hyponatremia including possible seizures with severe hyponatremia  Will follow labs

## 2021-09-16 NOTE — PATIENT INSTRUCTIONS
Medicare Preventive Visit Patient Instructions  Thank you for completing your Welcome to Medicare Visit or Medicare Annual Wellness Visit today  Your next wellness visit will be due in one year (9/17/2022)  The screening/preventive services that you may require over the next 5-10 years are detailed below  Some tests may not apply to you based off risk factors and/or age  Screening tests ordered at today's visit but not completed yet may show as past due  Also, please note that scanned in results may not display below  Preventive Screenings:  Service Recommendations Previous Testing/Comments   Colorectal Cancer Screening  * Colonoscopy    * Fecal Occult Blood Test (FOBT)/Fecal Immunochemical Test (FIT)  * Fecal DNA/Cologuard Test  * Flexible Sigmoidoscopy Age: 54-65 years old   Colonoscopy: every 10 years (may be performed more frequently if at higher risk)  OR  FOBT/FIT: every 1 year  OR  Cologuard: every 3 years  OR  Sigmoidoscopy: every 5 years  Screening may be recommended earlier than age 48 if at higher risk for colorectal cancer  Also, an individualized decision between you and your healthcare provider will decide whether screening between the ages of 74-80 would be appropriate  Colonoscopy: 08/26/2020  FOBT/FIT: Not on file  Cologuard: Not on file  Sigmoidoscopy: Not on file          Breast Cancer Screening Age: 36 years old  Frequency: every 1-2 years  Not required if history of left and right mastectomy Mammogram: 11/08/2018        Cervical Cancer Screening Between the ages of 21-29, pap smear recommended once every 3 years  Between the ages of 33-67, can perform pap smear with HPV co-testing every 5 years     Recommendations may differ for women with a history of total hysterectomy, cervical cancer, or abnormal pap smears in past  Pap Smear: Not on file    Screening Not Indicated   Hepatitis C Screening Once for adults born between [facility]  More frequently in patients at high risk for Hepatitis C Hep C Antibody: Not on file        Diabetes Screening 1-2 times per year if you're at risk for diabetes or have pre-diabetes Fasting glucose: No results in last 5 years   A1C: 5 6 % of total Hgb    Screening Current   Cholesterol Screening Once every 5 years if you don't have a lipid disorder  May order more often based on risk factors  Lipid panel: 09/03/2021    Screening Not Indicated  History Lipid Disorder     Other Preventive Screenings Covered by Medicare:  1  Abdominal Aortic Aneurysm (AAA) Screening: covered once if your at risk  You're considered to be at risk if you have a family history of AAA  2  Lung Cancer Screening: covers low dose CT scan once per year if you meet all of the following conditions: (1) Age 50-69; (2) No signs or symptoms of lung cancer; (3) Current smoker or have quit smoking within the last 15 years; (4) You have a tobacco smoking history of at least 30 pack years (packs per day multiplied by number of years you smoked); (5) You get a written order from a healthcare provider  3  Glaucoma Screening: covered annually if you're considered high risk: (1) You have diabetes OR (2) Family history of glaucoma OR (3)  aged 48 and older OR (3)  American aged 72 and older  3  Osteoporosis Screening: covered every 2 years if you meet one of the following conditions: (1) You're estrogen deficient and at risk for osteoporosis based off medical history and other findings; (2) Have a vertebral abnormality; (3) On glucocorticoid therapy for more than 3 months; (4) Have primary hyperparathyroidism; (5) On osteoporosis medications and need to assess response to drug therapy  · Last bone density test (DXA Scan): Not on file  5  HIV Screening: covered annually if you're between the age of 12-76  Also covered annually if you are younger than 13 and older than 72 with risk factors for HIV infection   For pregnant patients, it is covered up to 3 times per pregnancy  Immunizations:  Immunization Recommendations   Influenza Vaccine Annual influenza vaccination during flu season is recommended for all persons aged >= 6 months who do not have contraindications   Pneumococcal Vaccine (Prevnar and Pneumovax)  * Prevnar = PCV13  * Pneumovax = PPSV23   Adults 25-60 years old: 1-3 doses may be recommended based on certain risk factors  Adults 72 years old: Prevnar (PCV13) vaccine recommended followed by Pneumovax (PPSV23) vaccine  If already received PPSV23 since turning 65, then PCV13 recommended at least one year after PPSV23 dose  Hepatitis B Vaccine 3 dose series if at intermediate or high risk (ex: diabetes, end stage renal disease, liver disease)   Tetanus (Td) Vaccine - COST NOT COVERED BY MEDICARE PART B Following completion of primary series, a booster dose should be given every 10 years to maintain immunity against tetanus  Td may also be given as tetanus wound prophylaxis  Tdap Vaccine - COST NOT COVERED BY MEDICARE PART B Recommended at least once for all adults  For pregnant patients, recommended with each pregnancy  Shingles Vaccine (Shingrix) - COST NOT COVERED BY MEDICARE PART B  2 shot series recommended in those aged 48 and above     Health Maintenance Due:      Topic Date Due    Hepatitis C Screening  Never done    Breast Cancer Screening: Mammogram  11/08/2019     Immunizations Due:      Topic Date Due    DTaP,Tdap,and Td Vaccines (1 - Tdap) Never done    Influenza Vaccine (1) 09/01/2021     Advance Directives   What are advance directives? Advance directives are legal documents that state your wishes and plans for medical care  These plans are made ahead of time in case you lose your ability to make decisions for yourself  Advance directives can apply to any medical decision, such as the treatments you want, and if you want to donate organs  What are the types of advance directives?   There are many types of advance directives, and each state has rules about how to use them  You may choose a combination of any of the following:  · Living will: This is a written record of the treatment you want  You can also choose which treatments you do not want, which to limit, and which to stop at a certain time  This includes surgery, medicine, IV fluid, and tube feedings  · Durable power of  for healthcare [facility]): This is a written record that states who you want to make healthcare choices for you when you are unable to make them for yourself  This person, called a proxy, is usually a family member or a friend  You may choose more than 1 proxy  · Do not resuscitate (DNR) order:  A DNR order is used in case your heart stops beating or you stop breathing  It is a request not to have certain forms of treatment, such as CPR  A DNR order may be included in other types of advance directives  · Medical directive: This covers the care that you want if you are in a coma, near death, or unable to make decisions for yourself  You can list the treatments you want for each condition  Treatment may include pain medicine, surgery, blood transfusions, dialysis, IV or tube feedings, and a ventilator (breathing machine)  · Values history: This document has questions about your views, beliefs, and how you feel and think about life  This information can help others choose the care that you would choose  Why are advance directives important? An advance directive helps you control your care  Although spoken wishes may be used, it is better to have your wishes written down  Spoken wishes can be misunderstood, or not followed  Treatments may be given even if you do not want them  An advance directive may make it easier for your family to make difficult choices about your care  Underweight  Underweight is defined as having a body mass index (BMI) of less than 18 5 kg/m2   Anorexia  is a loss of appetite, decreased food intake, or both   Your appetite naturally decreases as you get older  You also get full faster than you used to  This occurs because your body needs less energy  Other body changes can also lead to a decreased appetite  Even though some appetite loss is normal, you still need to get enough calories and nutrients to keep you healthy  You can start to lose too much weight if you do not eat as much food as your body needs  Unwanted weight loss can cause health problems, or worsen health problems you already have  You can also become dehydrated if you do not drink enough liquid  How to eat healthy and get enough nutrients:   · Choose healthy foods  Eat a variety of fruits, vegetables, whole grains, low-fat dairy foods, lean meats, and other protein foods  Limit foods high in fat, sugar, and salt  Limit or avoid alcohol as directed  Work with a dietitian to help you plan your meals if you need to follow a special diet  A dietitian can also teach you how to modify foods if you have trouble chewing or swallowing  · Snack on healthy foods between meals  if you only eat a small amount during meals  Snacks provide extra healthy nutrients and calories between meals  Examples include fruit, cheese, and whole grain crackers  · Drink liquids as directed  to avoid dehydration  Drink liquids between meals if they cause you to get full too quickly during meals  Ask how much liquid to drink each day and which liquids are best for you  · Use herbs, spices, and flavor enhancers to add flavor to foods  Avoid using herbs and spice blends that also contain sodium  Ask your healthcare provider or dietitian about flavor enhancers  Flavor enhancers with ham, natural fitzpatrick, and roast beef flavors can also be sprinkled on food to add flavor  · Share meals with others as often as you can  Eating with others may help you to eat better during meal time  Ask family members, neighbors, or friends to join you for lunch   There are also senior centers where you can meet people, and share meals with them  · Ask family and friends for help  with shopping or preparing foods  Ask for a ride to the grocery store, if needed  © Copyright [employer] 2018 Information is for End User's use only and may not be sold, redistributed or otherwise used for commercial purposes   All illustrations and images included in CareNotes® are the copyrighted property of A D A M , Inc  or [address] [place] [address]

## 2021-09-26 DIAGNOSIS — G24.3 CERVICAL DYSTONIA: ICD-10-CM

## 2021-09-29 RX ORDER — CLONAZEPAM 0.5 MG/1
0.5 TABLET ORAL DAILY
Qty: 90 TABLET | Refills: 0 | Status: SHIPPED | OUTPATIENT
Start: 2021-09-29 | End: 2021-12-27 | Stop reason: SDUPTHER

## 2021-10-04 ENCOUNTER — TELEPHONE (OUTPATIENT)
Dept: PREADMISSION TESTING | Facility: HOSPITAL | Age: 76
End: 2021-10-04

## 2021-10-07 VITALS — WEIGHT: 102 LBS | HEIGHT: 64 IN | BODY MASS INDEX: 17.42 KG/M2

## 2021-10-22 DIAGNOSIS — E78.2 MIXED HYPERLIPIDEMIA: ICD-10-CM

## 2021-10-22 RX ORDER — ATORVASTATIN CALCIUM 10 MG/1
10 TABLET, FILM COATED ORAL EVERY MORNING
Qty: 90 TABLET | Refills: 0 | Status: SHIPPED | OUTPATIENT
Start: 2021-10-22 | End: 2022-01-25

## 2021-11-02 ENCOUNTER — TELEPHONE (OUTPATIENT)
Dept: NEUROLOGY | Facility: CLINIC | Age: 76
End: 2021-11-02

## 2021-11-20 NOTE — ASSESSMENT & PLAN NOTE
1. Alcohol Use Disorder only- AUDIT Score: Not completed    2. Blood Alcohol Level in Emergency Room: None Detected  According to the National [place] on Alcohol Abuse and Alcoholism (NIAAA) the following are the recommended Maximum Drinking Limits. People are advised to stay within these limits:   For healthy men up to 65   * No more than 4 drinks in a day AND no more than 14 drinks in a week   For healthy women (and healthy men over age 65)   * No more than 3 drinks in a day and no more than 7 drinks in a week     3. Discussion of patient results on AUDIT/BAL/Substance Abuse Assessment compared to national norms:     4. According to the National [place] on Alcohol Abuse and Alcoholism (NIAAA) the following are negative consequences of Alcohol Use.   Brain: (includes emotional effects) Alcohol interferes with the brain's communication pathways, and can affect the way the brain looks and works. These disruptions can change mood, emotions, behavior, and make it harder to think clearly, react appropriately, and move with coordination.     Heart: Drinking a lot over a long time or too much on a single occasion can damage the heart, causing problems including:   * Cardiomyopathy - Stretching and drooping of heart muscle   * Arrhythmias - Irregular heart beat   * Stroke   * High blood pressure Research also shows that drinking moderate amounts of alcohol may protect healthy adults from developing coronary heart disease.   Liver: Heavy drinking takes a toll on the liver, and can lead to a variety of problems and liver inflammations including:   * Steatosis, or fatty liver   * Alcoholic hepatitis   * Fibrosis   * Cirrhosis   Pancreas: Alcohol causes the pancreas to produce toxic substances that can eventually lead to pancreatitis, a dangerous inflammation and swelling of the blood vessels in the pancreas that prevents proper digestion.   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 Chronic drinkers are more liable to contract diseases like pneumonia and tuberculosis than people who do not drink too much. Drinking a lot on a single occasion slows your body's ability to padilla off infections - even up to 24 hours after getting drunk.     5. Occupational consequences: According to the National [place] on Alcohol and Drug Dependence, the impact of alcoholism and drug dependence in the workplace often focuses on four major issues:   * Premature death/fatal accidents   * Injuries/accident rates   * Absenteeism/extra sick leave   * Loss of production     6. Discussion about the following points about negative consequences took place: All of the above was discussed with patient, patient was receptive.     7. Discussion regarding severity of problem: Patient does recognize and acknowledge the severity of his substance use.     8. The following therapeutic interventions have been recommended: Inpatient substance abuse treatment followed by outpatient treatment.     9. Patient decision regarding alcohol use and treatment: Patient does want residential treatment.

## 2021-11-26 ENCOUNTER — OFFICE VISIT (OUTPATIENT)
Dept: FAMILY MEDICINE CLINIC | Facility: CLINIC | Age: 76
End: 2021-11-26
Payer: COMMERCIAL

## 2021-11-26 VITALS
HEART RATE: 76 BPM | OXYGEN SATURATION: 98 % | SYSTOLIC BLOOD PRESSURE: 142 MMHG | BODY MASS INDEX: 18.44 KG/M2 | TEMPERATURE: 96.7 F | WEIGHT: 108 LBS | RESPIRATION RATE: 18 BRPM | HEIGHT: 64 IN | DIASTOLIC BLOOD PRESSURE: 70 MMHG

## 2021-11-26 DIAGNOSIS — E03.9 HYPOTHYROIDISM, UNSPECIFIED TYPE: ICD-10-CM

## 2021-11-26 DIAGNOSIS — E78.2 MIXED HYPERLIPIDEMIA: ICD-10-CM

## 2021-11-26 DIAGNOSIS — B34.9 VIRAL INFECTION, UNSPECIFIED: Primary | ICD-10-CM

## 2021-11-26 PROCEDURE — U0003 INFECTIOUS AGENT DETECTION BY NUCLEIC ACID (DNA OR RNA); SEVERE ACUTE RESPIRATORY SYNDROME CORONAVIRUS 2 (SARS-COV-2) (CORONAVIRUS DISEASE [COVID-19]), AMPLIFIED PROBE TECHNIQUE, MAKING USE OF HIGH THROUGHPUT TECHNOLOGIES AS DESCRIBED BY CMS-2020-01-R: HCPCS | Performed by: NURSE PRACTITIONER

## 2021-11-26 PROCEDURE — U0005 INFEC AGEN DETEC AMPLI PROBE: HCPCS | Performed by: NURSE PRACTITIONER

## 2021-11-26 PROCEDURE — 99214 OFFICE O/P EST MOD 30 MIN: CPT | Performed by: NURSE PRACTITIONER

## 2021-11-27 LAB — SARS-COV-2 RNA RESP QL NAA+PROBE: NEGATIVE

## 2021-11-29 ENCOUNTER — OFFICE VISIT (OUTPATIENT)
Dept: NEUROLOGY | Facility: CLINIC | Age: 76
End: 2021-11-29
Payer: COMMERCIAL

## 2021-11-29 ENCOUNTER — TELEPHONE (OUTPATIENT)
Dept: NEUROLOGY | Facility: CLINIC | Age: 76
End: 2021-11-29

## 2021-11-29 VITALS
SYSTOLIC BLOOD PRESSURE: 122 MMHG | BODY MASS INDEX: 18.19 KG/M2 | WEIGHT: 106 LBS | DIASTOLIC BLOOD PRESSURE: 78 MMHG | TEMPERATURE: 97.8 F

## 2021-11-29 DIAGNOSIS — G25.0 ESSENTIAL TREMOR: ICD-10-CM

## 2021-11-29 DIAGNOSIS — G24.3 CERVICAL DYSTONIA: Primary | ICD-10-CM

## 2021-11-29 PROCEDURE — 1160F RVW MEDS BY RX/DR IN RCRD: CPT | Performed by: PSYCHIATRY & NEUROLOGY

## 2021-11-29 PROCEDURE — 1036F TOBACCO NON-USER: CPT | Performed by: PSYCHIATRY & NEUROLOGY

## 2021-11-29 PROCEDURE — 99213 OFFICE O/P EST LOW 20 MIN: CPT | Performed by: PSYCHIATRY & NEUROLOGY

## 2021-11-30 ENCOUNTER — TELEPHONE (OUTPATIENT)
Dept: PREADMISSION TESTING | Facility: HOSPITAL | Age: 76
End: 2021-11-30

## 2021-11-30 VITALS — BODY MASS INDEX: 18.54 KG/M2 | WEIGHT: 108 LBS

## 2021-12-03 ENCOUNTER — IMMUNIZATIONS (OUTPATIENT)
Dept: FAMILY MEDICINE CLINIC | Facility: HOSPITAL | Age: 76
End: 2021-12-03

## 2021-12-03 DIAGNOSIS — Z23 ENCOUNTER FOR IMMUNIZATION: Primary | ICD-10-CM

## 2021-12-03 PROCEDURE — 0064A COVID-19 MODERNA VACC 0.25 ML BOOSTER: CPT

## 2021-12-03 PROCEDURE — 91306 COVID-19 MODERNA VACC 0.25 ML BOOSTER: CPT

## 2021-12-06 ENCOUNTER — HOSPITAL ENCOUNTER (OUTPATIENT)
Dept: GASTROENTEROLOGY | Facility: AMBULARY SURGERY CENTER | Age: 76
Setting detail: OUTPATIENT SURGERY
Discharge: HOME | End: 2021-12-06
Attending: INTERNAL MEDICINE | Admitting: INTERNAL MEDICINE
Payer: COMMERCIAL

## 2021-12-06 ENCOUNTER — ANESTHESIA (OUTPATIENT)
Dept: GASTROENTEROLOGY | Facility: AMBULARY SURGERY CENTER | Age: 76
End: 2021-12-06

## 2021-12-06 ENCOUNTER — ANESTHESIA EVENT (OUTPATIENT)
Dept: GASTROENTEROLOGY | Facility: AMBULARY SURGERY CENTER | Age: 76
End: 2021-12-06

## 2021-12-06 VITALS
HEART RATE: 67 BPM | RESPIRATION RATE: 18 BRPM | TEMPERATURE: 96.4 F | OXYGEN SATURATION: 97 % | DIASTOLIC BLOOD PRESSURE: 64 MMHG | SYSTOLIC BLOOD PRESSURE: 117 MMHG

## 2021-12-06 DIAGNOSIS — K22.70 BARRETT'S ESOPHAGUS WITHOUT DYSPLASIA: ICD-10-CM

## 2021-12-06 DIAGNOSIS — K21.00 GASTROESOPHAGEAL REFLUX DISEASE WITH ESOPHAGITIS WITHOUT HEMORRHAGE: Primary | ICD-10-CM

## 2021-12-06 PROCEDURE — 43239 EGD BIOPSY SINGLE/MULTIPLE: CPT | Performed by: INTERNAL MEDICINE

## 2021-12-06 PROCEDURE — 88305 TISSUE EXAM BY PATHOLOGIST: CPT | Performed by: PATHOLOGY

## 2021-12-06 RX ORDER — LIDOCAINE HYDROCHLORIDE 10 MG/ML
INJECTION, SOLUTION EPIDURAL; INFILTRATION; INTRACAUDAL; PERINEURAL AS NEEDED
Status: DISCONTINUED | OUTPATIENT
Start: 2021-12-06 | End: 2021-12-06

## 2021-12-06 RX ORDER — SODIUM CHLORIDE, SODIUM LACTATE, POTASSIUM CHLORIDE, CALCIUM CHLORIDE 600; 310; 30; 20 MG/100ML; MG/100ML; MG/100ML; MG/100ML
125 INJECTION, SOLUTION INTRAVENOUS CONTINUOUS
Status: DISCONTINUED | OUTPATIENT
Start: 2021-12-06 | End: 2021-12-10 | Stop reason: HOSPADM

## 2021-12-06 RX ORDER — SODIUM CHLORIDE, SODIUM LACTATE, POTASSIUM CHLORIDE, CALCIUM CHLORIDE 600; 310; 30; 20 MG/100ML; MG/100ML; MG/100ML; MG/100ML
20 INJECTION, SOLUTION INTRAVENOUS CONTINUOUS
Status: DISCONTINUED | OUTPATIENT
Start: 2021-12-06 | End: 2021-12-10 | Stop reason: HOSPADM

## 2021-12-06 RX ORDER — SODIUM CHLORIDE, SODIUM LACTATE, POTASSIUM CHLORIDE, CALCIUM CHLORIDE 600; 310; 30; 20 MG/100ML; MG/100ML; MG/100ML; MG/100ML
INJECTION, SOLUTION INTRAVENOUS CONTINUOUS PRN
Status: DISCONTINUED | OUTPATIENT
Start: 2021-12-06 | End: 2021-12-06

## 2021-12-06 RX ORDER — OMEPRAZOLE 20 MG/1
20 CAPSULE, DELAYED RELEASE ORAL DAILY
Qty: 30 CAPSULE | Refills: 2 | Status: SHIPPED | OUTPATIENT
Start: 2021-12-06 | End: 2022-03-30

## 2021-12-06 RX ORDER — PROPOFOL 10 MG/ML
INJECTION, EMULSION INTRAVENOUS AS NEEDED
Status: DISCONTINUED | OUTPATIENT
Start: 2021-12-06 | End: 2021-12-06

## 2021-12-06 RX ADMIN — PROPOFOL 50 MG: 10 INJECTION, EMULSION INTRAVENOUS at 08:46

## 2021-12-06 RX ADMIN — PROPOFOL 100 MG: 10 INJECTION, EMULSION INTRAVENOUS at 08:43

## 2021-12-06 RX ADMIN — SODIUM CHLORIDE, SODIUM LACTATE, POTASSIUM CHLORIDE, AND CALCIUM CHLORIDE: .6; .31; .03; .02 INJECTION, SOLUTION INTRAVENOUS at 08:37

## 2021-12-06 RX ADMIN — LIDOCAINE HYDROCHLORIDE 50 MG: 10 INJECTION, SOLUTION EPIDURAL; INFILTRATION; INTRACAUDAL; PERINEURAL at 08:43

## 2021-12-06 RX ADMIN — PROPOFOL 30 MG: 10 INJECTION, EMULSION INTRAVENOUS at 08:48

## 2021-12-27 DIAGNOSIS — G24.3 CERVICAL DYSTONIA: ICD-10-CM

## 2021-12-27 RX ORDER — CLONAZEPAM 0.5 MG/1
0.5 TABLET ORAL DAILY
Qty: 90 TABLET | Refills: 1 | Status: SHIPPED | OUTPATIENT
Start: 2021-12-27 | End: 2022-03-30

## 2022-01-10 ENCOUNTER — OFFICE VISIT (OUTPATIENT)
Dept: FAMILY MEDICINE CLINIC | Facility: CLINIC | Age: 77
End: 2022-01-10
Payer: COMMERCIAL

## 2022-01-10 VITALS
TEMPERATURE: 95.3 F | HEART RATE: 82 BPM | SYSTOLIC BLOOD PRESSURE: 96 MMHG | RESPIRATION RATE: 16 BRPM | BODY MASS INDEX: 17.75 KG/M2 | WEIGHT: 104 LBS | OXYGEN SATURATION: 98 % | HEIGHT: 64 IN | DIASTOLIC BLOOD PRESSURE: 60 MMHG

## 2022-01-10 DIAGNOSIS — R42 VERTIGO: Primary | ICD-10-CM

## 2022-01-10 PROCEDURE — 1036F TOBACCO NON-USER: CPT | Performed by: INTERNAL MEDICINE

## 2022-01-10 PROCEDURE — 1160F RVW MEDS BY RX/DR IN RCRD: CPT | Performed by: INTERNAL MEDICINE

## 2022-01-10 PROCEDURE — 99213 OFFICE O/P EST LOW 20 MIN: CPT | Performed by: INTERNAL MEDICINE

## 2022-01-10 RX ORDER — MECLIZINE HCL 12.5 MG/1
12.5 TABLET ORAL EVERY 8 HOURS PRN
Qty: 30 TABLET | Refills: 0 | Status: SHIPPED | OUTPATIENT
Start: 2022-01-10

## 2022-01-10 NOTE — PROGRESS NOTES
Assessment/Plan:    1  Vertigo  Comments:  Neuro exam WNL, suspect inner ear as etiology  Will refer to the [address] [place] [address]  Meclizine for symptomatic relief  F/u if not improving, t/c MRI if not improved  Orders:  -     meclizine (ANTIVERT) 12 5 MG tablet; Take 1 tablet (12 5 mg total) by mouth every 8 (eight) hours as needed for dizziness  -     Ambulatory referral to Physical Therapy; Future            There are no Patient Instructions on file for this visit  Return if symptoms worsen or fail to improve  Subjective:      Patient ID: Jonas Doherty is a 64 y o  female  Chief Complaint   Patient presents with    Dizziness    Nausea       She is fine sitting and lying down  When she is walking she can't keep her balance and gets nauseous  There is no ear pain  Has symptoms for past 2 days  Has not had much of an appetite lately, even before this  Symptoms are stable, not progressive but not improving  No URI symptoms or other neurologic symptoms  The following portions of the patient's history were reviewed and updated as appropriate: allergies, current medications, past family history, past medical history, past social history, past surgical history and problem list     Review of Systems   Constitutional: Negative  Respiratory: Negative  Cardiovascular: Negative  Neurological: Positive for dizziness and tremors  Negative for syncope, speech difficulty and headaches           Current Outpatient Medications   Medication Sig Dispense Refill    atorvastatin (LIPITOR) 10 mg tablet Take 1 tablet (10 mg total) by mouth every morning 90 tablet 0    brinzolamide (AZOPT) 1 % ophthalmic suspension Administer to the right eye 2 (two) times a day       levothyroxine 50 mcg tablet Take 1 tablet (50 mcg total) by mouth every morning 90 tablet 3    onabotulinumtoxin A (Botox) 100 units Cervical Dystonia (Patient taking differently: Cervical Dystonia-next dose 12/21) 1 each 11    travoprost (TRAVATAN Z) 0 004 % ophthalmic solution Apply 1 drop to eye daily at bedtime Both eyes      clonazePAM (KlonoPIN) 0 5 mg tablet Take 1 tablet (0 5 mg total) by mouth daily (Patient not taking: Reported on 1/10/2022 ) 90 tablet 1    meclizine (ANTIVERT) 12 5 MG tablet Take 1 tablet (12 5 mg total) by mouth every 8 (eight) hours as needed for dizziness 30 tablet 0    omeprazole (PriLOSEC) 20 mg delayed release capsule Take 1 capsule (20 mg total) by mouth daily (Patient not taking: Reported on 1/10/2022 ) 30 capsule 2     No current facility-administered medications for this visit  Objective:    BP 96/60   Pulse 82   Temp (!) 95 3 °F (35 2 °C)   Resp 16   Ht 5' 4" (1 626 m)   Wt 47 2 kg (104 lb)   SpO2 98%   BMI 17 85 kg/m²        Physical Exam  Constitutional:       Appearance: Normal appearance  She is well-developed  Eyes:      Conjunctiva/sclera: Conjunctivae normal    Neck:      Thyroid: No thyromegaly  Vascular: No JVD  Cardiovascular:      Rate and Rhythm: Normal rate and regular rhythm  Heart sounds: Normal heart sounds  No murmur heard  No friction rub  No gallop  Pulmonary:      Effort: Pulmonary effort is normal       Breath sounds: Normal breath sounds  No wheezing or rales  Abdominal:      General: Bowel sounds are normal  There is no distension  Palpations: Abdomen is soft  Tenderness: There is no abdominal tenderness  Musculoskeletal:      Cervical back: Neck supple  Neurological:      Mental Status: She is alert and oriented to person, place, and time  Cranial Nerves: No cranial nerve deficit  Motor: Tremor present  Coordination: Romberg sign negative   Coordination normal  Finger-Nose-Finger Test and Heel to Ellouise Rodriguez Test normal       Gait: Tandem walk abnormal       Comments: No nystagmus  Tremor unchanged                Kacie Garrido MD

## 2022-01-12 ENCOUNTER — EVALUATION (OUTPATIENT)
Dept: PHYSICAL THERAPY | Facility: CLINIC | Age: 77
End: 2022-01-12
Payer: COMMERCIAL

## 2022-01-12 VITALS — SYSTOLIC BLOOD PRESSURE: 154 MMHG | DIASTOLIC BLOOD PRESSURE: 84 MMHG

## 2022-01-12 DIAGNOSIS — R26.89 BALANCE PROBLEM: Primary | ICD-10-CM

## 2022-01-12 DIAGNOSIS — R42 VERTIGO: ICD-10-CM

## 2022-01-12 NOTE — PROGRESS NOTES
PT Evaluation           Insurance:  AMA/CMS Eval/ Re-eval POC expires [place] Funk #/ Referral # Total units  Start date  Expiration date Extension  Visit limitation? PT only or  PT+OT? Co-Insurance   CMS 22  Pending                                                                         Date               Units:  Used               Authed:  Remaining                     Date               Units:  Used               Authed:  Remaining                           Today's date: 2022  Patient name: Adali Bey  : 1945  MRN: [id number]  Referring provider: Doug Hurley MD  Dx:   Encounter Diagnosis     ICD-10-CM    1  Balance problem  R26 89    2  Vertigo  R42 Ambulatory referral to Physical Therapy    Neuro exam WNL, suspect inner ear as etiology  Will refer to the [address] [place] [address] for symptomatic relief  F/u if not improving, t/c MRI if not improv         Assessment  Assessment details: Patient is a 64 y o  Female who presents to skilled outpatient PT with complaints of dizziness with feelings of "unsteadiness" at times, specifically upon standing from seated position  Most recently dizziness lasted for a duration of 4 days  Oculomotor screen grossly indicated function WNL with minimal (1/10) dizziness only reported with VOR cancel assessment  BPPV positional testing was negative with no nystagmus or symptoms of dizziness reproduced  Feelings of "unsteadiness" reproduced during eyes closed conditions of mCTSIB, indicating that balance may be her primary impairment rather than onset of dizziness  mCTSIB also indicated heavy reliance on visual system to maintain balance  With oculomotor and BPPV assessments cleared, plan to continue balance assessment and intervention in following sessions including DGI/FGA   Pt will benefit from skilled OPPT in order to improve balance and continue to identify and address causes of dizziness/unsteadiness  Patient verbalized understanding of POC  Please contact me if you have any questions or recommendations  Thank you for the referral and the opportunity to share in [address] [place] [address]        Cut off score   All date taken from [facility] Neuro Section or Rehab Measures    DGI:  MDC for Vestibular Disorders: 4 points  [address] for Geriatrics/Community Dwelling Older Adults: 3 Points  Falls risk cut off: <19/24    FGA:  MCID: 4 points  Geriatrics/Community Dwelling Older Adults: </= 22/30 fall risk  Geriatrics/Community Dwelling Older Adults: </= 20/30 unexplained falls in the next 6 months  Parkinsons: </= 18/30 fall risk    mCTSIB (normed on ages 19-56, lower number is less sway or better static balance)  Eyes open firm surface (norm 0 21-0 48)  Eyes closed firm surface (norm 0 48-0 99)  Eyes open foam surface (norm 0 38-0 71)  Eyes closed foam surface (norm 0 70-2 22)    DHI:  0-39: low perception of handicap  40-69: moderate perception of handicap  : severe perception of handicap  > 60: increased risk for falls    Joint Position Error Testing (JPET):  > 4 5 degrees: abnormal joint proprioception        Impairments: abnormal coordination, abnormal gait, impaired balance, lacks appropriate HEP and safety issue  Understanding of Dx/Px/POC: good  Prognosis: good      Goals    Vestibular Short Term Goals:  - Patient will display improved cervical spine STM by 50% to encourage improved AROM during functional tasks  - Patient will be independent with simple HEP  - Patient will demonstrate 10% decrease in symptom severity scoring with independent use of modalities  - Patient will be able to tolerate 30 seconds with eyes closed on foam surface without any loss of balance demonstrating improvement in vestibular system  - Patient will improve with DGI by 3 points per [address] to promote improved safety with dynamic tasks  - Patient will improve FGA score by 4 points per MDC to promote improved safety with dynamic tasks    Vestibular Long Term Goals:  - Patient will display decreased forward head and rounded shoulders to promote improved resting posture and cervical mobility  - Patient will be independent with complex HEP  - Patient will report >= 50% improvement on symptom severity scoring  - Patient will demonstrate ability to perform HT in gait without veering  - Patient will be able to perform 15 minutes of aerobic activity at HR 70% max to facilitate return to normal functional tasks  - Patient will score low risk for falls with DGI test with score of 19/24 or higher per current research data  - Patient will score low risk for falls with FGA test with score of 23/30 or higher per current research data  - Patient will report baseline dizziness of 1/10 or less   - Patient will report 2/10 dizziness or less upon standing from seated position   - Patient will report subjective improvement to 90% or higher to promote return to PLOF  - Patient will complete work related tasks without exacerbation of symptoms in order to maximize function and promote return to work      Plan  Plan details: Continue balance assessment - FGA/DGI, STRONG  Patient would benefit from: Skilled PT  Planned modality interventions: Cryotherapy and Thermotherapy: Hydrocollator Packs  Planned therapy interventions: abdominal trunk stabilization, balance, balance/WB training, breathing training, body mechanics training, coordination, HEP, manual therapy, neuromuscular re-education, patient education, strengthening, therapeutic activities and therapeutic exercises  Frequency: 2x/wk  Duration in weeks: 12 weeks  Plan of Care beginning date: 1/12/2022  Plan of Care expiration date:  4/6/2022  Treatment plan discussed with: patient        Subjective Evaluation    History of Present Illness  Mechanism of injury: Patient reports that Saturday she was unable to stand, with every time she stood feeling like she was going to vomit   She also reports feeling unbalanced when she was walking, wouldn't describe her sensations as spinning dizziness         Dizziness Subjective  How long does dizziness last: "as long as im standing"  How would you describe the dizziness: "off-balance"  Rolling in bed: No  Supine to/from sit: No      Pain  Current pain ratin/10      Social Support  Steps to enter house: 1 flight  Lives in: apartment  Lives with: Alone    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        Objective     Vestibular Objective  Cervical Spine AROM:  - Flexion: WFL no pain  - Extension: WFL no pain  - R Rotation: minimal limitation no pain  - L Rotation: minimal limitation no pain  - R Lateral Flexion: minimal limitation no pain  - L Lateral Flexion: minimal limitation no pain    Integrity Testing  - mVBI: WNL    Oculomotor Screen  - Baseline Symptoms: 0/10  - Baseline Observation: essential tremor  - Gaze Holding Nystagmus: H: Normal and V: Normal Dizziness: 0/10, Observation:   - Spontaneous Nystagmus Room Light: H: Normal and V: Normal Dizziness: 0/10, Observation:   - Smooth Pursuits (central): H: Normal and V: Normal Dizziness: 0/10, Observation:   - Saccades (central): H: Normal and V: Normal Dizziness: 0/10, Observation:   - Near Point Convergence (normal: < 4"/10 cm - central): H: Normal Dizziness: 0/10, Observation:   - VORx1: H: Normal and V: Normal Dizziness: 0/10, Observation: unable to increase speed, testing at patient pace  - VOR Cancel (central): H: Abnormal and V: Normal Dizziness: 1/10, Observation:       -Positional Testing-  -[place]pike-   Right:Normal, Nystagmus  no  Left: Normal,  Nystagmus  no  -Roll Test-   Right: Normal, Nystagmus  no   Left: Normal,  Nystagmus  no        Outcome Measures Initial Eval  2022        mCTSIB  - FTEO (firm)  - FTEC (firm)  - FTEO (foam)  - FTEC (foam)   30 (+) sec  7 24 (+) sec  30 (+) sec 3 45 (+) sec        DGI /24        FGA /30        DHI /100        JPET  degrees Precautions: Benign Central Tremor  Past Medical History:   Diagnosis Date    Colon polyp     COVID-19 02/2021    cough, lost sense of taste for a few days    COVID-19 vaccine series completed     Disease of thyroid gland     hypo    Essential tremor     hands and head    GERD (gastroesophageal reflux disease)     Glaucoma     both eyes    Hyperlipidemia     Osteoporosis     Persistent dry cough     d/t acid reflux    Pre-diabetes     Wears glasses     Wears hearing aid     darien  ears

## 2022-01-13 PROCEDURE — 97162 PT EVAL MOD COMPLEX 30 MIN: CPT | Performed by: PHYSICAL THERAPIST

## 2022-01-14 ENCOUNTER — OFFICE VISIT (OUTPATIENT)
Dept: PHYSICAL THERAPY | Facility: CLINIC | Age: 77
End: 2022-01-14
Payer: COMMERCIAL

## 2022-01-14 DIAGNOSIS — R26.89 BALANCE PROBLEM: ICD-10-CM

## 2022-01-14 DIAGNOSIS — R42 VERTIGO: Primary | ICD-10-CM

## 2022-01-14 PROCEDURE — 97112 NEUROMUSCULAR REEDUCATION: CPT | Performed by: PHYSICAL THERAPIST

## 2022-01-14 NOTE — PROGRESS NOTES
Daily Note     Today's date: 2022  Patient name: Ron Vallejo  : 1945  MRN: [id number]  Referring provider: Weston Vicente MD  Dx:   Encounter Diagnosis     ICD-10-CM    1  Vertigo  R42    2  Balance problem  R26 89                   Subjective: Pt reports mild neck pain after last session after testing  Objective: See treatment diary below    Strong 48/56  FGA /30    Hurdles 9" x5 in // bars x3 laps FWD  Hurdles 9" x5 in // bars x3 laps FWD and BWD  Lat Hurdles 9" x5 in // bars x3 laps   Lat Hurdles 9" x5 in // bars on foam beam x3 laps       HEP:  FT stance balance at counter x30 second holds x3 x7 days/week  Tandem Stance balance at counter x30 second holds x3 x7 days/week  SLS balance at counter x30 second holds x3 x7 days/week    Assessment: Tolerated treatment well with continued balance assessment  Patient demonstrated overall good static balance during STRONG assessment scoring 48/56, with difficulty only noted during narrow Julissa conditions  FGA score of 22/30 categorizes her as a fall risk due to poor dynamic balance during gait - she has greatest difficulty with ambulation with head turns and eyes closed, indicating that she relies heavily on the visual system to maintain her balance  This is consistent with mCTSIB findings on initial evaluation  Throughout testing and treatment today, pt requires frequent reminder to slow movements to improve control and balance  Fair carryover of slowed movements until new exercise initiated  HEP was reviewed and pt was provided with print out of each exercise - pt verbalized good understanding  With HEP focused on static balance, time in therapy will continue to focus on dynamic balance interventions  Pt will benefit from skilled OPPT in order to address her deficits in dynamic and narrow support balance   PT will continue to assess if improvements in balance carryover to pt reports of decreased bouts of "feeling unsteady "       Plan: Continue per plan of care        Outcome Measures Initial Eval  1/13/2022  Daily Note  1/14/2022           mCTSIB  - FTEO (firm)  - FTEC (firm)  - FTEO (foam)  - FTEC (foam)    30 (+) sec  7 24 (+) sec  30(+) sec  3 45 (+) sec             Berg /56 48/56           FGA /30 22/30           DHI /100             JPET  degrees

## 2022-01-19 ENCOUNTER — OFFICE VISIT (OUTPATIENT)
Dept: PHYSICAL THERAPY | Facility: CLINIC | Age: 77
End: 2022-01-19
Payer: COMMERCIAL

## 2022-01-19 DIAGNOSIS — R26.89 BALANCE PROBLEM: ICD-10-CM

## 2022-01-19 DIAGNOSIS — R42 VERTIGO: Primary | ICD-10-CM

## 2022-01-19 PROCEDURE — 97112 NEUROMUSCULAR REEDUCATION: CPT

## 2022-01-19 NOTE — PROGRESS NOTES
Daily Note     Today's date: 2022  Patient name: Mlaly Rocha  : 1945  MRN: [id number]  Referring provider: Boogie Hoang MD  Dx:   Encounter Diagnosis     ICD-10-CM    1  Vertigo  R42    2  Balance problem  R26 89                   Subjective: Pt reports improvement with ability to run errands without dizziness  Patient has been practicing HEP diligently  Patient thinks she may return to work next week  Objective: See treatment diary below    NMR:  - Hurdles 9" x4 in // bars x 4 laps FWD  - Hurdles 9" x4 in // bars x 2 laps BWD  - 1 Jazmyn Fwd/back 20x (9")  - Lat Hurdles 9" x4 in // bars x 3 laps   - Tandem ambulation 10' x 8  - Tandem stance, 20" x 2  - FTEC firm, 30" x 2  - FTEO foam, 60"  - FTEC foam, 20" x 2  - A-P sway on foam, 20x   - Ambulation Fwd EC, Bkwd EO, 4 laps x 40 feet  - [place] 1 lap x 40 feet  - STS 10x EC on firm, 10x EO on foam, 10x EC on foam         Assessment: Patient tolerated session well today with focus on dynamic exercise addressing vision deprivation, narrow base of support, and compliant surfaces  Patient demonstrated significant R hip circumduction in jazmyn negotiation but was able to correct with visual cueing  Patient's essential tremor may contribute to instability in static stance but patient was able to elicit ankle or stepping strategy to independently recover  Notable decrease in performance with eyes closed in static and dynamic positions  Patient will benefit from continued skilled therapy to improve balance and decrease feelings of unsteadiness/dizziness particularly during times when vision is deprived in order to return to PLOF  Plan: Continue per plan of care           HEP:  FT stance balance at counter x30 second holds x3 x7 days/week  Tandem Stance balance at counter x30 second holds x3 x7 days/week  SLS balance at counter x30 second holds x3 x7 days/week    Outcome Measures Initial Eval  2022  Daily Note  2022         mCTSIB  - FTEO (firm)  - FTEC (firm)  - FTEO (foam)  - FTEC (foam)    30 (+) sec  7 24 (+) sec  30(+) sec  3 45 (+) sec             Berg /56 48/56           FGA /30 22/30           DHI /100             JPET  degrees            Precautions: Benign Central Tremor  Past Medical History:   Diagnosis Date    Colon polyp     COVID-19 02/2021    cough, lost sense of taste for a few days    COVID-19 vaccine series completed     Disease of thyroid gland     hypo    Essential tremor     hands and head    GERD (gastroesophageal reflux disease)     Glaucoma     both eyes    Hyperlipidemia     Osteoporosis     Persistent dry cough     d/t acid reflux    Pre-diabetes     Wears glasses     Wears hearing aid     darien  ears

## 2022-01-21 ENCOUNTER — OFFICE VISIT (OUTPATIENT)
Dept: PHYSICAL THERAPY | Facility: CLINIC | Age: 77
End: 2022-01-21
Payer: COMMERCIAL

## 2022-01-21 DIAGNOSIS — R42 VERTIGO: Primary | ICD-10-CM

## 2022-01-21 DIAGNOSIS — R26.89 BALANCE PROBLEM: ICD-10-CM

## 2022-01-21 PROCEDURE — 97112 NEUROMUSCULAR REEDUCATION: CPT

## 2022-01-21 NOTE — PROGRESS NOTES
Daily Note     Today's date: 2022  Patient name: Soha Kemp  : 1945  MRN: [id number]  Referring provider: Jolly Smith MD  Dx:   Encounter Diagnosis     ICD-10-CM    1  Vertigo  R42    2  Balance problem  R26 89                   Subjective: Pt reports decreased dizziness with eyes open but still feels off balance with eyes closed  Objective: See treatment diary below    NMR:  - Hurdles 9" x4 x 3 laps FWD  - Hurdles 9" x4 x 3 laps LAT  - Tandem ambulation 10' x 8  - FTEC firm, 60"  - FTEO foam, 60"  - FTEC foam, 45"  - Step ups to foam, 20x   - Ambulation Fwd EC, 8 x 15'  - Ambulation Bkwd EO, 6 x 15'  - STS 15x EC on firm, 15x EO on foam  - Head turns EC on firm, 20x H/V  - Fwd/backward ambulation with ball toss, 3 min         Assessment: Patient tolerated session well today with continued focus on static and dynamic balance  Patient is most challenged with eyes closed and often required ankle or stepping strategy to recover  Patient is unable to complete > 5 steps in tandem without stepping strategy  Noted improved stability with hurdles and decreased circumduction  Patient plans on returning to work on Monday; educated pt on monitoring symptoms and avoiding situations with eyes closed  Patient verbalized understanding and agreement  Patient will benefit from continued skilled therapy to improve balance and decrease dizziness symptoms for full return to PLOF  Plan: Continue per plan of care           HEP:  FT stance balance at counter x30 second holds x3 x7 days/week  Tandem Stance balance at counter x30 second holds x3 x7 days/week  SLS balance at counter x30 second holds x3 x7 days/week    Outcome Measures Initial Eval  2022  Daily Note  2022           mCTSIB  - FTEO (firm)  - FTEC (firm)  - FTEO (foam)  - FTEC (foam)    30 (+) sec  7 24 (+) sec  30(+) sec  3 45 (+) sec              /56  48/56           FGA /30  22/30           DHI /100             JPET  degrees Precautions: Benign Central Tremor  Past Medical History:   Diagnosis Date    Colon polyp     COVID-19 02/2021    cough, lost sense of taste for a few days    COVID-19 vaccine series completed     Disease of thyroid gland     hypo    Essential tremor     hands and head    GERD (gastroesophageal reflux disease)     Glaucoma     both eyes    Hyperlipidemia     Osteoporosis     Persistent dry cough     d/t acid reflux    Pre-diabetes     Wears glasses     Wears hearing aid     darien  ears No

## 2022-01-25 DIAGNOSIS — E78.2 MIXED HYPERLIPIDEMIA: ICD-10-CM

## 2022-01-25 RX ORDER — ATORVASTATIN CALCIUM 10 MG/1
TABLET, FILM COATED ORAL
Qty: 90 TABLET | Refills: 0 | Status: SHIPPED | OUTPATIENT
Start: 2022-01-25 | End: 2022-04-29 | Stop reason: SDUPTHER

## 2022-01-26 ENCOUNTER — OFFICE VISIT (OUTPATIENT)
Dept: PHYSICAL THERAPY | Facility: CLINIC | Age: 77
End: 2022-01-26
Payer: COMMERCIAL

## 2022-01-26 ENCOUNTER — APPOINTMENT (OUTPATIENT)
Dept: PHYSICAL THERAPY | Facility: CLINIC | Age: 77
End: 2022-01-26
Payer: COMMERCIAL

## 2022-01-26 DIAGNOSIS — R42 VERTIGO: Primary | ICD-10-CM

## 2022-01-26 DIAGNOSIS — R26.89 BALANCE PROBLEM: ICD-10-CM

## 2022-01-26 PROCEDURE — 97112 NEUROMUSCULAR REEDUCATION: CPT | Performed by: PHYSICAL THERAPIST

## 2022-01-26 NOTE — PROGRESS NOTES
Daily Note     Today's date: 2022  Patient name: Gavino Zepeda  : 1945  MRN: [id number]  Referring provider: Jono Sullivan MD  Dx:   Encounter Diagnosis     ICD-10-CM    1  Vertigo  R42    2  Balance problem  R26 89                   Subjective: Pt reports improvement with her tandem ambulation at home, she was able to complete 7 steps without UE assistance      Objective: See treatment diary below    NMR:  - Tandem ambulation: 10 laps x 10 laps  - Mini-squats on foam: 20x, CGA  - Sidestepping on foam w/ cone taps: 5 laps x 2 beams, CGA  - Multi-direction hurdles: 10 laps CW/CCW, w/ foam  - BOSU Step ups: 20x  - FTEC w/ foam: 30 sec, 3x  - FAEO on BOSU: 30 sec, 3x        Assessment: Patient tolerated treatment well  Frequent CGA and gait belt maintained for all exercises due to increased instability with unsteady surfaces  She demonstrates poor posterior stepping strategy, relying on UE support to regain balance  Discussed with patient importance of stepping strategy and need to continue practicing for improved functional safety  Patient will benefit from continued skilled therapy to improve balance and decrease dizziness symptoms for full return to PLOF  Plan: Continue per plan of care           HEP:  FT stance balance at counter x30 second holds x3 x7 days/week  Tandem Stance balance at counter x30 second holds x3 x7 days/week  SLS balance at counter x30 second holds x3 x7 days/week    Outcome Measures Initial Eval  2022  Daily Note  2022           mCTSIB  - FTEO (firm)  - FTEC (firm)  - FTEO (foam)  - FTEC (foam)    30 (+) sec  7 24 (+) sec  30(+) sec  3 45 (+) sec             Sims /56  48/56           FGA /30  /30           DHI /100             JPET  degrees            Precautions: Benign Central Tremor  Past Medical History:   Diagnosis Date    Colon polyp     COVID-19 2021    cough, lost sense of taste for a few days    COVID-19 vaccine series completed     Disease of thyroid gland     hypo    Essential tremor     hands and head    GERD (gastroesophageal reflux disease)     Glaucoma     both eyes    Hyperlipidemia     Osteoporosis     Persistent dry cough     d/t acid reflux    Pre-diabetes     Wears glasses     Wears hearing aid     darien  ears

## 2022-01-27 ENCOUNTER — TELEPHONE (OUTPATIENT)
Dept: NEUROLOGY | Facility: CLINIC | Age: 77
End: 2022-01-27

## 2022-01-28 ENCOUNTER — OFFICE VISIT (OUTPATIENT)
Dept: PHYSICAL THERAPY | Facility: CLINIC | Age: 77
End: 2022-01-28
Payer: COMMERCIAL

## 2022-01-28 DIAGNOSIS — R42 VERTIGO: Primary | ICD-10-CM

## 2022-01-28 DIAGNOSIS — R26.89 BALANCE PROBLEM: ICD-10-CM

## 2022-01-28 PROCEDURE — 97112 NEUROMUSCULAR REEDUCATION: CPT

## 2022-01-28 NOTE — PROGRESS NOTES
Daily Note         Insurance:  AMA/CMS Eval/ Re-eval POC expires Rajan Mar #/ Referral # Total units  Start date  Expiration date Extension  Visit limitation? PT only or  PT+OT? Co-Insurance   CMS 22  [id number] 99 units   BOMN PT $10                                                                  Date          Units:  Used eval 3 3 3 3 3         Authed: 99 units  Remaining  99 96 93 90 87 84              Date               Units:  Used               Authed:  Remaining                       Today's date: 2022  Patient name: Ryan Florian  : 1945  MRN: [id number]  Referring provider: Stefano Anna MD  Dx:   Encounter Diagnosis     ICD-10-CM    1  Vertigo  R42    2  Balance problem  R26 89                   Subjective: Pt reports she is still working on standing in tandem  She got dizzy ("I felt off balance") while bowling  Objective: See treatment diary below    NMR:  - Tandem ambulation: 20' x 2  - Mini-squats on foam: 25x, CGA  - Marching on foam with 3" hold, 25x BLE  - Sidestepping on foam w/ cone taps: 5 laps x 2 beams, CGA  - Four Square hurdles: 20 laps CCW  - BOSU Step ups fwd: 20x BLE  - BOSU Step ups lat: 20x BLE  - FTEC w/ foam: 30 sec, 3x  - Backward ambulation with anterior resistance (red)         Assessment: Patient tolerated treatment well today with continued dynamic balance interventions  Patient better able to elicit stepping strategy compared to previous session  Continues to be challenged with tandem ambulation with inability to complete true tandem  Educated patient on importance of stopping to reset self when experiencing LOB rather than continuing movement  Patient had RAO with backward ambulation with resistance which resolved with standing rest  Patient will benefit from continued skilled therapy to improve balance and reduce fall risk for return to PLOF  Plan: Continue per plan of care           HEP:  FT stance balance at counter x30 second holds x3 x7 days/week  Tandem Stance balance at counter x30 second holds x3 x7 days/week  SLS balance at counter x30 second holds x3 x7 days/week    Outcome Measures Initial Eval  1/13/2022  Daily Note  1/14/2022           mCTSIB  - FTEO (firm)  - FTEC (firm)  - FTEO (foam)  - FTEC (foam)    30 (+) sec  7 24 (+) sec  30(+) sec  3 45 (+) sec             Sims /56  48/56           FGA /30  22/30           DHI /100             JPET  degrees            Precautions: Benign Central Tremor  Past Medical History:   Diagnosis Date    Colon polyp     COVID-19 02/2021    cough, lost sense of taste for a few days    COVID-19 vaccine series completed     Disease of thyroid gland     hypo    Essential tremor     hands and head    GERD (gastroesophageal reflux disease)     Glaucoma     both eyes    Hyperlipidemia     Osteoporosis     Persistent dry cough     d/t acid reflux    Pre-diabetes     Wears glasses     Wears hearing aid     darien  ears

## 2022-02-01 NOTE — TELEPHONE ENCOUNTER
Call [facility] spoke with [facility] the pharmacist,Verbal Rx information was provided for Botox, as per [facility]  Please allowed 2-5 business days for benefits verification and patient authorization for delivery  I will check back in one week

## 2022-02-02 ENCOUNTER — OFFICE VISIT (OUTPATIENT)
Dept: PHYSICAL THERAPY | Facility: CLINIC | Age: 77
End: 2022-02-02
Payer: COMMERCIAL

## 2022-02-02 DIAGNOSIS — R26.89 BALANCE PROBLEM: ICD-10-CM

## 2022-02-02 DIAGNOSIS — R42 VERTIGO: Primary | ICD-10-CM

## 2022-02-02 PROCEDURE — 97112 NEUROMUSCULAR REEDUCATION: CPT | Performed by: PHYSICAL THERAPIST

## 2022-02-02 NOTE — PROGRESS NOTES
Daily Note         Insurance:  AMA/CMS Eval/ Re-eval POC expires Carissa Ziegler #/ Referral # Total units  Start date  Expiration date Extension  Visit limitation? PT only or  PT+OT? Co-Insurance   CMS 22  [id number] 99 units   BOMN PT $10                                                                  Date         Units:  Used eval 3 3 3 3 3 3        Authed: 99 units  Remaining  99 96 93 90 87 84 81             Date               Units:  Used               Authed:  Remaining                       Today's date: 2022  Patient name: Ron Vallejo  : 1945  MRN: [id number]  Referring provider: Weston Vicente MD  Dx:   Encounter Diagnosis     ICD-10-CM    1  Vertigo  R42    2  Balance problem  R26 89                   Subjective: Pt reports she was practicing tandem walking at home and was able to complete 11 steps in a row  Objective: See treatment diary below    NMR:    - Mini-squats on foam: 25x, CGA  - Marching on foam with 3" hold, 25x BLE  - FTEO w/ foam 30 sec  - FTEC w/ foam: 30 sec, 3x  - BOSU Step ups fwd: 20x BLE  - BOSU Step ups lat: 20x BLE  - Tandem ambulation: 20' x 2  - Sidestepping on foam w/ over 9" hurdles, 5 laps x 2 beams, CGA  - Four Square hurdles with counter weight kettle bell in hand: 20 laps CCW  - 9" jazmyn FWD/BWD to cone taps with red band post resistance  Assessment: Patient tolerated treatment well today with continued dynamic balance interventions  Significant improvements in vestibular system use to maintain balance with completion of 30 seconds of both foam conditions of mCTSIB today - mild sway noted during EC, though no LOB during 3 trials  Appropriate ankle and hip strategies noted throughout compliant surface activities today resulting in no LoB  Improvements in narrow ARY balance demonstrated via reduced number of corrective lateral steps during tandem ambulation today   Pt reports improvements in stability throughout her daily activities with no recent episodes of unsteadiness to the level that brought her in to pt  With noted improvements, plan for D/C at next visit - provide pt with and review comprehensive HEP  Plan: Continue per plan of care           HEP:  FT stance balance at counter x30 second holds x3 x7 days/week  Tandem Stance balance at counter x30 second holds x3 x7 days/week  SLS balance at counter x30 second holds x3 x7 days/week    Outcome Measures Initial Eval  1/13/2022  Daily Note  1/14/2022           mCTSIB  - FTEO (firm)  - FTEC (firm)  - FTEO (foam)  - FTEC (foam)    30 (+) sec  7 24 (+) sec  30(+) sec  3 45 (+) sec             Berg /56  48/56           FGA /30 22/30           DHI /100             JPET  degrees            Precautions: Benign Central Tremor  Past Medical History:   Diagnosis Date    Colon polyp     COVID-19 02/2021    cough, lost sense of taste for a few days    COVID-19 vaccine series completed     Disease of thyroid gland     hypo    Essential tremor     hands and head    GERD (gastroesophageal reflux disease)     Glaucoma     both eyes    Hyperlipidemia     Osteoporosis     Persistent dry cough     d/t acid reflux    Pre-diabetes     Wears glasses     Wears hearing aid     darien  ears

## 2022-02-02 NOTE — TELEPHONE ENCOUNTER
Botox:  200 units is schedule for delivery on 2/9/2022      VIA [facility]    Please inform of delivery

## 2022-02-04 ENCOUNTER — OFFICE VISIT (OUTPATIENT)
Dept: PHYSICAL THERAPY | Facility: CLINIC | Age: 77
End: 2022-02-04
Payer: COMMERCIAL

## 2022-02-04 DIAGNOSIS — R42 VERTIGO: Primary | ICD-10-CM

## 2022-02-04 DIAGNOSIS — R26.89 BALANCE PROBLEM: ICD-10-CM

## 2022-02-04 PROCEDURE — 97112 NEUROMUSCULAR REEDUCATION: CPT

## 2022-02-04 PROCEDURE — 97530 THERAPEUTIC ACTIVITIES: CPT

## 2022-02-04 NOTE — PROGRESS NOTES
Daily Note         Insurance:  AMA/CMS Eval/ Re-eval POC expires Emerson Huggins #/ Referral # Total units  Start date  Expiration date Extension  Visit limitation? PT only or  PT+OT? Co-Insurance   CMS 22  [id number] 99 units   BOMN PT $10                                                                  Date        Units:  Used eval 3 3 3 3 3 3 3       Authed: 99 units  Remaining  99 96 93 90 87 84 81 78            Date               Units:  Used               Authed:  Remaining                       Today's date: 2022  Patient name: Radha Neely  : 1945  MRN: [id number]  Referring provider: Era Wilson MD  Dx:   Encounter Diagnosis     ICD-10-CM    1  Vertigo  R42    2  Balance problem  R26 89                   Subjective: Pt reports she was practicing tandem walking at home and was able to complete 11 steps in a row  Objective: See treatment diary below    TA:  - FGA (see below)  - DGI (see below)    HEP (Updated 22)  Access Code: [email or web address]  URL: [email or web address]  Date: 2022  Prepared by: Eddy Mcgarry    Exercises  · Tandem Walking with Counter Support - 1 x daily - 7 x weekly  · Backward Walking with Counter Support - 1 x daily - 7 x weekly  · Forward and Backward Walking with Eyes Closed and Counter Support - 1 x daily - 7 x weekly  · Tandem Stance - 1 x daily - 7 x weekly - 3 sets - 30 sec hold  · Single Leg Stance - 1 x daily - 7 x weekly - 3 sets - 30 sec hold  · Feet Together Eyes Closed - 1 x daily - 7 x weekly - 3 sets - 30 sec hold    NMR:  - Step ups to river rocks (medium, large) x 20 each leg  - Tandem ambulation 6 cycles x 10 ft  - BOSU Step ups fwd: 20x BLE    - Mini-squats on foam: 25x, CGA  - Marching on foam with 3" hold, 25x BLE  - FTEO w/ foam 30 sec  - FTEC w/ foam: 30 sec, 3x    - BOSU Step ups lat: 20x BLE  - Tandem ambulation: 20' x 2  - Sidestepping on foam w/ over 9" hurdles, 5 laps x 2 beams, CGA  - Four Square hurdles with counter weight kettle bell in hand: 20 laps CCW  - 9" jazmyn FWD/BWD to cone taps with red band post resistance  Assessment: Patient tolerated treatment well today with conclusion of higher level balance training with plan to d/c this date  Performed outcome measure testing today to assess dynamic balance, with patient performing within age-matched normative values for the FGA and DGI  She is considered low fall risk per cutoff scores for the two aforementioned tests  Discussed plan to d/c today with HEP; she verbalized good understanding  Patient educated on and provided handout for comprehensive HEP; she demonstrated good understanding  She has met 4 short term goals and 10 long term goals  Patient will no longer benefit from skilled OPPT services at this time      Vestibular Short Term Goals:  - Patient will display improved cervical spine STM by 50% to encourage improved AROM during functional tasks - MET  - Patient will be independent with simple HEP - MET  - Patient will demonstrate 10% decrease in symptom severity scoring with independent use of modalities - MET  - Patient will be able to tolerate 30 seconds with eyes closed on foam surface without any loss of balance demonstrating improvement in vestibular system - MET  - Patient will improve with DGI by 3 points [facility] to promote improved safety with dynamic tasks - NOT MET  - Patient will improve FGA score by 4 points per MDC to promote improved safety with dynamic tasks - NOT MET    Vestibular Long Term Goals:  - Patient will display decreased forward head and rounded shoulders to promote improved resting posture and cervical mobility - MET  - Patient will be independent with complex HEP - MET  - Patient will report >= 50% improvement on symptom severity scoring - MET   - Patient will demonstrate ability to perform HT in gait without veering - NOT MET  - Patient will be able to perform 15 minutes of aerobic activity at HR 70% max to facilitate return to normal functional tasks - MET  - Patient will score low risk for falls with DGI test with score of 19/24 or higher per current research data - MET  - Patient will score low risk for falls with FGA test with score of 23/30 or higher per current research data - MET  - Patient will report baseline dizziness of 1/10 or less - MET  - Patient will report 2/10 dizziness or less upon standing from seated position - MET  - Patient will report subjective improvement to 90% or higher to promote return to PLOF - MET  - Patient will complete work related tasks without exacerbation of symptoms in order to maximize function and promote return to work - MET       Plan: Continue per plan of care             Outcome Measures Initial Eval  1/13/2022  Daily Note  1/14/2022  DC  2/4/22         mCTSIB  - FTEO (firm)  - FTEC (firm)  - FTEO (foam)  - FTEC (foam)    30 (+) sec  7 24 (+) sec  30(+) sec  3 45 (+) sec             Sims /56  48/56           DGI   21/24      FGA /30 22/30 24/30         DHI /100             JPET  degrees            Precautions: Benign Central Tremor  Past Medical History:   Diagnosis Date    Colon polyp     COVID-19 02/2021    cough, lost sense of taste for a few days    COVID-19 vaccine series completed     Disease of thyroid gland     hypo    Essential tremor     hands and head    GERD (gastroesophageal reflux disease)     Glaucoma     both eyes    Hyperlipidemia     Osteoporosis     Persistent dry cough     d/t acid reflux    Pre-diabetes     Wears glasses     Wears hearing aid     darien  ears

## 2022-02-09 NOTE — TELEPHONE ENCOUNTER
Botox number of units: 200  Botox quantity: 1  Arrived at what location: [email or web address]   Botox at Correct Administering Location: yes  NDC number: [id number]  Lot number: [id number]  Expiration Date: 11/30/24  Appt notes indicate correct medication: yes

## 2022-03-07 ENCOUNTER — PROCEDURE VISIT (OUTPATIENT)
Dept: NEUROLOGY | Facility: CLINIC | Age: 77
End: 2022-03-07
Payer: COMMERCIAL

## 2022-03-07 VITALS — DIASTOLIC BLOOD PRESSURE: 78 MMHG | SYSTOLIC BLOOD PRESSURE: 128 MMHG | TEMPERATURE: 97.6 F

## 2022-03-07 DIAGNOSIS — G24.3 CERVICAL DYSTONIA: Primary | ICD-10-CM

## 2022-03-07 PROCEDURE — 64616 CHEMODENERV MUSC NECK DYSTON: CPT | Performed by: PSYCHIATRY & NEUROLOGY

## 2022-03-07 NOTE — PROGRESS NOTES
Universal Protocol   Consent: Written consent obtained  Consent given by: patient        Chemodenervation     Date/Time 3/7/2022 6:56 AM     Performed by  Jose Martin Bermudez MD     Authorized by Jose Martin Bermudez MD        Pre-procedure details      Prepped With: Alcohol     Anesthesia  (see MAR for exact dosages): Anesthesia method:  None   Procedure details     Position:  Upright   Botox     Brand:  Botox    mL's of preservative free sterile saline:  1    Final Concentration per CC:  100 units    Needle gauge: 27G  Procedures     Botox Procedures: cervical dystonia      Indications: spasmodic torticollis      Date of last injection:  8/23/2021   Post-procedure details      Chemodenervation:  Neck, excluding muscles of the larynx    Neck Muscle Location[de-identified]  Bilateral neck muscle   Comments             Ms Tim Tripp is a 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 who presents for Botox injections  Review of history reveals, head tremor started in the mid 1970s and she was initially diagnosed with ET around 1976  She reports today hand tremor was present prior to the head tremor (L>R causing her to switch dominant hands)  Medications including propranolol, primidone, topiramate, gabapentin were ineffective in controlling tremor  In 2014, she was diagnosed with cervical dystonia and underwent 3 rounds of Botox treatment (1st treatment led to head droop requiring the use of a soft cervical collar but tremor control, 2nd ineffective, 3rd seemed to exacerbate the head tremor)  Klonopin at night helped reduce the tremor to allow for sleep  Zonisamide used for hand tremor  Interval history:  Rene Brightly the injections she had been doing well  It has been worse since she is so far for injection  She has increase posterior neck pain  This improves if lying down  Over the  Past month pain has been so severe she has been wearing a soft collar which helps  She will hear cracking in her neck     Head tremor is most bothersome  Head tremor dampens after injections and pain improves    Lasts about 3 months      Exam:  Moderate yes-yes head tremor with mild right laterocllis      Left trapezius hypertrophy     Injections:  RIGHT Splenious capitis: 30 units x2 (60 units total)   RIGHT levator scapuli : 25 units (higher location)  LEFT Splenious capitis: 50 units   LEFT cervical trapezius: 25 units   LEFT shoulder trapezius: total 40 units (15 unitsx 1 25 units x1 at the transition between cervical and shoulder)     Total injected: 200 units   Discarded: 0

## 2022-03-16 LAB
ALBUMIN SERPL-MCNC: 4.7 G/DL (ref 3.6–5.1)
ALBUMIN/GLOB SERPL: 1.6 (CALC) (ref 1–2.5)
ALP SERPL-CCNC: 71 U/L (ref 37–153)
ALT SERPL-CCNC: 15 U/L (ref 6–29)
AST SERPL-CCNC: 20 U/L (ref 10–35)
BASOPHILS # BLD AUTO: 58 CELLS/UL (ref 0–200)
BASOPHILS NFR BLD AUTO: 0.8 %
BILIRUB SERPL-MCNC: 0.7 MG/DL (ref 0.2–1.2)
BUN SERPL-MCNC: 14 MG/DL (ref 7–25)
BUN/CREAT SERPL: 14 (CALC) (ref 6–22)
CALCIUM SERPL-MCNC: 9.8 MG/DL (ref 8.6–10.4)
CHLORIDE SERPL-SCNC: 97 MMOL/L (ref 98–110)
CHOLEST SERPL-MCNC: 174 MG/DL
CHOLEST/HDLC SERPL: 2.5 (CALC)
CO2 SERPL-SCNC: 26 MMOL/L (ref 20–32)
CREAT SERPL-MCNC: 1.03 MG/DL (ref 0.6–0.93)
EOSINOPHIL # BLD AUTO: 212 CELLS/UL (ref 15–500)
EOSINOPHIL NFR BLD AUTO: 2.9 %
ERYTHROCYTE [DISTWIDTH] IN BLOOD BY AUTOMATED COUNT: 11.9 % (ref 11–15)
EST. AVERAGE GLUCOSE BLD GHB EST-MCNC: 123 MG/DL
EST. AVERAGE GLUCOSE BLD GHB EST-SCNC: 6.8 MMOL/L
GLOBULIN SER CALC-MCNC: 2.9 G/DL (CALC) (ref 1.9–3.7)
GLUCOSE SERPL-MCNC: 96 MG/DL (ref 65–99)
HBA1C MFR BLD: 5.9 % OF TOTAL HGB
HCT VFR BLD AUTO: 37.7 % (ref 35–45)
HDLC SERPL-MCNC: 70 MG/DL
HGB BLD-MCNC: 12.6 G/DL (ref 11.7–15.5)
LDLC SERPL CALC-MCNC: 84 MG/DL (CALC)
LYMPHOCYTES # BLD AUTO: 2110 CELLS/UL (ref 850–3900)
LYMPHOCYTES NFR BLD AUTO: 28.9 %
MCH RBC QN AUTO: 30.5 PG (ref 27–33)
MCHC RBC AUTO-ENTMCNC: 33.4 G/DL (ref 32–36)
MCV RBC AUTO: 91.3 FL (ref 80–100)
MONOCYTES # BLD AUTO: 730 CELLS/UL (ref 200–950)
MONOCYTES NFR BLD AUTO: 10 %
NEUTROPHILS # BLD AUTO: 4190 CELLS/UL (ref 1500–7800)
NEUTROPHILS NFR BLD AUTO: 57.4 %
NONHDLC SERPL-MCNC: 104 MG/DL (CALC)
PLATELET # BLD AUTO: 303 THOUSAND/UL (ref 140–400)
PMV BLD REES-ECKER: 9.1 FL (ref 7.5–12.5)
POTASSIUM SERPL-SCNC: 4.2 MMOL/L (ref 3.5–5.3)
PROT SERPL-MCNC: 7.6 G/DL (ref 6.1–8.1)
RBC # BLD AUTO: 4.13 MILLION/UL (ref 3.8–5.1)
SL AMB EGFR AFRICAN AMERICAN: 60 ML/MIN/1.73M2
SL AMB EGFR NON AFRICAN AMERICAN: 52 ML/MIN/1.73M2
SODIUM SERPL-SCNC: 133 MMOL/L (ref 135–146)
TRIGL SERPL-MCNC: 107 MG/DL
TSH SERPL-ACNC: 1.73 MIU/L (ref 0.4–4.5)
WBC # BLD AUTO: 7.3 THOUSAND/UL (ref 3.8–10.8)

## 2022-03-24 NOTE — PROGRESS NOTES
Assessment/Plan:    1  Mixed hyperlipidemia  Assessment & Plan:  Reviewed labs with patient and lipids are favorable, continue atorvastatin as ordered  Discussed need for low fat diet and continued exercise  Orders:  -     CBC and differential; Future; Expected date: 09/30/2022  -     Comprehensive metabolic panel; Future; Expected date: 09/30/2022  -     Lipid panel; Future; Expected date: 09/30/2022  -     CBC and differential  -     Comprehensive metabolic panel  -     Lipid panel    2  Hypothyroidism, unspecified type  Assessment & Plan:  Reviewed labs with patient and TSH is within goal, continue levothyroxine at current dosage  Orders:  -     TSH, 3rd generation with Free T4 reflex; Future; Expected date: 09/30/2022  -     TSH, 3rd generation with Free T4 reflex    3  Elevated glucose  Assessment & Plan:  Control has worsened somewhat but continues to be in prediabetic range and will continue to follow  Encourage low carb diet and continue exercise  Orders:  -     Hemoglobin A1c (w/out EAG); Future; Expected date: 09/30/2022  -     Hemoglobin A1c (w/out EAG)    4  Hoarseness  Comments:  Exam is normal   Will refer to ENT as requested  Orders:  -     Ambulatory Referral to Otolaryngology; Future    5  Pulmonary nodule  Comments:  Due for repeat CT and given dyspnea also this was ordered  Will f/u with patient after results  Orders:  -     CT chest wo contrast; Future; Expected date: 03/30/2022          There are no Patient Instructions on file for this visit  Return in about 6 months (around 9/30/2022) for 1/2 hour follow up and AWV  Subjective:      Patient ID: Radha Neely is a 64 y o  female  Chief Complaint   Patient presents with    Follow-up     6 month f/u nm lpn    Shortness of Breath     started about 3 months ago, gets out of breathe very quickly       Here for follow up of multiple issues  She has had shortness of breath for the past 3 months or more   She did not want to talk about this the last time she was here  This has been with activity only  She denies chest pain  She has hoarseness and voice change and feels this is more of an ENT problem  She denies family history of CAD and does not want to see the cardiologist right now  She is happy about her labs and we reviewed these with her  She has cut back on her free water and is glad her sodium is better  The following portions of the patient's history were reviewed and updated as appropriate: allergies, current medications, past family history, past medical history, past social history, past surgical history and problem list     Review of Systems   Constitutional: Negative  Respiratory: Positive for shortness of breath  Negative for cough and wheezing  Cardiovascular: Negative  Negative for chest pain, palpitations and leg swelling  Endocrine: Negative  Current Outpatient Medications   Medication Sig Dispense Refill    atorvastatin (LIPITOR) 10 mg tablet take 1 tablet by mouth every morning 90 tablet 0    brinzolamide (AZOPT) 1 % ophthalmic suspension Administer to the right eye 2 (two) times a day       levothyroxine 50 mcg tablet Take 1 tablet (50 mcg total) by mouth every morning 90 tablet 3    meclizine (ANTIVERT) 12 5 MG tablet Take 1 tablet (12 5 mg total) by mouth every 8 (eight) hours as needed for dizziness 30 tablet 0    onabotulinumtoxin A (Botox) 100 units Cervical Dystonia (Patient taking differently: Cervical Dystonia-next dose 12/21) 1 each 11    travoprost (TRAVATAN Z) 0 004 % ophthalmic solution Apply 1 drop to eye daily at bedtime Both eyes       No current facility-administered medications for this visit  Objective:    /82   Pulse 87   Temp 97 8 °F (36 6 °C)   Resp 16   Ht 5' 4" (1 626 m)   Wt 47 4 kg (104 lb 9 6 oz)   SpO2 99%   BMI 17 95 kg/m²        Physical Exam  Constitutional:       Appearance: She is well-developed     Eyes:      Conjunctiva/sclera: Conjunctivae normal    Neck:      Thyroid: No thyromegaly  Vascular: No JVD  Cardiovascular:      Rate and Rhythm: Normal rate and regular rhythm  Heart sounds: Normal heart sounds  No murmur heard  No friction rub  No gallop  Pulmonary:      Effort: Pulmonary effort is normal       Breath sounds: Normal breath sounds  No wheezing or rales  Abdominal:      General: Bowel sounds are normal  There is no distension  Palpations: Abdomen is soft  Tenderness: There is no abdominal tenderness  Musculoskeletal:      Cervical back: Neck supple                  Yina Lopez MD

## 2022-03-30 ENCOUNTER — OFFICE VISIT (OUTPATIENT)
Dept: FAMILY MEDICINE CLINIC | Facility: CLINIC | Age: 77
End: 2022-03-30
Payer: COMMERCIAL

## 2022-03-30 VITALS
HEIGHT: 64 IN | WEIGHT: 104.6 LBS | OXYGEN SATURATION: 99 % | BODY MASS INDEX: 17.86 KG/M2 | RESPIRATION RATE: 16 BRPM | TEMPERATURE: 97.8 F | SYSTOLIC BLOOD PRESSURE: 144 MMHG | DIASTOLIC BLOOD PRESSURE: 82 MMHG | HEART RATE: 87 BPM

## 2022-03-30 DIAGNOSIS — R73.09 ELEVATED GLUCOSE: ICD-10-CM

## 2022-03-30 DIAGNOSIS — E78.2 MIXED HYPERLIPIDEMIA: Primary | ICD-10-CM

## 2022-03-30 DIAGNOSIS — E03.9 HYPOTHYROIDISM, UNSPECIFIED TYPE: ICD-10-CM

## 2022-03-30 DIAGNOSIS — R91.1 PULMONARY NODULE: ICD-10-CM

## 2022-03-30 DIAGNOSIS — R49.0 HOARSENESS: ICD-10-CM

## 2022-03-30 PROCEDURE — 1036F TOBACCO NON-USER: CPT | Performed by: INTERNAL MEDICINE

## 2022-03-30 PROCEDURE — 1160F RVW MEDS BY RX/DR IN RCRD: CPT | Performed by: INTERNAL MEDICINE

## 2022-03-30 PROCEDURE — 3725F SCREEN DEPRESSION PERFORMED: CPT | Performed by: INTERNAL MEDICINE

## 2022-03-30 PROCEDURE — 99214 OFFICE O/P EST MOD 30 MIN: CPT | Performed by: INTERNAL MEDICINE

## 2022-03-30 NOTE — ASSESSMENT & PLAN NOTE
Reviewed labs with patient and lipids are favorable, continue atorvastatin as ordered  Discussed need for low fat diet and continued exercise

## 2022-03-30 NOTE — ASSESSMENT & PLAN NOTE
Control has worsened somewhat but continues to be in prediabetic range and will continue to follow  Encourage low carb diet and continue exercise

## 2022-04-05 ENCOUNTER — OFFICE VISIT (OUTPATIENT)
Dept: GASTROENTEROLOGY | Facility: CLINIC | Age: 77
End: 2022-04-05
Payer: COMMERCIAL

## 2022-04-05 VITALS
WEIGHT: 103.4 LBS | DIASTOLIC BLOOD PRESSURE: 72 MMHG | HEART RATE: 76 BPM | BODY MASS INDEX: 17.65 KG/M2 | SYSTOLIC BLOOD PRESSURE: 128 MMHG | HEIGHT: 64 IN

## 2022-04-05 DIAGNOSIS — K21.00 GASTROESOPHAGEAL REFLUX DISEASE WITH ESOPHAGITIS WITHOUT HEMORRHAGE: Primary | ICD-10-CM

## 2022-04-05 DIAGNOSIS — R05.3 CHRONIC COUGH: ICD-10-CM

## 2022-04-05 DIAGNOSIS — K22.70 BARRETT'S ESOPHAGUS WITHOUT DYSPLASIA: ICD-10-CM

## 2022-04-05 DIAGNOSIS — Z86.010 HX OF ADENOMATOUS COLONIC POLYPS: ICD-10-CM

## 2022-04-05 PROBLEM — Z86.0101 HX OF ADENOMATOUS COLONIC POLYPS: Status: ACTIVE | Noted: 2022-04-05

## 2022-04-05 PROCEDURE — 99214 OFFICE O/P EST MOD 30 MIN: CPT | Performed by: INTERNAL MEDICINE

## 2022-04-05 PROCEDURE — 1036F TOBACCO NON-USER: CPT | Performed by: INTERNAL MEDICINE

## 2022-04-05 PROCEDURE — 1160F RVW MEDS BY RX/DR IN RCRD: CPT | Performed by: INTERNAL MEDICINE

## 2022-04-05 RX ORDER — FAMOTIDINE 40 MG/1
40 TABLET, FILM COATED ORAL DAILY
Qty: 60 TABLET | Refills: 3 | Status: SHIPPED | OUTPATIENT
Start: 2022-04-05

## 2022-04-05 NOTE — PATIENT INSTRUCTIONS
We will restart your Pepcid at 40 mg twice a day after a month if things have improved he can cut it down to once a day  We will tentatively shoot for your next colonoscopy in 2027 which will be 7 years after last 1 you do have a history of tubular adenomas  That will likely be your last screening colonoscopy    Your next surveillance endoscopy for history of intestinal metaplasia of the cardia will be in December of 2024

## 2022-04-05 NOTE — PROGRESS NOTES
[facility] - Outpatient Follow-up Note  Aneta Guerra 64 y o  female MRN: [id number]  Encounter: [id number]          ASSESSMENT AND PLAN:      1  Gastroesophageal reflux disease with esophagitis without hemorrhage  Gastroesophageal reflux with some intestinal metaplasia of the lower esophagus  Her latest upper endoscopy failed to show any intestinal metaplasia  She still has occasional reflux  She stopped Prilosec because potential side effects  We discussed restarting Pepcid 40 mg twice a day she has into that  She does have a chronic cough that improved with the Pepcid  She likewise tells me she has vocal cord dysfunction she will be seeing ears nose and throat in the near future  - famotidine (PEPCID) 40 MG tablet; Take 1 tablet (40 mg total) by mouth daily  Dispense: 60 tablet; Refill: 3    2  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  As above next EGD December of 2024  - famotidine (PEPCID) 40 MG tablet; Take 1 tablet (40 mg total) by mouth daily  Dispense: 60 tablet; Refill: 3    3  Hx of adenomatous colonic polyps  Last colonoscopy 2020 no polyps were seen at that time however previous 2 colonoscopies had adenomas  We discussed tentative colonoscopy for 7 years after her last 1 which would be in 2027  She will be 81 at that time  4  Chronic cough  As above  She will otherwise follow-up in 4 months   - famotidine (PEPCID) 40 MG tablet; Take 1 tablet (40 mg total) by mouth daily  Dispense: 60 tablet; Refill: 3    ______________________________________________________________________    SUBJECTIVE:  Very nice 73-year-old lady we see for gastroesophageal reflux, intestinal metaplasia lower esophagus colon cancer screening  She does have a history of tubular adenomas and a redundant colon  Her biggest concern these days is not taking Prilosec because of potential side effects we discussed Pepcid    Additionally she tells me she has vocal cord dysfunction and will be seeing ears nose and throat  Otherwise she has no particular complaints no melena bright red blood per rectum  REVIEW OF SYSTEMS IS OTHERWISE NEGATIVE        Historical Information   Past Medical History:   Diagnosis Date    Colon polyp     COVID-19 2021    cough, lost sense of taste for a few days    COVID-19 vaccine series completed     Disease of thyroid gland     hypo    Essential tremor     hands and head    GERD (gastroesophageal reflux disease)     Glaucoma     both eyes    Hyperlipidemia     Osteoporosis     Persistent dry cough     d/t acid reflux    Pre-diabetes     Wears glasses     Wears hearing aid     darien  ears     Past Surgical History:   Procedure Laterality Date    CATARACT EXTRACTION Bilateral     COLONOSCOPY      EGD       Social History   Social History     Substance and Sexual Activity   Alcohol Use Yes    Alcohol/week: 7 0 standard drinks    Types: 7 Cans of beer per week    Comment: daily beer     Social History     Substance and Sexual Activity   Drug Use Never     Social History     Tobacco Use   Smoking Status Former Smoker    Types: Cigarettes    Quit date: 34    Years since quittin 2   Smokeless Tobacco Never Used     Family History   Problem Relation Age of Onset    Kidney disease Mother     Diabetes Brother         pre-diabetes    Cancer Father         throat cancer-    Diabetes Sister     Diabetes Brother         DM    Cancer Maternal Grandmother         breast    Diabetes Paternal Grandmother     Arthritis Brother     Cancer Brother     Dystonia Neg Hx     Tremor Neg Hx        Meds/Allergies       Current Outpatient Medications:     atorvastatin (LIPITOR) 10 mg tablet    brinzolamide (AZOPT) 1 % ophthalmic suspension    levothyroxine 50 mcg tablet    meclizine (ANTIVERT) 12 5 MG tablet    onabotulinumtoxin A (Botox) 100 units    travoprost (TRAVATAN Z) 0 004 % ophthalmic solution    famotidine (PEPCID) 40 MG tablet    No Known Allergies        Objective     Blood pressure 128/72, pulse 76, height 5' 4" (1 626 m), weight 46 9 kg (103 lb 6 4 oz)  Body mass index is 17 75 kg/m²  PHYSICAL EXAM:      General Appearance:   Alert, cooperative, no distress   HEENT:   Normocephalic, atraumatic, anicteric      Neck:  Supple, symmetrical, trachea midline   Lungs:   Clear to auscultation bilaterally; no rales, rhonchi or wheezing; respirations unlabored    Heart[de-identified]   Regular rate and rhythm; no murmur, rub, or gallop  Abdomen:   Soft, non-tender, non-distended; normal bowel sounds; no masses, no organomegaly    Genitalia:   Deferred    Rectal:   Deferred    Extremities:  No cyanosis, clubbing or edema    Pulses:  2+ and symmetric    Skin:  No jaundice, rashes, or lesions    Lymph nodes:  No palpable cervical lymphadenopathy        Lab Results:   No visits with results within 1 Day(s) from this visit  Latest known visit with results is:   Hospital Outpatient Visit on 12/06/2021   Component Date Value    Case Report 12/06/2021                      Value:Surgical Pathology Report                         Case: [id number]                                   Authorizing Provider:  Celia Munguia MD           Collected:           12/06/2021 0847              Ordering Location:     [facility]   Received:            12/06/2021 [address] [place] [address]                                                                       Pathologist:           Blayne Olvera MD                                                         Specimens:   A) - Stomach, ERRYTHEMA                                                                             B) - Esophagus, LOWER ESOPHAGUS, BARRETTS                                                  Final Diagnosis 12/06/2021                      Value: This result contains rich text formatting which cannot be displayed here   Additional Information 12/06/2021                      Value: This result contains rich text formatting which cannot be displayed here  Janna Ozuna Gross Description 12/06/2021                      Value: This result contains rich text formatting which cannot be displayed here  Radiology Results:   No results found

## 2022-04-11 ENCOUNTER — TELEPHONE (OUTPATIENT)
Dept: FAMILY MEDICINE CLINIC | Facility: CLINIC | Age: 77
End: 2022-04-11

## 2022-04-11 NOTE — TELEPHONE ENCOUNTER
Fredo Grier MD  [facility] Clinical  Please let patient know her CT was denied by insurance and give her information for Dr Brian Baugh for a consult  LMOM for patient to call back     Alana Sanabria MA

## 2022-04-23 ENCOUNTER — HOSPITAL ENCOUNTER (OUTPATIENT)
Dept: RADIOLOGY | Facility: HOSPITAL | Age: 77
Discharge: HOME | End: 2022-04-23
Attending: INTERNAL MEDICINE
Payer: COMMERCIAL

## 2022-04-23 DIAGNOSIS — R91.1 PULMONARY NODULE: ICD-10-CM

## 2022-04-23 PROCEDURE — 71046 X-RAY EXAM CHEST 2 VIEWS: CPT

## 2022-04-29 DIAGNOSIS — E78.2 MIXED HYPERLIPIDEMIA: ICD-10-CM

## 2022-04-29 RX ORDER — ATORVASTATIN CALCIUM 10 MG/1
10 TABLET, FILM COATED ORAL EVERY MORNING
Qty: 90 TABLET | Refills: 0 | Status: SHIPPED | OUTPATIENT
Start: 2022-04-29 | End: 2022-07-27 | Stop reason: SDUPTHER

## 2022-05-05 ENCOUNTER — OFFICE VISIT (OUTPATIENT)
Dept: OTOLARYNGOLOGY | Facility: CLINIC | Age: 77
End: 2022-05-05
Payer: COMMERCIAL

## 2022-05-05 VITALS
HEIGHT: 64 IN | OXYGEN SATURATION: 100 % | HEART RATE: 91 BPM | TEMPERATURE: 97.5 F | SYSTOLIC BLOOD PRESSURE: 140 MMHG | BODY MASS INDEX: 17.17 KG/M2 | WEIGHT: 100.6 LBS | DIASTOLIC BLOOD PRESSURE: 82 MMHG

## 2022-05-05 DIAGNOSIS — H61.23 BILATERAL IMPACTED CERUMEN: ICD-10-CM

## 2022-05-05 DIAGNOSIS — R49.0 HOARSENESS: ICD-10-CM

## 2022-05-05 DIAGNOSIS — R49.8 VOCAL TREMOR: ICD-10-CM

## 2022-05-05 DIAGNOSIS — R06.02 SHORTNESS OF BREATH: Primary | ICD-10-CM

## 2022-05-05 PROCEDURE — 69210 REMOVE IMPACTED EAR WAX UNI: CPT | Performed by: OTOLARYNGOLOGY

## 2022-05-05 PROCEDURE — 31575 DIAGNOSTIC LARYNGOSCOPY: CPT | Performed by: OTOLARYNGOLOGY

## 2022-05-05 PROCEDURE — 1160F RVW MEDS BY RX/DR IN RCRD: CPT | Performed by: OTOLARYNGOLOGY

## 2022-05-05 PROCEDURE — 1036F TOBACCO NON-USER: CPT | Performed by: OTOLARYNGOLOGY

## 2022-05-05 PROCEDURE — 99204 OFFICE O/P NEW MOD 45 MIN: CPT | Performed by: OTOLARYNGOLOGY

## 2022-05-05 NOTE — PROGRESS NOTES
Assessment/Plan:  No sign of vocal cord dysfunction  Minor vocal tremor, related to her underlying Essential Tremor  Cerumen removed  F/u 6 months for cerumen  No problem-specific Assessment & Plan notes found for this encounter  Diagnoses and all orders for this visit:    Shortness of breath    Hoarseness  Comments:  Exam is normal   Will refer to ENT as requested  Orders:  -     Ambulatory Referral to Otolaryngology    Vocal tremor    Bilateral impacted cerumen          Subjective:      Patient ID: Alayna Ruelas is a 64 y o  female  Pt c/o recent episodes of shortness of breath, in March  But now her shortness of breath is gone  The following portions of the patient's history were reviewed and updated as appropriate: allergies, current medications, past family history, past medical history, past social history, past surgical history and problem list     Review of Systems   Respiratory: Positive for shortness of breath  Negative for wheezing and stridor  Objective:      /82 (BP Location: Left arm, Patient Position: Sitting, Cuff Size: Adult)   Pulse 91   Temp 97 5 °F (36 4 °C) (Temporal)   Ht 5' 4" (1 626 m)   Wt 45 6 kg (100 lb 9 6 oz)   SpO2 100%   BMI 17 27 kg/m²          Physical Exam  Constitutional:       Appearance: She is well-developed  HENT:      Head: Normocephalic and atraumatic  Right Ear: Tympanic membrane, ear canal and external ear normal  No drainage  No middle ear effusion  Left Ear: Tympanic membrane, ear canal and external ear normal  No drainage  No middle ear effusion  Nose: Nose normal       Mouth/Throat:      Pharynx: Uvula midline  No oropharyngeal exudate  Tonsils: 1+ on the right  1+ on the left  Neck:      Thyroid: No thyroid mass or thyromegaly  Trachea: Trachea normal  No tracheal deviation  Lymphadenopathy:      Cervical: No cervical adenopathy  Neurological:      Mental Status: She is alert  Flexible Fiberoptic Nasolaryngoscopy Procedure Note:  Indication:  Shortness of breath, dysphonia  Verbal consent obtained  Surgeon: Steve Sanchez MD  Anesthesia: 4% lidocaine, oxymetazoline  Scope passed through nasal cavity   Nasopharynx: unremarkable  Oropharynx: unremarkable  Hypopharynx/Larynx:   Vocal fold mobility = nl   Laryngeal edema  = none   Laryngeal erythema = none   Vocal folds = nl   Valleculae= clear   Piriform Sinuses= clear   Other findings = minor vocal tremor  Patient tolerated procedure well without complications    PROCEDURE: Cerumen removal from ears  Impacted cerumen removal was performed by Dr Jam Fernandez and removed from both ears  The visualization instrument used was the operating otoscope and speculum  The ear cleaning instrument used was suction and alligator forceps  The outcome was successful and the patient tolerated the procedure well  There were no complications

## 2022-05-19 ENCOUNTER — TELEPHONE (OUTPATIENT)
Dept: NEUROLOGY | Facility: CLINIC | Age: 77
End: 2022-05-19

## 2022-05-19 NOTE — TELEPHONE ENCOUNTER
Called pt and spoke to [facility] in regards to scheduling her appt on 09/19/2022 in the [place] location as the original appt was scheduled too soon  Patient agreed to the appt and has been scheduled

## 2022-06-06 ENCOUNTER — TELEPHONE (OUTPATIENT)
Dept: NEUROLOGY | Facility: CLINIC | Age: 77
End: 2022-06-06

## 2022-06-07 ENCOUNTER — CONSULT (OUTPATIENT)
Dept: PULMONOLOGY | Facility: MEDICAL CENTER | Age: 77
End: 2022-06-07
Payer: COMMERCIAL

## 2022-06-07 VITALS
HEART RATE: 79 BPM | BODY MASS INDEX: 17.42 KG/M2 | WEIGHT: 102 LBS | OXYGEN SATURATION: 99 % | RESPIRATION RATE: 12 BRPM | SYSTOLIC BLOOD PRESSURE: 148 MMHG | DIASTOLIC BLOOD PRESSURE: 82 MMHG | HEIGHT: 64 IN | TEMPERATURE: 98.7 F

## 2022-06-07 DIAGNOSIS — R91.1 LUNG NODULE: ICD-10-CM

## 2022-06-07 DIAGNOSIS — J43.0 UNILATERAL EMPHYSEMA (HCC): Primary | ICD-10-CM

## 2022-06-07 PROBLEM — R06.00 DYSPNEA: Status: ACTIVE | Noted: 2022-06-07

## 2022-06-07 PROCEDURE — 1036F TOBACCO NON-USER: CPT | Performed by: INTERNAL MEDICINE

## 2022-06-07 PROCEDURE — 94010 BREATHING CAPACITY TEST: CPT | Performed by: INTERNAL MEDICINE

## 2022-06-07 PROCEDURE — 99205 OFFICE O/P NEW HI 60 MIN: CPT | Performed by: INTERNAL MEDICINE

## 2022-06-07 RX ORDER — SUCRALFATE ORAL 1 G/10ML
1 SUSPENSION ORAL 2 TIMES DAILY
Qty: 1660 ML | Refills: 3 | Status: SHIPPED | OUTPATIENT
Start: 2022-06-07

## 2022-06-07 NOTE — PROGRESS NOTES
Assessment/Plan:    Lung nodule  Patient has a ground-glass lung nodule that was stable and 2020 but required follow-up in March of this year that was not scheduled  Will schedule her for this CT scan and follow up the results  I left a message for the patient  Dyspnea  Brendan and  I spoke at length about her shortness of breath  It has resolved and her chest x-ray and office spirometry are normal on today's visit  I do think it was secondary to her acid reflux causing either vocal cord dysfunction and spasm or and allergic reaction  At this point I think focusing on her acid reflux is paramount  She cannot take PPI due to side effects  She will continue the Pepcid but I have added Carafate twice a day and instructed to get a mattress wedge to elevate the head of her bed 30°  If she has return in her symptoms she will come back for follow-up visit so we can differentiate whether this is vocal cord dysfunction as her NPO was normal when she is asymptomatic or asthma requiring treatment  Diagnoses and all orders for this visit:    Unilateral emphysema ([address] )  -     POCT spirometry  -     sucralfate (CARAFATE) 1 g/10 mL suspension; Take 10 mL (1 g total) by mouth 2 (two) times a day  -     Misc  Devices (Bed Wedge) MISC; Use daily at bedtime Triangular mattress wedge to elevate head of the bed 30 degrees    Lung nodule  -     CT chest without contrast; Future          Subjective:      Patient ID: Radha Chris is a 76 y o  female  Boone Armando is here for evaluation of shortness of breath  In January she developed shortness of breath which was so severe would wake her up at night and she had to sit on the edge of the bed to catch her breath  She has some inspiratory wheezing and cough however she has had a cough for many years due to acid reflux  She was unable to walk as far without shortness of breath and this lasted through January February and March    In April started to subside at this point she is back to her baseline  Prior to January she had no respiratory complaints and was able to walk 2 miles a day  She was a smoker for many years but quit when she was 34years old  She has 4 birds at home parakeets and cocktails for many years  She had COVID in January of 2021  Shortness of Breath  Pertinent negatives include no chest pain, coughing, leg swelling or wheezing  The following portions of the patient's history were reviewed and updated as appropriate: allergies, current medications, past family history, past medical history, past social history, past surgical history and problem list     Review of Systems   Constitutional: Negative  Negative for unexpected weight change  HENT: Negative  Negative for postnasal drip  Eyes: Negative  Respiratory: Negative  Negative for cough, shortness of breath and wheezing  Cardiovascular: Negative  Negative for chest pain and leg swelling  Gastrointestinal: Negative  Endocrine: Negative  Genitourinary: Negative  Musculoskeletal: Negative  Allergic/Immunologic: Negative  Neurological: Negative  Hematological: Negative  Objective:      /82 (BP Location: Left arm, Patient Position: Sitting, Cuff Size: Standard)   Pulse 79   Temp 98 7 °F (37 1 °C) (Temporal)   Resp 12   Ht 5' 4" (1 626 m)   Wt 46 3 kg (102 lb)   SpO2 99%   BMI 17 51 kg/m²          Physical Exam  Constitutional:       Appearance: She is well-developed  HENT:      Head: Normocephalic  Eyes:      Pupils: Pupils are equal, round, and reactive to light  Cardiovascular:      Rate and Rhythm: Normal rate  Heart sounds: No murmur heard  Pulmonary:      Effort: Pulmonary effort is normal  No respiratory distress  Breath sounds: Normal breath sounds  No wheezing or rales  Abdominal:      Palpations: Abdomen is soft  Musculoskeletal:         General: Normal range of motion        Cervical back: Normal range of motion and neck supple  Skin:     General: Skin is warm and dry  Neurological:      Mental Status: She is alert and oriented to person, place, and time

## 2022-06-07 NOTE — PATIENT INSTRUCTIONS
Breathing issue is secondary to acid reflux causing either vocal cord dysfunction or asthma  Improve acid reflux with pepcid PLUS carafate twice a day AND mattress wedge like "wellness wedge"  Return is breathing issues worsen again

## 2022-06-07 NOTE — ASSESSMENT & PLAN NOTE
Brendan and  I spoke at length about her shortness of breath  It has resolved and her chest x-ray and office spirometry are normal on today's visit  I do think it was secondary to her acid reflux causing either vocal cord dysfunction and spasm or and allergic reaction  At this point I think focusing on her acid reflux is paramount  She cannot take PPI due to side effects  She will continue the Pepcid but I have added Carafate twice a day and instructed to get a mattress wedge to elevate the head of her bed 30°  If she has return in her symptoms she will come back for follow-up visit so we can differentiate whether this is vocal cord dysfunction as her NPO was normal when she is asymptomatic or asthma requiring treatment

## 2022-06-07 NOTE — ASSESSMENT & PLAN NOTE
Patient has a ground-glass lung nodule that was stable and 2020 but required follow-up in March of this year that was not scheduled  Will schedule her for this CT scan and follow up the results  I left a message for the patient

## 2022-06-09 NOTE — TELEPHONE ENCOUNTER
Called [place] spoke to [facility]  to check status of authorization  They state it hasn't been received yet  Started authorization by phone instead       Approved   Botox 200 units   Auth #[id number]  4 visits    Please use our stock

## 2022-06-13 ENCOUNTER — PROCEDURE VISIT (OUTPATIENT)
Dept: NEUROLOGY | Facility: CLINIC | Age: 77
End: 2022-06-13
Payer: COMMERCIAL

## 2022-06-13 VITALS
TEMPERATURE: 97.9 F | HEART RATE: 86 BPM | SYSTOLIC BLOOD PRESSURE: 110 MMHG | DIASTOLIC BLOOD PRESSURE: 70 MMHG | OXYGEN SATURATION: 100 %

## 2022-06-13 DIAGNOSIS — G24.3 CERVICAL DYSTONIA: Primary | ICD-10-CM

## 2022-06-13 PROCEDURE — 64616 CHEMODENERV MUSC NECK DYSTON: CPT | Performed by: PSYCHIATRY & NEUROLOGY

## 2022-06-13 NOTE — PROGRESS NOTES
Universal Protocol   Consent: Written consent obtained  Consent given by: patient        Chemodenervation     Date/Time 6/13/2022 11:38 AM     Performed by  Agustín Davis MD     Authorized by Agustín Davis MD        Pre-procedure details      Prepped With: Alcohol     Anesthesia  (see MAR for exact dosages): Anesthesia method:  None   Procedure details     Position:  Upright   Botox     Brand:  Botox    mL's of preservative free sterile saline:  1    Final Concentration per CC:  100 units    Needle gauge: 27G  Procedures     Botox Procedures: cervical dystonia      Indications: spasmodic torticollis      Date of last injection:  3/7/2022   Post-procedure details      Chemodenervation:  Neck, excluding muscles of the larynx    Patient tolerance of procedure: Tolerated well, no immediate complications   Comments                            Ms Priya Fabian is a 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 who presents for Botox injections  Review of history reveals, head tremor started in the mid 1970s and she was initially diagnosed with ET around 1976  She reports today hand tremor was present prior to the head tremor (L>R causing her to switch dominant hands)  Medications including propranolol, primidone, topiramate, gabapentin were ineffective in controlling tremor  In 2014, she was diagnosed with cervical dystonia and underwent 3 rounds of Botox treatment (1st treatment led to head droop requiring the use of a soft cervical collar but tremor control, 2nd ineffective, 3rd seemed to exacerbate the head tremor)  Klonopin at night helped reduce the tremor to allow for sleep  Zonisamide used for hand tremor  Interval history:  Taran Martinezin the injections she had been doing well  It has been worse since she is so far for injection  She has increase posterior neck pain  This improves if lying down  Over the  Past month pain has been so severe she has been wearing a soft collar which helps   She will hear cracking in her neck  Head tremor is most bothersome  Head tremor dampens after injections and pain improves    Lasts about 3 months      Exam:  Moderate yes-yes diagnosed head tremor with mild right laterocllis      Left trapezius hypertrophy     Injections:  RIGHT Splenious capitis: 30 units x2 (60 units total)   RIGHT levator scapuli : 25 units (higher location)  LEFT Splenious capitis: 50 units   LEFT cervical trapezius: 25 units   LEFT shoulder trapezius: total 40 units (15 unitsx 1 25 units x1 at the transition between cervical and shoulder)     Total injected: 200 units   Discarded: 0

## 2022-06-14 ENCOUNTER — HOSPITAL ENCOUNTER (OUTPATIENT)
Dept: RADIOLOGY | Facility: HOSPITAL | Age: 77
Discharge: HOME | End: 2022-06-14
Attending: INTERNAL MEDICINE
Payer: COMMERCIAL

## 2022-06-14 DIAGNOSIS — R91.1 LUNG NODULE: ICD-10-CM

## 2022-06-14 PROCEDURE — 71250 CT THORAX DX C-: CPT

## 2022-06-14 PROCEDURE — G1004 CDSM NDSC: HCPCS

## 2022-07-27 DIAGNOSIS — E78.2 MIXED HYPERLIPIDEMIA: ICD-10-CM

## 2022-07-27 RX ORDER — ATORVASTATIN CALCIUM 10 MG/1
10 TABLET, FILM COATED ORAL EVERY MORNING
Qty: 90 TABLET | Refills: 0 | Status: SHIPPED | OUTPATIENT
Start: 2022-07-27 | End: 2022-10-24

## 2022-08-30 DIAGNOSIS — E03.9 HYPOTHYROIDISM, UNSPECIFIED TYPE: ICD-10-CM

## 2022-08-30 RX ORDER — LEVOTHYROXINE SODIUM 0.05 MG/1
50 TABLET ORAL EVERY MORNING
Qty: 90 TABLET | Refills: 0 | Status: SHIPPED | OUTPATIENT
Start: 2022-08-30

## 2022-09-08 ENCOUNTER — OFFICE VISIT (OUTPATIENT)
Dept: GASTROENTEROLOGY | Facility: CLINIC | Age: 77
End: 2022-09-08
Payer: COMMERCIAL

## 2022-09-08 VITALS — BODY MASS INDEX: 17.58 KG/M2 | WEIGHT: 103 LBS | HEIGHT: 64 IN

## 2022-09-08 DIAGNOSIS — K21.00 GASTROESOPHAGEAL REFLUX DISEASE WITH ESOPHAGITIS WITHOUT HEMORRHAGE: Primary | ICD-10-CM

## 2022-09-08 DIAGNOSIS — R05.3 CHRONIC COUGH: ICD-10-CM

## 2022-09-08 DIAGNOSIS — K22.70 BARRETT'S ESOPHAGUS WITHOUT DYSPLASIA: ICD-10-CM

## 2022-09-08 DIAGNOSIS — Z86.010 HX OF ADENOMATOUS COLONIC POLYPS: ICD-10-CM

## 2022-09-08 PROCEDURE — 1160F RVW MEDS BY RX/DR IN RCRD: CPT | Performed by: INTERNAL MEDICINE

## 2022-09-08 PROCEDURE — 99213 OFFICE O/P EST LOW 20 MIN: CPT | Performed by: INTERNAL MEDICINE

## 2022-09-08 NOTE — PROGRESS NOTES
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  Next surveillance is due in December of 2024    3  Chronic cough  As above    4  Hx of adenomatous colonic polyps  Next surveillance colonoscopy 2027  She will otherwise follow-up in 6 months       ______________________________________________________________________    SUBJECTIVE:  Very pleasant 64-year-old lady presents for follow-up of reflux  She is now on Pepcid she is off the PPI she never took Carafate  There is question of vocal cord dysfunction she did see years nose and throat and pulmonologist with respect to her chronic cough which has improved  Sort of comes and goes  Feels well  She is chewing gum on a regular basis to neutralize any reflux and drinking lemon water  She exercises on a regular basis  Colon cancer screening up-to-date  Barretts surveillance up-to-date  REVIEW OF SYSTEMS IS OTHERWISE NEGATIVE        Historical Information   Past Medical History:   Diagnosis Date    Colon polyp     COVID-19 02/2021    cough, lost sense of taste for a few days    COVID-19 vaccine series completed     Diabetes mellitus ([address] )     Disease of thyroid gland     hypo    Essential tremor     hands and head    GERD (gastroesophageal reflux disease)     Glaucoma     both eyes    Hyperlipidemia     Osteoporosis     Persistent dry cough     d/t acid reflux    Pre-diabetes     Wears glasses     Wears hearing aid     darien  ears     Past Surgical History:   Procedure Laterality Date    CATARACT EXTRACTION Bilateral     COLONOSCOPY      EGD      UPPER GASTROINTESTINAL ENDOSCOPY       Social History   Social History     Substance and Sexual Activity   Alcohol Use Yes    Alcohol/week: 7 0 standard drinks    Types: 7 Cans of beer per week    Comment: daily beer     Social History     Substance and Sexual Activity   Drug Use Never     Social History     Tobacco Use   Smoking Status Former Smoker    Packs/day:  00    Years: 25 00    Pack years: 25 00    Types: Cigarettes    Quit date: 34    Years since quittin 7   Smokeless Tobacco Never Used     Family History   Problem Relation Age of Onset    Kidney disease Mother     Throat cancer Father     Diabetes Sister     Diabetes Brother         pre-diabetes    Diabetes Brother         DM    Arthritis Brother     Cancer Brother     Cancer Maternal Grandmother         breast    Diabetes Paternal Grandmother     Dystonia Neg Hx     Tremor Neg Hx        Meds/Allergies       Current Outpatient Medications:     atorvastatin (LIPITOR) 10 mg tablet    brinzolamide (AZOPT) 1 % ophthalmic suspension    famotidine (PEPCID) 40 MG tablet    levothyroxine 50 mcg tablet    Misc  Devices (Bed Wedge) MISC    onabotulinumtoxin A (Botox) 100 units    travoprost (TRAVATAN-Z) 0 004 % ophthalmic solution    meclizine (ANTIVERT) 12 5 MG tablet    sucralfate (CARAFATE) 1 g/10 mL suspension    No Known Allergies        Objective     Height 5' 4" (1 626 m), weight 46 7 kg (103 lb)  Body mass index is 17 68 kg/m²  PHYSICAL EXAM:      General Appearance:   Alert, cooperative, no distress   HEENT:   Normocephalic, atraumatic, anicteric      Neck:  Supple, symmetrical, trachea midline   Lungs:   Clear to auscultation bilaterally; no rales, rhonchi or wheezing; respirations unlabored    Heart[de-identified]   Regular rate and rhythm; no murmur, rub, or gallop     Abdomen:   Soft, non-tender, non-distended; normal bowel sounds; no masses, no organomegaly    Genitalia:   Deferred    Rectal:   Deferred    Extremities:  No cyanosis, clubbing or edema    Pulses:  2+ and symmetric    Skin:  No jaundice, rashes, or lesions    Lymph nodes:  No palpable cervical lymphadenopathy        Lab Results:   No visits with results within 1 Day(s) from this visit  Latest known visit with results is:   Hospital Outpatient Visit on 12/06/2021   Component Date Value    Case Report 12/06/2021                      Value:Surgical Pathology Report                         Case: [id number]                                   Authorizing Provider:  Miguel Alejandra MD           Collected:           12/06/2021 0847              Ordering Location:     [facility]   Received:            12/06/2021 [address] [place] [address]                                                                       Pathologist:           Isai Alvarenga MD                                                         Specimens:   A) - Stomach, ERRYTHEMA                                                                             B) - Esophagus, LOWER ESOPHAGUS, BARRETTS                                                  Final Diagnosis 12/06/2021                      Value: This result contains rich text formatting which cannot be displayed here   Additional Information 12/06/2021                      Value: This result contains rich text formatting which cannot be displayed here  Judy Juan Alberto Gross Description 12/06/2021                      Value: This result contains rich text formatting which cannot be displayed here  Radiology Results:   No results found

## 2022-09-19 ENCOUNTER — PROCEDURE VISIT (OUTPATIENT)
Dept: NEUROLOGY | Facility: CLINIC | Age: 77
End: 2022-09-19
Payer: COMMERCIAL

## 2022-09-19 VITALS — TEMPERATURE: 97.7 F | SYSTOLIC BLOOD PRESSURE: 128 MMHG | DIASTOLIC BLOOD PRESSURE: 76 MMHG

## 2022-09-19 DIAGNOSIS — G25.0 ESSENTIAL TREMOR: ICD-10-CM

## 2022-09-19 DIAGNOSIS — G24.3 CERVICAL DYSTONIA: Primary | ICD-10-CM

## 2022-09-19 PROCEDURE — 64616 CHEMODENERV MUSC NECK DYSTON: CPT | Performed by: PSYCHIATRY & NEUROLOGY

## 2022-09-19 NOTE — PROGRESS NOTES
Universal Protocol   Consent: Written consent obtained  Consent given by: patient        Chemodenervation     Date/Time 9/19/2022 11:02 AM     Performed by  Verna Altman MD     Authorized by Verna Altman MD        Pre-procedure details      Prepped With: Alcohol     Anesthesia  (see MAR for exact dosages): Anesthesia method:  None   Procedure details     Position:  Upright   Botox     Brand:  Botox    Final Concentration per CC:  100 units (1cc per each 100 units)    Needle gauge: 27G  Procedures     Botox Procedures: cervical dystonia      Indications: spasmodic torticollis      Date of last injection:  6/13/2022   Post-procedure details      Chemodenervation:  Neck, excluding muscles of the larynx    Neck Muscle Location[de-identified]  Bilateral neck muscle   Comments                      Ms Randi Carrera is a 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 who presents for Botox injections  Review of history reveals, head tremor started in the mid 1970s and she was initially diagnosed with ET around 1976  She reports today hand tremor was present prior to the head tremor (L>R causing her to switch dominant hands)  Medications including propranolol, primidone, topiramate, gabapentin were ineffective in controlling tremor  In 2014, she was diagnosed with cervical dystonia and underwent 3 rounds of Botox treatment (1st treatment led to head droop requiring the use of a soft cervical collar but tremor control, 2nd ineffective, 3rd seemed to exacerbate the head tremor)  Klonopin at night helped reduce the tremor to allow for sleep  Zonisamide used for hand tremor  Interval history:  Overall improvement with last injections  Tremor and posturing dampened  Lasting about 3 months       Exam:  Moderate yes-yes diagnosed head tremor with mild right laterocllis      Left trapezius hypertrophy     Injections:  RIGHT Splenious capitis: 30 units x2 (60 units total)   RIGHT levator scapuli : 25 units (higher location)  LEFT Splenious capitis: 50 units   LEFT cervical trapezius: 25 units   LEFT shoulder trapezius: total 40 units (15 unitsx 1 25 units x1 at the transition between cervical and shoulder)     Total injected: 200 units   Discarded: 0

## 2022-10-01 LAB
ALBUMIN SERPL-MCNC: 4.6 G/DL (ref 3.6–5.1)
ALBUMIN/GLOB SERPL: 1.4 (CALC) (ref 1–2.5)
ALP SERPL-CCNC: 89 U/L (ref 37–153)
ALT SERPL-CCNC: 15 U/L (ref 6–29)
AST SERPL-CCNC: 21 U/L (ref 10–35)
BASOPHILS # BLD AUTO: 63 CELLS/UL (ref 0–200)
BASOPHILS NFR BLD AUTO: 0.9 %
BILIRUB SERPL-MCNC: 0.9 MG/DL (ref 0.2–1.2)
BUN SERPL-MCNC: 12 MG/DL (ref 7–25)
BUN/CREAT SERPL: 12 (CALC) (ref 6–22)
CALCIUM SERPL-MCNC: 9.9 MG/DL (ref 8.6–10.4)
CHLORIDE SERPL-SCNC: 96 MMOL/L (ref 98–110)
CHOLEST SERPL-MCNC: 181 MG/DL
CHOLEST/HDLC SERPL: 2.3 (CALC)
CO2 SERPL-SCNC: 27 MMOL/L (ref 20–32)
CREAT SERPL-MCNC: 1.04 MG/DL (ref 0.6–1)
EOSINOPHIL # BLD AUTO: 238 CELLS/UL (ref 15–500)
EOSINOPHIL NFR BLD AUTO: 3.4 %
ERYTHROCYTE [DISTWIDTH] IN BLOOD BY AUTOMATED COUNT: 11.6 % (ref 11–15)
GFR/BSA.PRED SERPLBLD CYS-BASED-ARV: 55 ML/MIN/1.73M2
GLOBULIN SER CALC-MCNC: 3.2 G/DL (CALC) (ref 1.9–3.7)
GLUCOSE SERPL-MCNC: 94 MG/DL (ref 65–99)
HBA1C MFR BLD: 5.7 % OF TOTAL HGB
HCT VFR BLD AUTO: 36.5 % (ref 35–45)
HDLC SERPL-MCNC: 80 MG/DL
HGB BLD-MCNC: 12.3 G/DL (ref 11.7–15.5)
LDLC SERPL CALC-MCNC: 81 MG/DL (CALC)
LYMPHOCYTES # BLD AUTO: 1792 CELLS/UL (ref 850–3900)
LYMPHOCYTES NFR BLD AUTO: 25.6 %
MCH RBC QN AUTO: 30.4 PG (ref 27–33)
MCHC RBC AUTO-ENTMCNC: 33.7 G/DL (ref 32–36)
MCV RBC AUTO: 90.1 FL (ref 80–100)
MONOCYTES # BLD AUTO: 812 CELLS/UL (ref 200–950)
MONOCYTES NFR BLD AUTO: 11.6 %
NEUTROPHILS # BLD AUTO: 4095 CELLS/UL (ref 1500–7800)
NEUTROPHILS NFR BLD AUTO: 58.5 %
NONHDLC SERPL-MCNC: 101 MG/DL (CALC)
PLATELET # BLD AUTO: 317 THOUSAND/UL (ref 140–400)
PMV BLD REES-ECKER: 9.1 FL (ref 7.5–12.5)
POTASSIUM SERPL-SCNC: 4.2 MMOL/L (ref 3.5–5.3)
PROT SERPL-MCNC: 7.8 G/DL (ref 6.1–8.1)
RBC # BLD AUTO: 4.05 MILLION/UL (ref 3.8–5.1)
SODIUM SERPL-SCNC: 133 MMOL/L (ref 135–146)
TRIGL SERPL-MCNC: 100 MG/DL
TSH SERPL-ACNC: 1.76 MIU/L (ref 0.4–4.5)
WBC # BLD AUTO: 7 THOUSAND/UL (ref 3.8–10.8)

## 2022-10-03 ENCOUNTER — TELEPHONE (OUTPATIENT)
Dept: ADMINISTRATIVE | Facility: HOSPITAL | Age: 77
End: 2022-10-03

## 2022-10-03 ENCOUNTER — OFFICE VISIT (OUTPATIENT)
Dept: FAMILY MEDICINE CLINIC | Facility: CLINIC | Age: 77
End: 2022-10-03
Payer: COMMERCIAL

## 2022-10-03 VITALS
HEART RATE: 72 BPM | BODY MASS INDEX: 18.1 KG/M2 | RESPIRATION RATE: 20 BRPM | HEIGHT: 64 IN | WEIGHT: 106 LBS | SYSTOLIC BLOOD PRESSURE: 140 MMHG | TEMPERATURE: 97.7 F | DIASTOLIC BLOOD PRESSURE: 80 MMHG

## 2022-10-03 DIAGNOSIS — Z23 NEED FOR VACCINATION: ICD-10-CM

## 2022-10-03 DIAGNOSIS — Z00.00 MEDICARE ANNUAL WELLNESS VISIT, SUBSEQUENT: Primary | ICD-10-CM

## 2022-10-03 DIAGNOSIS — E78.2 MIXED HYPERLIPIDEMIA: ICD-10-CM

## 2022-10-03 DIAGNOSIS — R73.09 ELEVATED GLUCOSE: ICD-10-CM

## 2022-10-03 DIAGNOSIS — E03.9 HYPOTHYROIDISM, UNSPECIFIED TYPE: ICD-10-CM

## 2022-10-03 DIAGNOSIS — N18.31 STAGE 3A CHRONIC KIDNEY DISEASE (HCC): ICD-10-CM

## 2022-10-03 PROCEDURE — G0439 PPPS, SUBSEQ VISIT: HCPCS | Performed by: INTERNAL MEDICINE

## 2022-10-03 PROCEDURE — 99214 OFFICE O/P EST MOD 30 MIN: CPT | Performed by: INTERNAL MEDICINE

## 2022-10-03 PROCEDURE — G0008 ADMIN INFLUENZA VIRUS VAC: HCPCS

## 2022-10-03 PROCEDURE — 90662 IIV NO PRSV INCREASED AG IM: CPT

## 2022-10-03 NOTE — PROGRESS NOTES
Assessment and Plan:     Problem List Items Addressed This Visit        Endocrine    Hypothyroidism     Clinically euthyroid and TSH is normal, continue levothyroxine at current dose  Relevant Orders    TSH, 3rd generation with Free T4 reflex       Genitourinary    Stage 3a chronic kidney disease ([facility] [address] )     Lab Results   Component Value Date    EGFR 55 (L) 09/30/2022    EGFR 54 01/14/2018    CREATININE 1 04 (H) 09/30/2022    CREATININE 1 03 (H) 03/16/2022    CREATININE 0 97 (H) 09/03/2021     Stable and will continue to monitor, avoid NSAIDS  Other    Mixed hyperlipidemia     Reviewed labs with patient and these are within goal, continue atorvastatin as ordered  Encouraged continued exercise  Relevant Orders    CBC and differential    Comprehensive metabolic panel    Lipid panel    Elevated glucose     Improved and will continue to monitor  Relevant Orders    Hemoglobin A1c (w/out EAG)      Other Visit Diagnoses     Medicare annual wellness visit, subsequent    -  Primary    Need for vaccination        Relevant Orders    influenza vaccine, high-dose, PF 0 7 mL (FLUZONE HIGH-DOSE) (Completed)           Preventive health issues were discussed with patient, and age appropriate screening tests were ordered as noted in patient's After Visit Summary  Personalized health advice and appropriate referrals for health education or preventive services given if needed, as noted in patient's After Visit Summary  History of Present Illness:     Patient presents for a Medicare Wellness Visit    Here for AWV and follow up  She feels well and has been more active than previously, started up her clog dancing again and is exercising regularly  Denies chest pain or dyspnea  Has been having issues with continued dystonia and is getting botox injections, also possibly having issues with vocal cord dystonia as well but is following with specialists         Patient Care Team:  Jameson Haider MD as PCP - Buddy Zaldivar MD     Review of Systems:     Review of Systems   Constitutional: Negative  HENT: Negative  Eyes: Negative  Respiratory: Negative  Cardiovascular: Negative  Gastrointestinal: Negative  Endocrine: Negative  Genitourinary: Negative  Musculoskeletal: Positive for neck stiffness  Skin: Negative  Allergic/Immunologic: Negative  Neurological: Negative  Hematological: Negative  Psychiatric/Behavioral: Negative           Problem List:     Patient Active Problem List   Diagnosis    Essential tremor    Colon polyp    Hypothyroidism    Osteoporosis    Mixed hyperlipidemia    Cervical dystonia    Elevated glucose    Elevated serum creatinine    Glaucoma (increased eye pressure)    COVID-19    Acid reflux    Hyponatremia    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    Lung nodule    Hx of adenomatous colonic polyps    Dyspnea    Stage 3a chronic kidney disease ([facility] )      Past Medical and Surgical History:     Past Medical History:   Diagnosis Date    Colon polyp     COVID-19 02/2021    cough, lost sense of taste for a few days    COVID-19 vaccine series completed     Diabetes mellitus ([facility] )     Disease of thyroid gland     hypo    Essential tremor     hands and head    GERD (gastroesophageal reflux disease)     Glaucoma     both eyes    Hyperlipidemia     Osteoporosis     Persistent dry cough     d/t acid reflux    Pre-diabetes     Wears glasses     Wears hearing aid     darien  ears     Past Surgical History:   Procedure Laterality Date    CATARACT EXTRACTION Bilateral     COLONOSCOPY      EGD      UPPER GASTROINTESTINAL ENDOSCOPY        Family History:     Family History   Problem Relation Age of Onset    Kidney disease Mother     Throat cancer Father     Diabetes Sister     Diabetes Brother         pre-diabetes    Diabetes Brother         DM    Arthritis Brother     Cancer Brother     Cancer Maternal Grandmother breast    Diabetes Paternal Grandmother     Dystonia Neg Hx     Tremor Neg Hx       Social History:     Social History     Socioeconomic History    Marital status: Single     Spouse name: None    Number of children: None    Years of education: None    Highest education level: None   Occupational History    None   Tobacco Use    Smoking status: Former Smoker     Packs/day: 1 00     Years: 25 00     Pack years: 25 00     Types: Cigarettes     Quit date:      Years since quittin 7    Smokeless tobacco: Never Used   Vaping Use    Vaping Use: Never used   Substance and Sexual Activity    Alcohol use: Yes     Alcohol/week: 7 0 standard drinks     Types: 7 Cans of beer per week     Comment: daily beer    Drug use: Never    Sexual activity: None   Other Topics Concern    None   Social History Narrative    None     Social Determinants of Health     Financial Resource Strain: Low Risk     Difficulty of Paying Living Expenses: Not hard at all   Food Insecurity: Not on file   Transportation Needs: No Transportation Needs    Lack of Transportation (Medical): No    Lack of Transportation (Non-Medical):  No   Physical Activity: Not on file   Stress: Not on file   Social Connections: Not on file   Intimate Partner Violence: Not on file   Housing Stability: Not on file      Medications and Allergies:     Current Outpatient Medications   Medication Sig Dispense Refill    atorvastatin (LIPITOR) 10 mg tablet Take 1 tablet (10 mg total) by mouth every morning 90 tablet 0    brinzolamide (AZOPT) 1 % ophthalmic suspension Administer to the right eye 2 (two) times a day       famotidine (PEPCID) 40 MG tablet Take 1 tablet (40 mg total) by mouth daily (Patient taking differently: Take 40 mg by mouth daily Half hour prior to breakfast) 60 tablet 3    levothyroxine 50 mcg tablet Take 1 tablet (50 mcg total) by mouth every morning 90 tablet 0    onabotulinumtoxin A (Botox) 100 units Cervical Dystonia (Patient taking differently: Cervical Dystonia-next dose 12/21) 1 each 11    travoprost (TRAVATAN-Z) 0 004 % ophthalmic solution Apply 1 drop to eye daily at bedtime Both eyes       No current facility-administered medications for this visit  No Known Allergies   Immunizations:     Immunization History   Administered Date(s) Administered    COVID-19 MODERNA VACC 0 25 ML IM BOOSTER 12/03/2021    COVID-19 MODERNA VACC 0 5 ML IM 04/30/2021, 06/01/2021    INFLUENZA 09/14/2018    Influenza Split High Dose Preservative Free IM 10/18/2012, 10/03/2013    Influenza, high dose seasonal 0 7 mL 08/27/2020, 09/16/2021, 10/03/2022    Influenza, seasonal, injectable 10/20/2010, 10/26/2011, 09/08/2014, 09/12/2015    Pneumococcal Conjugate 13-Valent 12/16/2015    Pneumococcal Polysaccharide PPV23 07/09/2009    influenza, trivalent, adjuvanted 09/14/2018, 09/19/2019      Health Maintenance:         Topic Date Due    Hepatitis C Screening  Never done         Topic Date Due    COVID-19 Vaccine (4 - Booster for Debbie Guzmán series) 04/03/2022      Medicare Screening Tests and Risk Assessments:     Ozzy Julian is here for her Subsequent Wellness visit  Health Risk Assessment:   Patient rates overall health as good  Patient feels that their physical health rating is same  Patient is satisfied with their life  Eyesight was rated as same  Hearing was rated as slightly worse  Patient feels that their emotional and mental health rating is slightly better  Patients states they are never, rarely angry  Patient states they are never, rarely unusually tired/fatigued  Pain experienced in the last 7 days has been none  Patient states that she has experienced weight loss or gain in last 6 months  Fall Risk Screening: In the past year, patient has experienced: no history of falling in past year      Urinary Incontinence Screening:   Patient has not leaked urine accidently in the last six months       Home Safety:  Patient does not have trouble with stairs inside or outside of their home  Patient has working smoke alarms and has working carbon monoxide detector  Home safety hazards include: none  Nutrition:   Current diet is Regular  Medications:   Patient is currently taking over-the-counter supplements  OTC medications include: see medication list  Patient is able to manage medications  Activities of Daily Living (ADLs)/Instrumental Activities of Daily Living (IADLs):   Walk and transfer into and out of bed and chair?: Yes  Dress and groom yourself?: Yes    Bathe or shower yourself?: Yes    Feed yourself?  Yes  Do your laundry/housekeeping?: Yes  Manage your money, pay your bills and track your expenses?: Yes  Make your own meals?: Yes    Do your own shopping?: Yes    Previous Hospitalizations:   Any hospitalizations or ED visits within the last 12 months?: No      Advance Care Planning:   Living will: Yes    Advanced directive: Yes    End of Life Decisions reviewed with patient: Yes      Cognitive Screening:   Provider or family/friend/caregiver concerned regarding cognition?: No    PREVENTIVE SCREENINGS      Cardiovascular Screening:    General: Screening Not Indicated and History Lipid Disorder      Diabetes Screening:     General: Screening Current      Breast Cancer Screening:     General: Screening Not Indicated      Cervical Cancer Screening:    General: Screening Not Indicated      Osteoporosis Screening:    General: Screening Not Indicated and History Osteoporosis      Abdominal Aortic Aneurysm (AAA) Screening:        General: Screening Not Indicated      Lung Cancer Screening:     General: Screening Not Indicated      Hepatitis C Screening:    General: Risks and Benefits Discussed    Screening, Brief Intervention, and Referral to Treatment (SBIRT)    Screening  Typical number of drinks in a day: 1    Single Item Drug Screening:  How often have you used an illegal drug (including marijuana) or a prescription medication for non-medical reasons in the past year? never    Single Item Drug Screen Score: 0  Interpretation: Negative screen for possible drug use disorder    Brief Intervention  Alcohol & drug use screenings were reviewed  No concerns regarding substance use disorder identified  Healthy alcohol use/limits discussed  Other Counseling Topics:   Car/seat belt/driving safety, skin self-exam, sunscreen and calcium and vitamin D intake and regular weightbearing exercise  No exam data present     Physical Exam:     /80   Pulse 72   Temp 97 7 °F (36 5 °C)   Resp 20   Ht 5' 4" (1 626 m)   Wt 48 1 kg (106 lb)   BMI 18 19 kg/m²     Physical Exam  Constitutional:       General: She is not in acute distress  Appearance: She is well-developed  She is not diaphoretic  HENT:      Head: Normocephalic and atraumatic  Right Ear: External ear normal       Left Ear: External ear normal       Nose: Nose normal    Eyes:      Pupils: Pupils are equal, round, and reactive to light  Neck:      Thyroid: No thyromegaly  Vascular: No JVD  Cardiovascular:      Rate and Rhythm: Regular rhythm  Heart sounds: No murmur heard  No friction rub  No gallop  Pulmonary:      Effort: Pulmonary effort is normal       Breath sounds: Normal breath sounds  No wheezing or rales  Abdominal:      General: Bowel sounds are normal  There is no distension  Palpations: Abdomen is soft  Tenderness: There is no abdominal tenderness  Musculoskeletal:         General: Normal range of motion  Cervical back: Normal range of motion and neck supple  Skin:     General: Skin is warm and dry  Findings: No rash  Neurological:      Mental Status: She is alert and oriented to person, place, and time  Cranial Nerves: No cranial nerve deficit  Sensory: No sensory deficit  Motor: No abnormal muscle tone        Coordination: Coordination normal       Deep Tendon Reflexes: Reflexes normal  Psychiatric:         Behavior: Behavior normal          Thought Content:  Thought content normal          Judgment: Judgment normal           Lyly Barron MD

## 2022-10-03 NOTE — PATIENT INSTRUCTIONS
Medicare Preventive Visit Patient Instructions  Thank you for completing your Welcome to Medicare Visit or Medicare Annual Wellness Visit today  Your next wellness visit will be due in one year (10/4/2023)  The screening/preventive services that you may require over the next 5-10 years are detailed below  Some tests may not apply to you based off risk factors and/or age  Screening tests ordered at today's visit but not completed yet may show as past due  Also, please note that scanned in results may not display below  Preventive Screenings:  Service Recommendations Previous Testing/Comments   Colorectal Cancer Screening  * Colonoscopy    * Fecal Occult Blood Test (FOBT)/Fecal Immunochemical Test (FIT)  * Fecal DNA/Cologuard Test  * Flexible Sigmoidoscopy Age: 39-70 years old   Colonoscopy: every 10 years (may be performed more frequently if at higher risk)  OR  FOBT/FIT: every 1 year  OR  Cologuard: every 3 years  OR  Sigmoidoscopy: every 5 years  Screening may be recommended earlier than age 39 if at higher risk for colorectal cancer  Also, an individualized decision between you and your healthcare provider will decide whether screening between the ages of 74-80 would be appropriate  Colonoscopy: 08/26/2020  FOBT/FIT: Not on file  Cologuard: Not on file  Sigmoidoscopy: Not on file          Breast Cancer Screening Age: 36 years old  Frequency: every 1-2 years  Not required if history of left and right mastectomy Mammogram: 11/08/2018        Cervical Cancer Screening Between the ages of 21-29, pap smear recommended once every 3 years  Between the ages of 33-67, can perform pap smear with HPV co-testing every 5 years     Recommendations may differ for women with a history of total hysterectomy, cervical cancer, or abnormal pap smears in past  Pap Smear: Not on file        Hepatitis C Screening Once for adults born between [facility]  More frequently in patients at high risk for Hepatitis C Hep C Antibody: Not on file        Diabetes Screening 1-2 times per year if you're at risk for diabetes or have pre-diabetes Fasting glucose: No results in last 5 years (No results in last 5 years)  A1C: 5 7 % of total Hgb (9/30/2022)      Cholesterol Screening Once every 5 years if you don't have a lipid disorder  May order more often based on risk factors  Lipid panel: 09/30/2022          Other Preventive Screenings Covered by Medicare:  1  Abdominal Aortic Aneurysm (AAA) Screening: covered once if your at risk  You're considered to be at risk if you have a family history of AAA  2  Lung Cancer Screening: covers low dose CT scan once per year if you meet all of the following conditions: (1) Age 50-69; (2) No signs or symptoms of lung cancer; (3) Current smoker or have quit smoking within the last 15 years; (4) You have a tobacco smoking history of at least 20 pack years (packs per day multiplied by number of years you smoked); (5) You get a written order from a healthcare provider  3  Glaucoma Screening: covered annually if you're considered high risk: (1) You have diabetes OR (2) Family history of glaucoma OR (3)  aged 48 and older OR (3)  American aged 72 and older  3  Osteoporosis Screening: covered every 2 years if you meet one of the following conditions: (1) You're estrogen deficient and at risk for osteoporosis based off medical history and other findings; (2) Have a vertebral abnormality; (3) On glucocorticoid therapy for more than 3 months; (4) Have primary hyperparathyroidism; (5) On osteoporosis medications and need to assess response to drug therapy  · Last bone density test (DXA Scan): Not on file  5  HIV Screening: covered annually if you're between the age of 12-76  Also covered annually if you are younger than 13 and older than 72 with risk factors for HIV infection  For pregnant patients, it is covered up to 3 times per pregnancy      Immunizations:  Immunization Recommendations Influenza Vaccine Annual influenza vaccination during flu season is recommended for all persons aged >= 6 months who do not have contraindications   Pneumococcal Vaccine   * Pneumococcal conjugate vaccine = PCV13 (Prevnar 13), PCV15 (Vaxneuvance), PCV20 (Prevnar 20)  * Pneumococcal polysaccharide vaccine = PPSV23 (Pneumovax) Adults 25-60 years old: 1-3 doses may be recommended based on certain risk factors  Adults 72 years old: 1-2 doses may be recommended based off what pneumonia vaccine you previously received   Hepatitis B Vaccine 3 dose series if at intermediate or high risk (ex: diabetes, end stage renal disease, liver disease)   Tetanus (Td) Vaccine - COST NOT COVERED BY MEDICARE PART B Following completion of primary series, a booster dose should be given every 10 years to maintain immunity against tetanus  Td may also be given as tetanus wound prophylaxis  Tdap Vaccine - COST NOT COVERED BY MEDICARE PART B Recommended at least once for all adults  For pregnant patients, recommended with each pregnancy  Shingles Vaccine (Shingrix) - COST NOT COVERED BY MEDICARE PART B  2 shot series recommended in those aged 48 and above     Health Maintenance Due:      Topic Date Due    Hepatitis C Screening  Never done     Immunizations Due:      Topic Date Due    COVID-19 Vaccine (4 - Booster for Moderna series) 04/03/2022    Influenza Vaccine (1) 09/01/2022     Advance Directives   What are advance directives? Advance directives are legal documents that state your wishes and plans for medical care  These plans are made ahead of time in case you lose your ability to make decisions for yourself  Advance directives can apply to any medical decision, such as the treatments you want, and if you want to donate organs  What are the types of advance directives? There are many types of advance directives, and each state has rules about how to use them   You may choose a combination of any of the following:  · Living will:  This is a written record of the treatment you want  You can also choose which treatments you do not want, which to limit, and which to stop at a certain time  This includes surgery, medicine, IV fluid, and tube feedings  · Durable power of  for healthcare [facility]): This is a written record that states who you want to make healthcare choices for you when you are unable to make them for yourself  This person, called a proxy, is usually a family member or a friend  You may choose more than 1 proxy  · Do not resuscitate (DNR) order:  A DNR order is used in case your heart stops beating or you stop breathing  It is a request not to have certain forms of treatment, such as CPR  A DNR order may be included in other types of advance directives  · Medical directive: This covers the care that you want if you are in a coma, near death, or unable to make decisions for yourself  You can list the treatments you want for each condition  Treatment may include pain medicine, surgery, blood transfusions, dialysis, IV or tube feedings, and a ventilator (breathing machine)  · Values history: This document has questions about your views, beliefs, and how you feel and think about life  This information can help others choose the care that you would choose  Why are advance directives important? An advance directive helps you control your care  Although spoken wishes may be used, it is better to have your wishes written down  Spoken wishes can be misunderstood, or not followed  Treatments may be given even if you do not want them  An advance directive may make it easier for your family to make difficult choices about your care  Underweight  Underweight is defined as having a body mass index (BMI) of less than 18 5 kg/m2   Anorexia  is a loss of appetite, decreased food intake, or both  Your appetite naturally decreases as you get older  You also get full faster than you used to   This occurs because your body needs less energy  Other body changes can also lead to a decreased appetite  Even though some appetite loss is normal, you still need to get enough calories and nutrients to keep you healthy  You can start to lose too much weight if you do not eat as much food as your body needs  Unwanted weight loss can cause health problems, or worsen health problems you already have  You can also become dehydrated if you do not drink enough liquid  How to eat healthy and get enough nutrients:   · Choose healthy foods  Eat a variety of fruits, vegetables, whole grains, low-fat dairy foods, lean meats, and other protein foods  Limit foods high in fat, sugar, and salt  Limit or avoid alcohol as directed  Work with a dietitian to help you plan your meals if you need to follow a special diet  A dietitian can also teach you how to modify foods if you have trouble chewing or swallowing  · Snack on healthy foods between meals  if you only eat a small amount during meals  Snacks provide extra healthy nutrients and calories between meals  Examples include fruit, cheese, and whole grain crackers  · Drink liquids as directed  to avoid dehydration  Drink liquids between meals if they cause you to get full too quickly during meals  Ask how much liquid to drink each day and which liquids are best for you  · Use herbs, spices, and flavor enhancers to add flavor to foods  Avoid using herbs and spice blends that also contain sodium  Ask your healthcare provider or dietitian about flavor enhancers  Flavor enhancers with ham, natural fitzpatrick, and roast beef flavors can also be sprinkled on food to add flavor  · Share meals with others as often as you can  Eating with others may help you to eat better during meal time  Ask family members, neighbors, or friends to join you for lunch  There are also senior centers where you can meet people, and share meals with them  · Ask family and friends for help  with shopping or preparing foods   Ask for a ride to the grocery store, if needed  © Copyright [employer] 2018 Information is for End User's use only and may not be sold, redistributed or otherwise used for commercial purposes   All illustrations and images included in CareNotes® are the copyrighted property of A D A M , Inc  or [address] [place] [address]

## 2022-10-03 NOTE — ASSESSMENT & PLAN NOTE
Lab Results   Component Value Date    EGFR 55 (L) 09/30/2022    EGFR 54 01/14/2018    CREATININE 1 04 (H) 09/30/2022    CREATININE 1 03 (H) 03/16/2022    CREATININE 0 97 (H) 09/03/2021     Stable and will continue to monitor, avoid NSAIDS

## 2022-10-03 NOTE — ASSESSMENT & PLAN NOTE
Reviewed labs with patient and these are within goal, continue atorvastatin as ordered  Encouraged continued exercise

## 2022-10-03 NOTE — TELEPHONE ENCOUNTER
Dr Susie Shields,     At checkout Brendan requested her 6 month follow up lab work  Pt goes to [facility]     Pt would like a call when ready and printed for her to   Thank you!

## 2022-10-12 NOTE — PROGRESS NOTES
Bed: H3  Expected date:   Expected time:   Means of arrival:   Comments:   Last time patient was in office for Botox on 8/19 Dr Maynor Paige only ended up using 100 units when there where 200 units delivered  One 100 unit vital is still located in the fridge  Moving forward I will double check with Dr Maynor Paige on the dosage for this patient

## 2022-10-24 DIAGNOSIS — E78.2 MIXED HYPERLIPIDEMIA: ICD-10-CM

## 2022-10-24 RX ORDER — ATORVASTATIN CALCIUM 10 MG/1
TABLET, FILM COATED ORAL
Qty: 90 TABLET | Refills: 0 | Status: SHIPPED | OUTPATIENT
Start: 2022-10-24

## 2022-11-03 ENCOUNTER — OFFICE VISIT (OUTPATIENT)
Dept: OTOLARYNGOLOGY | Facility: CLINIC | Age: 77
End: 2022-11-03

## 2022-11-03 VITALS — TEMPERATURE: 97.4 F | HEIGHT: 64 IN | BODY MASS INDEX: 17.93 KG/M2 | WEIGHT: 105 LBS

## 2022-11-03 DIAGNOSIS — H61.23 BILATERAL IMPACTED CERUMEN: Primary | ICD-10-CM

## 2022-11-03 DIAGNOSIS — L29.9 ITCHING OF EAR: ICD-10-CM

## 2022-11-03 RX ORDER — FLUOCINOLONE ACETONIDE 0.11 MG/ML
3 OIL AURICULAR (OTIC) 2 TIMES DAILY PRN
Qty: 20 ML | Refills: 11 | Status: SHIPPED | OUTPATIENT
Start: 2022-11-03

## 2022-11-03 NOTE — PROGRESS NOTES
Assessment/Plan:  Cerumen removed  DermOtic for itchy ears  F/u in 6 months  Diagnosis ICD-10-CM Associated Orders   1  Bilateral impacted cerumen  H61 23           Subjective:      Patient ID: Dana Joshi is a 76 y o  female  Pt presents for cerumen removal   Pt also c/o itchy ears from time to time  The following portions of the patient's history were reviewed and updated as appropriate: allergies, current medications, past family history, past medical history, past social history, past surgical history and problem list     Review of Systems      Objective:      Temp (!) 97 4 °F (36 3 °C) (Temporal)   Ht 5' 4" (1 626 m)   Wt 47 6 kg (105 lb)   BMI 18 02 kg/m²          Physical Exam  HENT:      Right Ear: Tympanic membrane, ear canal and external ear normal  There is impacted cerumen  Left Ear: Tympanic membrane, ear canal and external ear normal  There is impacted cerumen  PROCEDURE: Cerumen removal from ears  Impacted cerumen removal was performed by Dr Chaka Heck and removed from both ears  The visualization instrument used was the operating otoscope and speculum  The ear cleaning instrument used was alligator forceps  The outcome was successful and the patient tolerated the procedure well  There were no complications

## 2022-12-05 DIAGNOSIS — K21.00 GASTROESOPHAGEAL REFLUX DISEASE WITH ESOPHAGITIS WITHOUT HEMORRHAGE: ICD-10-CM

## 2022-12-05 DIAGNOSIS — E03.9 HYPOTHYROIDISM, UNSPECIFIED TYPE: ICD-10-CM

## 2022-12-05 DIAGNOSIS — R05.3 CHRONIC COUGH: ICD-10-CM

## 2022-12-05 DIAGNOSIS — K22.70 BARRETT'S ESOPHAGUS WITHOUT DYSPLASIA: ICD-10-CM

## 2022-12-05 RX ORDER — LEVOTHYROXINE SODIUM 0.05 MG/1
50 TABLET ORAL EVERY MORNING
Qty: 90 TABLET | Refills: 0 | Status: SHIPPED | OUTPATIENT
Start: 2022-12-05

## 2022-12-06 RX ORDER — FAMOTIDINE 40 MG/1
40 TABLET, FILM COATED ORAL DAILY
Qty: 60 TABLET | Refills: 0 | Status: SHIPPED | OUTPATIENT
Start: 2022-12-06

## 2023-01-03 ENCOUNTER — OFFICE VISIT (OUTPATIENT)
Dept: FAMILY MEDICINE CLINIC | Facility: CLINIC | Age: 78
End: 2023-01-03

## 2023-01-03 VITALS
HEIGHT: 64 IN | RESPIRATION RATE: 16 BRPM | OXYGEN SATURATION: 99 % | BODY MASS INDEX: 17.96 KG/M2 | WEIGHT: 105.2 LBS | SYSTOLIC BLOOD PRESSURE: 168 MMHG | TEMPERATURE: 97.6 F | HEART RATE: 111 BPM | DIASTOLIC BLOOD PRESSURE: 84 MMHG

## 2023-01-03 DIAGNOSIS — L29.0 PRURITUS ANI: Primary | ICD-10-CM

## 2023-01-03 DIAGNOSIS — N18.31 STAGE 3A CHRONIC KIDNEY DISEASE (HCC): ICD-10-CM

## 2023-01-03 PROBLEM — J43.0 UNILATERAL EMPHYSEMA (HCC): Status: ACTIVE | Noted: 2023-01-03

## 2023-01-03 RX ORDER — ALBENDAZOLE 200 MG/1
400 TABLET, FILM COATED ORAL ONCE
Qty: 2 TABLET | Refills: 0 | Status: SHIPPED | OUTPATIENT
Start: 2023-01-03 | End: 2023-01-03

## 2023-01-03 RX ORDER — CLOBETASOL PROPIONATE 0.5 MG/G
OINTMENT TOPICAL 2 TIMES DAILY
Qty: 60 G | Refills: 0 | Status: SHIPPED | OUTPATIENT
Start: 2023-01-03

## 2023-01-03 NOTE — PATIENT INSTRUCTIONS
Take 1 tablet when you  the prescription  Repeat dose in 2 weeks  May use ointment twice daily as needed for itching     Pinworm test 2 weeks after the second dose

## 2023-01-03 NOTE — ASSESSMENT & PLAN NOTE
Lab Results   Component Value Date    EGFR 55 (L) 09/30/2022    EGFR 54 01/14/2018    CREATININE 1 04 (H) 09/30/2022    CREATININE 1 03 (H) 03/16/2022    CREATININE 0 97 (H) 09/03/2021   reviewed labs, stable

## 2023-01-03 NOTE — PROGRESS NOTES
Assessment/Plan:    Will treat as below  Reviewed dosing and use ointment for itching  Order given for repeat pinworm test after she has completed treatment    1  Pruritus ani  -     albendazole (ALBENZA) 200 mg tablet; Take 2 tablets (400 mg total) by mouth 1 (one) time for 1 dose Repeat dose in 2 weeks  -     Pinworm prep; Future  -     clobetasol (TEMOVATE) 0 05 % ointment; Apply topically 2 (two) times a day    2  Stage 3a chronic kidney disease [facility])  Assessment & Plan:  Lab Results   Component Value Date    EGFR 55 (L) 09/30/2022    EGFR 54 01/14/2018    CREATININE 1 04 (H) 09/30/2022    CREATININE 1 03 (H) 03/16/2022    CREATININE 0 97 (H) 09/03/2021   reviewed labs, stable  Patient Instructions   Take 1 tablet when you  the prescription  Repeat dose in 2 weeks  May use ointment twice daily as needed for itching  Pinworm test 2 weeks after the second dose         Return if symptoms worsen or fail to improve  Subjective:      Patient ID: Mariya Abebe is a 76 y o  female  Chief Complaint   Patient presents with   • Vaginal Itching   • Anal Itching     Patient is here today for vaginal and rectal itching for 2 months  L Manuel/LPN       Here today with vaginal and perineal itch  Reports she has had pinworms for the past year  For the past 2 months, vaginal itch was getting worse  She did a tape test at home and was able to isolate 2 worms from her vagina and 2 from her rectum, which she has brought with her today   Has been using alcohol to control itch  No bowel issues or pelvic/abdominal pain        The following portions of the patient's history were reviewed and updated as appropriate: allergies, current medications, past family history, past medical history, past social history, past surgical history and problem list     Review of Systems   Constitutional: Negative  Gastrointestinal: Negative      Genitourinary:        See HPI   All other systems reviewed and are negative  Current Outpatient Medications   Medication Sig Dispense Refill   • albendazole (ALBENZA) 200 mg tablet Take 2 tablets (400 mg total) by mouth 1 (one) time for 1 dose Repeat dose in 2 weeks 2 tablet 0   • atorvastatin (LIPITOR) 10 mg tablet take 1 tablet by mouth every morning 90 tablet 0   • brinzolamide (AZOPT) 1 % ophthalmic suspension Administer to the right eye 2 (two) times a day      • clobetasol (TEMOVATE) 0 05 % ointment Apply topically 2 (two) times a day 60 g 0   • famotidine (PEPCID) 40 MG tablet Take 1 tablet (40 mg total) by mouth daily 60 tablet 0   • fluocinolone acetonide (DERMOTIC) 0 01 % otic oil Administer 3 drops into both ears 2 (two) times a day as needed (for itchy ears) 20 mL 11   • levothyroxine 50 mcg tablet Take 1 tablet (50 mcg total) by mouth every morning 90 tablet 0   • travoprost (TRAVATAN-Z) 0 004 % ophthalmic solution Apply 1 drop to eye daily at bedtime Both eyes     • onabotulinumtoxin A (Botox) 100 units Cervical Dystonia (Patient not taking: Reported on 11/3/2022) 1 each 11     No current facility-administered medications for this visit  Objective:    /84   Pulse (!) 111   Temp 97 6 °F (36 4 °C) (Temporal)   Resp 16   Ht 5' 4" (1 626 m)   Wt 47 7 kg (105 lb 3 2 oz)   SpO2 99%   BMI 18 06 kg/m²        Physical Exam  Vitals and nursing note reviewed  Constitutional:       Appearance: Normal appearance  She is normal weight  Pulmonary:      Effort: Pulmonary effort is normal    Genitourinary:     Rectum: Normal       Comments: No perineal rash or irritation noted  Anus and external genitalia appear normal  Supposed worms observed under magnification, appear to be thready, suspect toilet paper   Neurological:      Mental Status: She is alert     Psychiatric:         Mood and Affect: Mood normal          Behavior: Behavior normal                 KINDRA Gibson

## 2023-01-15 DIAGNOSIS — E78.2 MIXED HYPERLIPIDEMIA: ICD-10-CM

## 2023-01-16 RX ORDER — ATORVASTATIN CALCIUM 10 MG/1
TABLET, FILM COATED ORAL
Qty: 90 TABLET | Refills: 0 | Status: SHIPPED | OUTPATIENT
Start: 2023-01-16

## 2023-01-27 DIAGNOSIS — R05.3 CHRONIC COUGH: ICD-10-CM

## 2023-01-27 DIAGNOSIS — K21.00 GASTROESOPHAGEAL REFLUX DISEASE WITH ESOPHAGITIS WITHOUT HEMORRHAGE: ICD-10-CM

## 2023-01-27 DIAGNOSIS — K22.70 BARRETT'S ESOPHAGUS WITHOUT DYSPLASIA: ICD-10-CM

## 2023-01-27 RX ORDER — FAMOTIDINE 40 MG/1
TABLET, FILM COATED ORAL
Qty: 60 TABLET | Refills: 0 | Status: SHIPPED | OUTPATIENT
Start: 2023-01-27

## 2023-02-25 DIAGNOSIS — E03.9 HYPOTHYROIDISM, UNSPECIFIED TYPE: ICD-10-CM

## 2023-02-27 RX ORDER — LEVOTHYROXINE SODIUM 0.05 MG/1
TABLET ORAL
Qty: 90 TABLET | Refills: 0 | Status: SHIPPED | OUTPATIENT
Start: 2023-02-27

## 2023-03-13 ENCOUNTER — TELEPHONE (OUTPATIENT)
Dept: FAMILY MEDICINE CLINIC | Facility: CLINIC | Age: 78
End: 2023-03-13

## 2023-03-13 DIAGNOSIS — H91.93 BILATERAL HEARING LOSS, UNSPECIFIED HEARING LOSS TYPE: Primary | ICD-10-CM

## 2023-03-21 ENCOUNTER — OFFICE VISIT (OUTPATIENT)
Dept: AUDIOLOGY | Facility: CLINIC | Age: 78
End: 2023-03-21

## 2023-03-21 DIAGNOSIS — H90.3 SENSORINEURAL HEARING LOSS (SNHL), BILATERAL: Primary | ICD-10-CM

## 2023-03-21 NOTE — PROGRESS NOTES
ADULT HEARING EVALUATION - [place] [address] AUDIOLOGY      Patient Name: Neisha Segura   MRN:  [id number]   :  1945   Age: 76 y o  Gender: female   DOS: 3/21/2023     HISTORY:     Neisha Segura, a 76 y o  female, was seen on 3/21/2023 at the referral of Dr Camilo Coley for an audiometric evaluation  Ms Samir Roman was unaccompanied to today's visit  Ms Samir Roman is a new patient to our practice  Today, Ms Sue Wright primary complaint(s) was difficulty hearing with her [employer] hearing aids  She noted that she has been pressured to buy new hearing aids multiple times and no longer wished to proceed with the practice  She is open to new hearing aids, but now wants to establish care with Constantine Hobson  Onset of symptoms reportedly began many years ago and have been gradual with time  Ms Samir Roman denied otalgia, otorrhea, aural fullness, tinnitus and dizziness  Other documented medical history states that Ms Samir Roman  has a past medical history of Colon polyp, COVID-19 (2021), COVID-19 vaccine series completed, Diabetes mellitus ([address] ), Disease of thyroid gland, Ear problems (approx  ), Essential tremor, GERD (gastroesophageal reflux disease), Glaucoma, HL (hearing loss) (around ), Hyperlipidemia, Osteoporosis, Persistent dry cough, Pre-diabetes, Voice disorder (Dec , 2021), Wears glasses, and Wears hearing aid  Ms Samir Roman has had her hearing tested previously (no prior records brought today or available for review)      RESULTS:    Otoscopic Evaluation:   Right Ear: Unremarkable, canal clear   Left Ear: Unremarkable, canal clear    Tympanometry:   Right Ear: Type A; normal middle ear pressure and static compliance    Left Ear: Type A; normal middle ear pressure and static compliance     Audiometry:  Conventional pure tone audiometry from 250 - 8000 Hz  obtained with good reliability and revealed the following:     Right Ear: mild to profound sensorineural hearing loss (SNHL)   Left Ear: moderate to profound sensorineural hearing loss (SNHL)     Speech Audiometry:    Speech Reception (SRT)   Right Ear: 50 dB HL   Left Ear: 50 dB HL    Word Recognition Scores (WRS):  Right Ear: fair (76 % correct)     Left Ear: fair (72 % correct)   Stimuli: [id number]-6    IMPRESSIONS:  Bilateral SNHL, mild to profound AD, moderate to profound AS  The results of today's findings were reviewed with Ms Harris and her hearing thresholds were explained at length  Treatment options, including amplification and communication strategies, were discussed as appropriate  Ms Mercedes Joseph voiced understanding of her test results and had no further questions  RECOMMENDATIONS:    1 ) Follow-up with referring provider  2 ) [employer] hearing aids are locked and require proprietary software  Ms Mercedes Joseph was counseled that these cannot be modified further at our office  REM may be possible to see whether her current aids are meeting prescriptive targets  *see attached audiogram*    It was a pleasure working with Ms Harris today  Thank you for referring this patient  Liu Ruiz    Clinical Audiologist    [address] [place] [id number]

## 2023-03-24 DIAGNOSIS — K21.00 GASTROESOPHAGEAL REFLUX DISEASE WITH ESOPHAGITIS WITHOUT HEMORRHAGE: ICD-10-CM

## 2023-03-24 DIAGNOSIS — R05.3 CHRONIC COUGH: ICD-10-CM

## 2023-03-24 DIAGNOSIS — K22.70 BARRETT'S ESOPHAGUS WITHOUT DYSPLASIA: ICD-10-CM

## 2023-03-24 RX ORDER — FAMOTIDINE 40 MG/1
TABLET, FILM COATED ORAL
Qty: 60 TABLET | Refills: 0 | Status: SHIPPED | OUTPATIENT
Start: 2023-03-24

## 2023-03-31 ENCOUNTER — RA CDI HCC (OUTPATIENT)
Dept: OTHER | Facility: HOSPITAL | Age: 78
End: 2023-03-31

## 2023-03-31 NOTE — PROGRESS NOTES
[facility]  coding opportunities       Chart reviewed, no opportunity found:   [address]        Patients Insurance     Medicare Insurance: [employer] Advantage

## 2023-04-03 ENCOUNTER — OFFICE VISIT (OUTPATIENT)
Dept: AUDIOLOGY | Facility: CLINIC | Age: 78
End: 2023-04-03

## 2023-04-03 DIAGNOSIS — H90.3 SENSORINEURAL HEARING LOSS (SNHL), BILATERAL: Primary | ICD-10-CM

## 2023-04-03 NOTE — PROGRESS NOTES
HEARING AID EVALUTION - [facility] AUDIOLOGY    Patient Name: Benny Ramirez   MRN:  [id number]   :  1945   Age: 76 y o  Gender: female   DOS: 4/3/2023     Benny Ramirez was seen today for a hearing aid evaluation following her audiometric testing performed on 2023  Ms Gilles Lara was unaccompanied to today's visit  Ms Gilles Lara was referred by Dr Federica Pineda  The audiometric evaluation on 2023 revealed     Right Ear: mild to profound sensorineural hearing loss (SNHL)  Left Ear: moderate to profound sensorineural hearing loss (SNHL)     Word recognition scores were fair in the right ear and fair in the left ear  This findings were reviewed with Ms Harris  All of her questions regarding her hearing status were addressed, and the importance of realistic expectations of hearing loss and amplification were emphasized  Hearing aids are assistive devices and are not designed to restore normal hearing  The difference between peripheral hearing loss and speech understanding (central hearing loss) was explained  While improvement with amplification is possible, there will always be word understanding problems due to the inherent nature of hearing loss  These problems will be greater in background noise than in quiet situations  Ms Gilles Lara was counseled on the importance of self-advocacy, motivation, as well as effective communication strategies that can be used to optimize hearing aid success  These effective communication strategies include:   1 ) Maintaining face-to-face communication, allowing for speechreading of facial expressions, lips, and gestures  2 ) Reducing background noise and distance between communication partners  3 ) Having communication partners reduce their rate of speech when appropriate  4 ) Beginning conversation by getting communication partner's attention and stating the topic, allowing for context clues to help fill in gaps in comprehension      5 ) Asking for rephrasing of aspects of conversation that are missed when needed rather than asking for repetitions  To better determine which communication difficulties they are looking to improve upon, Ms Darrell Nicolas prioritized communication difficulties include:    1 ) improving word understanding on the telephone  2 ) better hearing in background noise (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)  3 ) 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)    Strengths and limitations of amplification, including various hearing aid styles, options, and circuitry were discussed at length with Ms Harris  Based on the degree of her hearing loss, preferences, and lifestyle needs, a trial with [employer] Real 2 mini[employer] was recommended with size 2 85 dB gain receivers and 10mm closed domes  Ms Annabelle Witt preferred the chroma beige form factor color  [facility]'s office policies regarding our hearing aid program were reviewed, including the 45 day trial period, non-refundable fees, as well as the  warranties  At this time, Ms Annabelle Witt wished to proceed with purchase of the above hearing aids  Devices ordered as specified (confirmation # [id number])  She will return for a hearing aid fitting in 1-2 weeks  It was a pleasure working with Ms Harris  They were encouraged to contact the clinic with any further questions or concerns in the meantime  Liu Griffin    Clinical Audiologist    [address] [address] [phone]

## 2023-04-04 LAB
ALBUMIN SERPL-MCNC: 4.6 G/DL (ref 3.6–5.1)
ALBUMIN/GLOB SERPL: 1.6 (CALC) (ref 1–2.5)
ALP SERPL-CCNC: 82 U/L (ref 37–153)
ALT SERPL-CCNC: 18 U/L (ref 6–29)
AST SERPL-CCNC: 21 U/L (ref 10–35)
BASOPHILS # BLD AUTO: 77 CELLS/UL (ref 0–200)
BASOPHILS NFR BLD AUTO: 0.9 %
BILIRUB SERPL-MCNC: 0.6 MG/DL (ref 0.2–1.2)
BUN SERPL-MCNC: 12 MG/DL (ref 7–25)
BUN/CREAT SERPL: ABNORMAL (CALC) (ref 6–22)
CALCIUM SERPL-MCNC: 9.7 MG/DL (ref 8.6–10.4)
CHLORIDE SERPL-SCNC: 100 MMOL/L (ref 98–110)
CHOLEST SERPL-MCNC: 179 MG/DL
CHOLEST/HDLC SERPL: 2.2 (CALC)
CO2 SERPL-SCNC: 28 MMOL/L (ref 20–32)
CREAT SERPL-MCNC: 0.99 MG/DL (ref 0.6–1)
EOSINOPHIL # BLD AUTO: 281 CELLS/UL (ref 15–500)
EOSINOPHIL NFR BLD AUTO: 3.3 %
ERYTHROCYTE [DISTWIDTH] IN BLOOD BY AUTOMATED COUNT: 11.5 % (ref 11–15)
GFR/BSA.PRED SERPLBLD CYS-BASED-ARV: 58 ML/MIN/1.73M2
GLOBULIN SER CALC-MCNC: 2.8 G/DL (CALC) (ref 1.9–3.7)
GLUCOSE SERPL-MCNC: 105 MG/DL (ref 65–99)
HBA1C MFR BLD: 5.8 % OF TOTAL HGB
HCT VFR BLD AUTO: 35.7 % (ref 35–45)
HDLC SERPL-MCNC: 82 MG/DL
HGB BLD-MCNC: 12.3 G/DL (ref 11.7–15.5)
LDLC SERPL CALC-MCNC: 77 MG/DL (CALC)
LYMPHOCYTES # BLD AUTO: 1777 CELLS/UL (ref 850–3900)
LYMPHOCYTES NFR BLD AUTO: 20.9 %
MCH RBC QN AUTO: 30.8 PG (ref 27–33)
MCHC RBC AUTO-ENTMCNC: 34.5 G/DL (ref 32–36)
MCV RBC AUTO: 89.3 FL (ref 80–100)
MONOCYTES # BLD AUTO: 969 CELLS/UL (ref 200–950)
MONOCYTES NFR BLD AUTO: 11.4 %
NEUTROPHILS # BLD AUTO: 5398 CELLS/UL (ref 1500–7800)
NEUTROPHILS NFR BLD AUTO: 63.5 %
NONHDLC SERPL-MCNC: 97 MG/DL (CALC)
PLATELET # BLD AUTO: 304 THOUSAND/UL (ref 140–400)
PMV BLD REES-ECKER: 9.3 FL (ref 7.5–12.5)
POTASSIUM SERPL-SCNC: 4.2 MMOL/L (ref 3.5–5.3)
PROT SERPL-MCNC: 7.4 G/DL (ref 6.1–8.1)
RBC # BLD AUTO: 4 MILLION/UL (ref 3.8–5.1)
SODIUM SERPL-SCNC: 136 MMOL/L (ref 135–146)
TRIGL SERPL-MCNC: 116 MG/DL
TSH SERPL-ACNC: 2.12 MIU/L (ref 0.4–4.5)
WBC # BLD AUTO: 8.5 THOUSAND/UL (ref 3.8–10.8)

## 2023-04-06 ENCOUNTER — OFFICE VISIT (OUTPATIENT)
Dept: FAMILY MEDICINE CLINIC | Facility: CLINIC | Age: 78
End: 2023-04-06

## 2023-04-06 VITALS
HEIGHT: 64 IN | HEART RATE: 100 BPM | SYSTOLIC BLOOD PRESSURE: 130 MMHG | TEMPERATURE: 97.5 F | WEIGHT: 106 LBS | BODY MASS INDEX: 18.1 KG/M2 | DIASTOLIC BLOOD PRESSURE: 70 MMHG | OXYGEN SATURATION: 95 % | RESPIRATION RATE: 16 BRPM

## 2023-04-06 DIAGNOSIS — E78.2 MIXED HYPERLIPIDEMIA: ICD-10-CM

## 2023-04-06 DIAGNOSIS — E03.9 HYPOTHYROIDISM, UNSPECIFIED TYPE: Primary | ICD-10-CM

## 2023-04-06 DIAGNOSIS — N18.31 STAGE 3A CHRONIC KIDNEY DISEASE (HCC): ICD-10-CM

## 2023-04-06 DIAGNOSIS — R73.09 ELEVATED GLUCOSE: ICD-10-CM

## 2023-04-06 DIAGNOSIS — R20.0 NUMBNESS: ICD-10-CM

## 2023-04-06 PROBLEM — R79.89 ELEVATED SERUM CREATININE: Status: RESOLVED | Noted: 2019-02-07 | Resolved: 2023-04-06

## 2023-04-06 RX ORDER — ALBENDAZOLE 200 MG/1
200 TABLET, FILM COATED ORAL
COMMUNITY
Start: 2023-01-04

## 2023-04-06 NOTE — PROGRESS NOTES
Name: Arnoldo Mendoza      : 1945      MRN: [id number]  Encounter Provider: Nish Lee MD  Encounter Date: 2023   Encounter department: [address] [place] [address]     1  Hypothyroidism, unspecified type  Assessment & Plan:  Reviewed labs with patient, TSH is within range and she will continue current dose of levothyroxine  2  Stage 3a chronic kidney disease ([facility] [address] )  Assessment & Plan:  Lab Results   Component Value Date    EGFR 58 (L) 2023    EGFR 55 (L) 2022    EGFR 54 2018    CREATININE 0 99 2023    CREATININE 1 04 (H) 2022    CREATININE 1 03 (H) 2022     This is stable and she will continue to try to increase fluid intake  Orders:  -     CBC and differential; Future  -     Comprehensive metabolic panel; Future  -     Lipid panel; Future  -     HEMOGLOBIN A1C W/ EAG ESTIMATION; Future    3  Mixed hyperlipidemia  Assessment & Plan:  Reviewed labs and lipids are within goal, continue atorvastatin as ordered  Orders:  -     CBC and differential; Future  -     Comprehensive metabolic panel; Future  -     Lipid panel; Future  -     HEMOGLOBIN A1C W/ EAG ESTIMATION; Future    4  Elevated glucose  Assessment & Plan:  Reviewed labs and glucose is stable  Continue dietary and exercise efforts  Orders:  -     CBC and differential; Future  -     Comprehensive metabolic panel; Future  -     Lipid panel; Future  -     HEMOGLOBIN A1C W/ EAG ESTIMATION; Future    5  Numbness  Assessment & Plan:  Suspect nerve root impingement with certain positions, she defers xray but will try positioning/extra padding on her carseat and f/u if not improving  Subjective      Here for follow up visit  She has been taking her bp in the mornings and they have been 100-134/57-79  She feels well, 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  Denies chest pain or dyspnea    There is no cold or heat intolerance, diarrhea or constipation, hair or "skin changes, unanticipated weight gain or loss  Has been having some right foot and calf heaviness with sitting or reclining  It can affect her in the car  She denies back pain or sciatic pain  No prior injury  Review of Systems   Constitutional: Negative  Respiratory: Negative  Cardiovascular: Negative  Endocrine: Negative  Musculoskeletal: Negative for back pain  Neurological: Positive for numbness  Current Outpatient Medications on File Prior to Visit   Medication Sig   • atorvastatin (LIPITOR) 10 mg tablet take 1 tablet by mouth every morning   • brinzolamide (AZOPT) 1 % ophthalmic suspension Administer to the right eye 2 (two) times a day    • clobetasol (TEMOVATE) 0 05 % ointment Apply topically 2 (two) times a day   • famotidine (PEPCID) 40 MG tablet take 1 tablet by mouth once daily   • fluocinolone acetonide (DERMOTIC) 0 01 % otic oil Administer 3 drops into both ears 2 (two) times a day as needed (for itchy ears)   • levothyroxine 50 mcg tablet take 1 tablet by mouth every morning   • travoprost (TRAVATAN-Z) 0 004 % ophthalmic solution Apply 1 drop to eye daily at bedtime Both eyes   • albendazole (ALBENZA) 200 mg tablet Take 200 mg by mouth   • [DISCONTINUED] onabotulinumtoxin A (Botox) 100 units Cervical Dystonia (Patient not taking: Reported on 11/3/2022)       Objective     /70 (BP Location: Right arm, Patient Position: Sitting, Cuff Size: Standard)   Pulse 100   Temp 97 5 °F (36 4 °C) (Temporal)   Resp 16   Ht 5' 4\" (1 626 m)   Wt 48 1 kg (106 lb)   SpO2 95%   BMI 18 19 kg/m²     Physical Exam  Constitutional:       Appearance: She is well-developed  Eyes:      Conjunctiva/sclera: Conjunctivae normal    Neck:      Thyroid: No thyromegaly  Vascular: No JVD  Cardiovascular:      Rate and Rhythm: Normal rate and regular rhythm  Heart sounds: Normal heart sounds  No murmur heard  No friction rub  No gallop     Pulmonary:      Effort: Pulmonary effort " is normal       Breath sounds: Normal breath sounds  No wheezing or rales  Abdominal:      General: Bowel sounds are normal  There is no distension  Palpations: Abdomen is soft  Tenderness: There is no abdominal tenderness  Musculoskeletal:      Cervical back: Neck supple  Lumbar back: Positive right straight leg raise test    Neurological:      General: No focal deficit present  Mental Status: She is oriented to person, place, and time         Alix Bernard MD

## 2023-04-06 NOTE — ASSESSMENT & PLAN NOTE
Lab Results   Component Value Date    EGFR 58 (L) 04/03/2023    EGFR 55 (L) 09/30/2022    EGFR 54 01/14/2018    CREATININE 0 99 04/03/2023    CREATININE 1 04 (H) 09/30/2022    CREATININE 1 03 (H) 03/16/2022     This is stable and she will continue to try to increase fluid intake

## 2023-04-06 NOTE — ASSESSMENT & PLAN NOTE
Suspect nerve root impingement with certain positions, she defers xray but will try positioning/extra padding on her carseat and f/u if not improving

## 2023-04-06 NOTE — ASSESSMENT & PLAN NOTE
Reviewed labs with patient, TSH is within range and she will continue current dose of levothyroxine

## 2023-04-17 DIAGNOSIS — M54.10 RADICULOPATHY, UNSPECIFIED SPINAL REGION: Primary | ICD-10-CM

## 2023-04-24 ENCOUNTER — EVALUATION (OUTPATIENT)
Dept: PHYSICAL THERAPY | Facility: CLINIC | Age: 78
End: 2023-04-24

## 2023-04-24 DIAGNOSIS — R20.0 NUMBNESS OF RIGHT FOOT: Primary | ICD-10-CM

## 2023-04-24 DIAGNOSIS — M54.10 RADICULOPATHY, UNSPECIFIED SPINAL REGION: ICD-10-CM

## 2023-04-24 NOTE — PROGRESS NOTES
PT Evaluation   Today's date: 2023  Patient name: Flaca Donaldson  : 1945  MRN: [id number]  Referring provider: Hernan Connor MD  Dx:   Encounter Diagnosis     ICD-10-CM    1  Numbness of right foot  R20 0       2  Radiculopathy, unspecified spinal region  M54 10 Ambulatory Referral to Physical Therapy          Assessment    Flaca Donaldson is a pleasant 76 y o  female who presents with a referring diagnosis of radiculopathy  The patient's greatest concern is numbness in her Rt foot only present when driving  Differential diagnosis for this patient's presentation includes diabetic neuropathy, neurogenic claudication, adverse neural tension, and S1 nerve root compression  The patient would benefit referral to pain and spine should numbness persist for possible EMG study  Positive prognostic indicators include positive attitude and acuity of symptoms  Negative prognostic indicators include lack of centralization with movement  Impairments: Impaired physical strength, lacks appropriate HEP, abnormal movement, poor posture and poor body mechanics    Symptom Irritability: low    Concordant Sign:  - plantarflexion strength (pressing down on gas)    Patient education performed this session included: home exercise program, plan of care, expected tissue healing time and prognosis and diagnosis    Patient verbalized good understanding of POC  Please contact me if you have any questions or recommendations  Thank you for the referral and the opportunity to share in [address] care         Plan    Patient would benefit from: Skilled PT  Planned modality interventions: biofeedback, cryotherapy, TENS and thermotherapy (hot pack)  Planned therapy interventions: activity modification, behavior modification, body mechanics training, functional ROM exercises, graded exercise, HEP, joint mobilization, manual therapy, Nascimento taping, motor coordination training, neuromuscular re-education, patient education, postural training, strengthening, stretching, therapeutic activities and therapeutic exercises    Frequency: 2x per week  Duration in weeks: 6 weeks    Plan of Care beginning date: 4/24/2023  Plan of Care expiration date: 6 weeks - 6/5/2023  Treatment plan discussed with: patient      Goals    Short Term Goals (3 weeks):    1  Patient will be independent with basic HEP  2  Patient will report >50% reduction in pain  3  Patient will demonstrate >1/3 improvement in MMT grade as applicable  4  Patient will be able to increase time to onset of symptoms when driving    Long Term Goals (6 weeks):  1  Patient will be independent in a comprehensive home exercise program  2  Patient FOTO score will improve by 10 points  3  Patient will self-report >75% improvement in function  4  Patient will be able to reduce frequency of episodes when driving      History    History of Present Illness  Mechanism of injury: Patient reports Rt foot numbness only when she she is driving  She states that she has no back pain or history of back pain  She has had no surgeries on that side and denies SHREYA  She states that the only thing she has noticed is three instances of sharp lateral Rt calf pain that only occurs briefly and then dissipated quickly       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 self correction    Sensation  Light touch: intact bilaterally    Reflexes:     LEFT/RIGHT  Patellar: 2+  / 2+   Achilles: 2+  / 2+     Upper Motor Neuron Signs: none    Lumbar Spine ROM   4/24/2023    Flexion 100%    Extension 50%    Lt Rotation 75%    Rt Rotation 75% Lt Lateral Flexion 60%    Rt Lateral Flexion 60%     (*) = reproduction of patient's reported pain    LE MMT   4/24/2023    LEFT RIGHT     Hip Flexion 4/5 4/5    Hip Extension 4/5 4/5    Hip Abduction 4/5 4/5    Hip Adduction 4/5 4/5       Knee Flexion 4/5 4/5    Knee Extension 4/5 4/5       Ankle DF 5/5 5/5    Ankle PF 3/5 5/5     (*) = reproduction of patient's reported pain    Mechanical Assessment   4/24/2023    During testing After testing Effect    RFIS No change No change NE    BRISEIDA No change No change Improved PF strength       REIL No change No change  Improved PF strength and ext ROM     Flexibility   4/24/2023    LEFT RIGHT     Hamstring 85 85     Palpation  No TTP noted    Diagnostic Tests Performed  (-): passive SLR (on B/L) and slump (on B/L)           Insurance:  AMA/CMS Eval/ Re-eval Auth #/ Referral # Total units  Start date  Expiration date Extension  Visit limitation? PT only or  PT+OT? Co-Insurance   CMS (aetna [facility]) 4/24/2023 No auth needed     BOMN  $10 copay                                                     POC Start Date POC Expiration Date Signed POC?   4/24/2023 6 weeks - 6/5/2023       Date               Units:  Used               Authed:  Remaining                  Precautions:   Past Medical History:   Diagnosis Date   • Colon polyp    • COVID-19 02/2021    cough, lost sense of taste for a few days   • COVID-19 vaccine series completed    • Diabetes mellitus ([address] )    • Disease of thyroid gland     hypo   • Ear problems approx   2019    impacted wax   • Essential tremor     hands and head   • GERD (gastroesophageal reflux disease)    • Glaucoma     both eyes   • HL (hearing loss) around 2012   • Hyperlipidemia    • Osteoporosis    • Persistent dry cough     d/t acid reflux   • Pre-diabetes    • 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 , 2021   • Wears glasses    • Wears hearing aid     darien  ears       Date 4/24/2023        Visit Number IE        FOTO Tracking Intake survey     Follow-up #1   Manual Thoracic mobs         Lumbar mobs                                    TherEx         LTR         SKTC/DKTC                                                               Neuro Re-Ed         TrA isos         Repeated movements PPU 10x 5x per day    BRISEIDA 10x prior to getting into car                                                              TherAct         Patient education HEP and POC        Posture education         Lifting mechanics                           Gait Training                                    Modalities

## 2023-04-27 ENCOUNTER — OFFICE VISIT (OUTPATIENT)
Dept: PHYSICAL THERAPY | Facility: CLINIC | Age: 78
End: 2023-04-27

## 2023-04-27 DIAGNOSIS — R20.0 NUMBNESS OF RIGHT FOOT: Primary | ICD-10-CM

## 2023-04-27 DIAGNOSIS — M54.10 RADICULOPATHY, UNSPECIFIED SPINAL REGION: ICD-10-CM

## 2023-04-27 NOTE — PROGRESS NOTES
Daily Note     Today's date: 2023  Patient name: Shira Fritz  : 1945  MRN: [id number]  Referring provider: Es Ram MD  Dx:   Encounter Diagnosis     ICD-10-CM    1  Numbness of right foot  R20 0       2  Radiculopathy, unspecified spinal region  M54 10           Start Time: 1500  Stop Time: 1525  Total time in clinic (min): 25 minutes    Subjective: Patient reports slight increase in existing anterior Lt hip pain with extension exercises, but possibly an improvement in the numbness  Objective: See treatment diary below      Assessment: Tolerated treatment well  Reintroduced PPU on the table with patient reporting decreased anterior hip pain following activities and improvement in overall smoothness of gait  Reviewed neutral lumbar posture especially when driving to prevent foot numbness  Patient would benefit from continued PT to address functional mobility and strength in order to return to PLOF  Plan: Continue per plan of care  Insurance:  AMA/CMS Miki/ Oscar Farris #/ Referral # Total units  Start date  Expiration date Extension  Visit limitation? PT only or  PT+OT? Co-Insurance   CMS (aetna [facility]) 2023 No auth needed     BOMN  $10 copay                                                     POC Start Date POC Expiration Date Signed POC?   2023 6 weeks - 2023       Date               Units:  Used               Authed:  Remaining                  Precautions:   Past Medical History:   Diagnosis Date   • Colon polyp    • COVID-19 2021    cough, lost sense of taste for a few days   • COVID-19 vaccine series completed    • Diabetes mellitus ([facility] [address] )    • Disease of thyroid gland     hypo   • Ear problems approx   2019    impacted wax   • Essential tremor     hands and head   • GERD (gastroesophageal reflux disease)    • Glaucoma     both eyes   • HL (hearing loss) around    • Hyperlipidemia    • Osteoporosis    • Persistent dry cough     d/t acid reflux   • Pre-diabetes    • 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 , 2021   • Wears glasses    • Wears hearing aid     darien  ears       Date 4/24/2023 4/27/23       Visit Number IE 2       FOTO Tracking Intake survey     Follow-up #1   Manual         Thoracic mobs         Lumbar mobs                                    TherEx         LTR         SKTC/DKTC                                                               Neuro Re-Ed         TrA isos         Repeated movements PPU 10x 5x per day    BRISEIDA 10x prior to getting into car PPU 5x10    NANNETTE 2 x 2 min                                                             TherAct         Patient education HEP and POC        Posture education  Lumbar posture in car x 10 min       Lifting mechanics                           Gait Training                                    Modalities

## 2023-04-28 ENCOUNTER — TELEPHONE (OUTPATIENT)
Dept: AUDIOLOGY | Facility: CLINIC | Age: 78
End: 2023-04-28

## 2023-04-28 NOTE — TELEPHONE ENCOUNTER
Spoke to patient  Russel Ferrell should be delivered next week  Pt  Was offered to see another audiologist next week as I will be OOO  Pt  Requested that she stay with me  Pt  Scheduled for following week upon my return from vacation  Liu Corral    Clinical Audiologist    [id number] [address] [id number]

## 2023-05-01 ENCOUNTER — OFFICE VISIT (OUTPATIENT)
Dept: PHYSICAL THERAPY | Facility: CLINIC | Age: 78
End: 2023-05-01

## 2023-05-01 DIAGNOSIS — M54.10 RADICULOPATHY, UNSPECIFIED SPINAL REGION: ICD-10-CM

## 2023-05-01 DIAGNOSIS — R20.0 NUMBNESS OF RIGHT FOOT: Primary | ICD-10-CM

## 2023-05-01 NOTE — PROGRESS NOTES
Daily Note     Today's date: 2023  Patient name: Fabio Amador  : 1945  MRN: [id number]  Referring provider: Miguelina Issa MD  Dx:   Encounter Diagnosis     ICD-10-CM    1  Numbness of right foot  R20 0       2  Radiculopathy, unspecified spinal region  M54 10           Start Time: 1500  Stop Time: 1540  Total time in clinic (min): 40 minutes    Patient was co-treated by a physical therapy student, Jodi Hernandez, under my direct supervision  Subjective: Pt reports that she had significant pain in her R groin, R buttock, and R thigh that was made worse with a towel behind her back as she was driving  She reports the pain to also be significant when she is laying in bed  Objective: See treatment diary below      Assessment: Tolerated treatment fair  Repeated extensions abolish numbness in her R foot and increase pain in her L buttock  Hip flexor stretching improves R groin pain but increased buttock pain  Discussed posture and how it contributes to her pain  Discussed changes to her HEP to include standing extensions I addition to prone extensions for a total of 5-6 sets per day  Patient demonstrated fatigue post treatment and would benefit from continued PT to maximize function and promote return to PLOF  Plan: Continue per plan of care  Progress treatment as tolerated  Insurance:  AMA/CMS Eval/ Johann Door #/ Referral # Total units  Start date  Expiration date Extension  Visit limitation? PT only or  PT+OT?  Co-Insurance   CMS (aetna [facility]) 2023 No auth needed     BOMN  $10 copay                                                     POC Start Date POC Expiration Date Signed POC?   2023 6 weeks - 2023       Date               Units:  Used               Authed:  Remaining                  Precautions:   Past Medical History:   Diagnosis Date    Colon polyp     COVID-19 2021    cough, lost sense of taste for a few days    COVID-19 vaccine series completed     "Diabetes mellitus ([facility] [address] )     Disease of thyroid gland     hypo    Ear problems approx   2019    impacted wax    Essential tremor     hands and head    GERD (gastroesophageal reflux disease)     Glaucoma     both eyes    HL (hearing loss) around 2012    Hyperlipidemia     Osteoporosis     Persistent dry cough     d/t acid reflux    Pre-diabetes     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 , 2021    Wears glasses     Wears hearing aid     darien  ears       Date 4/24/2023 4/27/23 5/1/23      Visit Number IE 2 3      FOTO Tracking Intake survey     Follow-up #1   Manual         Thoracic mobs         Lumbar mobs                                    TherEx         LTR         SKTC/DKTC                                                               Neuro Re-Ed         TrA isos         Repeated movements PPU 10x 5x per day    BRISEIDA 10x prior to getting into car PPU 5x10    NANNETTE 2 x 2 min PPU 5x10    BRISEIDA 4x10    Extension over half roll x10                                                            TherAct         Patient education HEP and POC        Posture education  Lumbar posture in car x 10 min HEP and POC and posture      Lifting mechanics         Stretch   R piriformis 30\"x3    R hip flexor 10\"x10               Gait Training                                    Modalities                             "

## 2023-05-04 ENCOUNTER — OFFICE VISIT (OUTPATIENT)
Dept: PHYSICAL THERAPY | Facility: CLINIC | Age: 78
End: 2023-05-04

## 2023-05-04 ENCOUNTER — HOSPITAL ENCOUNTER (EMERGENCY)
Facility: HOSPITAL | Age: 78
Discharge: HOME | End: 2023-05-04
Attending: EMERGENCY MEDICINE

## 2023-05-04 ENCOUNTER — OFFICE VISIT (OUTPATIENT)
Dept: OTOLARYNGOLOGY | Facility: CLINIC | Age: 78
End: 2023-05-04

## 2023-05-04 ENCOUNTER — APPOINTMENT (EMERGENCY)
Dept: RADIOLOGY | Facility: HOSPITAL | Age: 78
End: 2023-05-04

## 2023-05-04 ENCOUNTER — TELEPHONE (OUTPATIENT)
Dept: FAMILY MEDICINE CLINIC | Facility: CLINIC | Age: 78
End: 2023-05-04

## 2023-05-04 VITALS — WEIGHT: 103 LBS | TEMPERATURE: 97.8 F | BODY MASS INDEX: 17.58 KG/M2 | HEIGHT: 64 IN

## 2023-05-04 VITALS
HEART RATE: 77 BPM | OXYGEN SATURATION: 98 % | DIASTOLIC BLOOD PRESSURE: 86 MMHG | RESPIRATION RATE: 18 BRPM | SYSTOLIC BLOOD PRESSURE: 203 MMHG | TEMPERATURE: 97.2 F

## 2023-05-04 DIAGNOSIS — R20.0 NUMBNESS OF RIGHT FOOT: Primary | ICD-10-CM

## 2023-05-04 DIAGNOSIS — M54.10 RADICULOPATHY, UNSPECIFIED SPINAL REGION: ICD-10-CM

## 2023-05-04 DIAGNOSIS — H61.23 BILATERAL IMPACTED CERUMEN: Primary | ICD-10-CM

## 2023-05-04 DIAGNOSIS — M54.9 BACK PAIN: Primary | ICD-10-CM

## 2023-05-04 DIAGNOSIS — R20.2 PARESTHESIAS: ICD-10-CM

## 2023-05-04 RX ORDER — PREDNISONE 20 MG/1
40 TABLET ORAL DAILY
Qty: 10 TABLET | Refills: 0 | Status: SHIPPED | OUTPATIENT
Start: 2023-05-04 | End: 2023-05-09

## 2023-05-04 RX ORDER — LIDOCAINE 50 MG/G
1 PATCH TOPICAL ONCE
Status: DISCONTINUED | OUTPATIENT
Start: 2023-05-04 | End: 2023-05-04 | Stop reason: HOSPADM

## 2023-05-04 RX ORDER — LIDOCAINE 50 MG/G
1 PATCH TOPICAL DAILY
Qty: 30 PATCH | Refills: 0 | Status: SHIPPED | OUTPATIENT
Start: 2023-05-04

## 2023-05-04 RX ADMIN — LIDOCAINE 5% 1 PATCH: 700 PATCH TOPICAL at 19:13

## 2023-05-04 NOTE — PROGRESS NOTES
"Assessment/Plan:  Cerumen removed  F/u 6 months  Diagnosis ICD-10-CM Associated Orders   1  Bilateral impacted cerumen  H61 23              Subjective:      Patient ID: Sea Nixon is a 76 y o  female  Cerumen impaction  The DermOtic drops have helped her itching  The following portions of the patient's history were reviewed and updated as appropriate: allergies, current medications, past family history, past medical history, past social history, past surgical history and problem list     Review of Systems      Objective:      Temp 97 8 °F (36 6 °C) (Temporal)   Ht 5' 4\" (1 626 m)   Wt 46 7 kg (103 lb)   BMI 17 68 kg/m²          Physical Exam  HENT:      Right Ear: Tympanic membrane, ear canal and external ear normal  There is impacted cerumen  Left Ear: Tympanic membrane, ear canal and external ear normal  There is impacted cerumen  PROCEDURE: Cerumen removal from ears  Impacted cerumen removal was performed by Dr London Peoples and removed from both ears  The visualization instrument used was the operating otoscope and speculum  The ear cleaning instrument used was suction and alligator forceps  The outcome was successful and the patient tolerated the procedure well  There were no complications    "

## 2023-05-04 NOTE — TELEPHONE ENCOUNTER
Gato Islas, physical therapist from [facility] location [facility] letting us know they had her in PT today  She came initially for foot numbness, since progressed significantly  Today she presented with ataxic gate, Clonus bilaterally positive Babinski on her left side, scissoring gait, ataxic gate  Davie Donnelly is concerned about something more insidious going on as far as other symptoms  Patient states she feels alright but has pain in her leg  Davie Donnelly is concerned about these reflexes  They have some hyper reflexive lower extremity reflexes as well  Asking for call back  If  could let me know as soon as possible, really what we could do? You can reach her after hours at [phone] or during regular hours at [phone]    Thank you

## 2023-05-04 NOTE — PROGRESS NOTES
Daily Note     Today's date: 2023  Patient name: Cole Kat  : 1945  MRN: [id number]  Referring provider: Sara Yanes MD  Dx:   Encounter Diagnosis     ICD-10-CM    1  Numbness of right foot  R20 0       2  Radiculopathy, unspecified spinal region  M54 10           Start Time: 1515  Stop Time: 1600  Total time in clinic (min): 45 minutes    Patient was co-treated by a physical therapy student, Raysa Barnes, under my direct supervision  Subjective: Pt reports constant pain in her R hip on the side  She reported numbness into her R leg that was constant and unrelenting  Objective: See treatment diary below  Pt demonstrates significant gait impairments compared to previous sessions  During amb she demonstrates scissoring, decreased righting reactions, unequal step lengths bilaterally, decreased foot clearance bilaterally, abhorrent arm movements, truncal ataxia, and difficulty with turning while on her feet  She demonstrated brisk reflexes (3+) for bilateral patellar reflexes, bilateral achilles were normal (2+), difficulty to elicit UE DTR due to tremors, (-) hermosillo's bilaterally, (+) Babinski on L and (-) Babinski on R, 2 beat clonus bilaterally  Rhomberg with eyes open is normal with mild sway, eyes closed is (+) and needed assistance to prevent a fall  As stated above, ambulation included significant scissoring of gait with lateral deviation specifically to the Rt  Cervical Myelopathy:   Ataxic gait   Age > 45 years   (+) Hermosillo's Test   (+) Babinski Test   (+) Inverted Supinator Sign    1 positive: Sn  94, Sp  31, +LR 1 4  2 positive: Sn  39, Sp  88, +LR 3 3  3 positive: Sn  19, Sp  99, +LR 30 9  4 positive: Sn  09, Sp 1 0, +LR infinite      Assessment: Patient presented today with significant progression of symptoms, specifically abnormalities in gait and unrelenting unilateral numbness that was not present in previous sessions   Tested patient's reflexes as well as pathological reflexes and clonus  Given patient presentation, significant progression of symptoms, and inability to contact PCP, patient was referred to ED for possible imaging of the cervical spine due to concerns of cervical myelopathy  Patient was amenable to this and refused transport  She was escorted to her vehicle for safety following session  Patient will be assessed to determine if continuation of PT is safe given the change in her status of neurological symptoms/presentation  Plan: Hold PT until patient is stable  Insurance:  AMA/CMS Miki/ Arnulfo George #/ Referral # Total units  Start date  Expiration date Extension  Visit limitation? PT only or  PT+OT? Co-Insurance   CMS (aetna [facility]) 4/24/2023 No auth needed     BOMN  $10 copay                                                     POC Start Date POC Expiration Date Signed POC?   4/24/2023 6 weeks - 6/5/2023       Date               Units:  Used               Authed:  Remaining                  Precautions:   Past Medical History:   Diagnosis Date    Colon polyp     COVID-19 02/2021    cough, lost sense of taste for a few days    COVID-19 vaccine series completed     Diabetes mellitus ([address] )     Disease of thyroid gland     hypo    Ear problems approx   2019    impacted wax    Essential tremor     hands and head    GERD (gastroesophageal reflux disease)     Glaucoma     both eyes    HL (hearing loss) around 2012    Hyperlipidemia     Osteoporosis     Persistent dry cough     d/t acid reflux    Pre-diabetes     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 , 2021    Wears glasses     Wears hearing aid     darien  ears       Date 4/24/2023 4/27/23 5/1/23 5/4/23     Visit Number IE 2 3 4     FOTO Tracking Intake survey     Follow-up #1   Manual         Thoracic mobs         Lumbar mobs                                    TherEx         LTR         SKTC/DKTC                                                               Neuro Re-Ed         TrA isos         Repeated "movements PPU 10x 5x per day    BRISEIDA 10x prior to getting into car PPU 5x10    NANNETTE 2 x 2 min PPU 5x10    BRISEIDA 4x10    Extension over half roll x10 BRISEIDA 2x10     SIJ Opposition    5x5\" each direction                                                  TherAct         Patient education HEP and POC        Posture education  Lumbar posture in car x 10 min HEP and POC and posture Reassess, reflexes, concerns, and referral to ED x 20 min     Lifting mechanics         Stretch   R piriformis 30\"x3    R hip flexor 10\"x10 STB R glute (superior aspect) x5 min              Gait Training                                    Modalities                               "

## 2023-05-05 ENCOUNTER — TELEPHONE (OUTPATIENT)
Dept: PHYSICAL THERAPY | Facility: OTHER | Age: 78
End: 2023-05-05

## 2023-05-05 ENCOUNTER — TELEPHONE (OUTPATIENT)
Dept: FAMILY MEDICINE CLINIC | Facility: CLINIC | Age: 78
End: 2023-05-05

## 2023-05-05 NOTE — TELEPHONE ENCOUNTER
Patient stated that she has a tremor and needs to be sedated for a MRI  [facility] Gard[facility] does not do MRI sedation per patient  She can't drive to [place] because her foot gets numb and back hurts if she drives too long  I stated I would let Dr Sandra Casanova know   Tejinder Kristan is not sure if she can get MRI because of these reasons

## 2023-05-05 NOTE — TELEPHONE ENCOUNTER
Call placed to the patient per [facility] referral     V/M left for patient to call back  Phone number and hours of business provided  This is the 1st attempt to reach the patient  Will defer referral per protocol  Note: patient currently in PT for Radiculopathy , unspecified spinal region

## 2023-05-05 NOTE — TELEPHONE ENCOUNTER
LEFT MESSAGE for [facility] regarding her making an apt with Dr Yusuf Richardson would like to see her before ordering MRI of back  MRI is recommended by PT  Dr Gregoria Richardson stated if she does not have apts, Brendan can see another provider

## 2023-05-06 NOTE — ED PROVIDER NOTES
History  Chief Complaint   Patient presents with    Numbness     See Epic note PCP office  Seen at PT and call placed to PCP regarding foot numbness, ataxic gait, reflexes  Patient state having right lower back pain      Patient presents for evaluation of lower back pain with numbness in her right foot  Initially when she started going to physical therapy a couple weeks ago she only had intermittent numbness in her right foot however now it is constant  Pain seems worse since starting physical therapy  Physical therapist was concerned about the progression of her symptoms and possible cord compression  Patient states is difficult to walk because her foot is numb she also has a history of an essential tremor since 1976 she follows up with neurology for  Denies any bowel or bladder dysfunction  History provided by:  Patient   used: No        Prior to Admission Medications   Prescriptions Last Dose Informant Patient Reported? Taking?    albendazole (ALBENZA) 200 mg tablet   Yes No   Sig: Take 200 mg by mouth   atorvastatin (LIPITOR) 10 mg tablet   No No   Sig: take 1 tablet by mouth every morning   brinzolamide (AZOPT) 1 % ophthalmic suspension   Yes No   Sig: Administer to the right eye 2 (two) times a day    clobetasol (TEMOVATE) 0 05 % ointment   No No   Sig: Apply topically 2 (two) times a day   famotidine (PEPCID) 40 MG tablet   No No   Sig: take 1 tablet by mouth once daily   fluocinolone acetonide (DERMOTIC) 0 01 % otic oil   No No   Sig: Administer 3 drops into both ears 2 (two) times a day as needed (for itchy ears)   levothyroxine 50 mcg tablet   No No   Sig: take 1 tablet by mouth every morning   travoprost (TRAVATAN-Z) 0 004 % ophthalmic solution   Yes No   Sig: Apply 1 drop to eye daily at bedtime Both eyes      Facility-Administered Medications: None       Past Medical History:   Diagnosis Date    Colon polyp     COVID-19 02/2021    cough, lost sense of taste for a few days  COVID-19 vaccine series completed     Diabetes mellitus [address] [address]     Disease of thyroid gland     hypo    Ear problems approx  2019    impacted wax    Essential tremor     hands and head    GERD (gastroesophageal reflux disease)     Glaucoma     both eyes    HL (hearing loss) around     Hyperlipidemia     Osteoporosis     Persistent dry cough     d/t acid reflux    Pre-diabetes     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 , 2021    Wears glasses     Wears hearing aid     darien  ears       Past Surgical History:   Procedure Laterality Date    CATARACT EXTRACTION Bilateral     COLONOSCOPY      EGD      UPPER GASTROINTESTINAL ENDOSCOPY         Family History   Problem Relation Age of Onset    Kidney disease Mother     Throat cancer Father     Diabetes Sister     Rheum arthritis Sister     Diabetes Brother         pre-diabetes    Rheum arthritis Brother     Diabetes Brother         DM    Arthritis Brother     Diabetes Brother     Cancer Brother     Cancer Maternal Grandmother         breast    Diabetes Paternal Grandmother     Dystonia Neg Hx     Tremor Neg Hx      I have reviewed and agree with the history as documented  E-Cigarette/Vaping    E-Cigarette Use Never User      E-Cigarette/Vaping Substances    Nicotine No     THC No     CBD No     Flavoring No     Other No     Unknown No      Social History     Tobacco Use    Smoking status: Former     Packs/day: 1 00     Years: 30 00     Pack years: 30      Types: Cigarettes     Start date: 1961     Quit date: 1991     Years since quittin 7    Smokeless tobacco: Never   Vaping Use    Vaping Use: Never used   Substance Use Topics    Alcohol use: Yes     Alcohol/week: 10 0 standard drinks     Types: 10 Cans of beer per week     Comment: I usually drink a can of beer  or two after dinner    Drug use: Never       Review of Systems   Musculoskeletal: Positive for back pain  Neurological: Positive for numbness     All other systems reviewed and are negative  Physical Exam  Physical Exam  Vitals and nursing note reviewed  Constitutional:       General: She is not in acute distress  Eyes:      General: No scleral icterus  Extraocular Movements: Extraocular movements intact  Conjunctiva/sclera: Conjunctivae normal       Pupils: Pupils are equal, round, and reactive to light  Cardiovascular:      Rate and Rhythm: Normal rate and regular rhythm  Pulmonary:      Effort: Pulmonary effort is normal  No respiratory distress  Breath sounds: Normal breath sounds  Abdominal:      General: Abdomen is flat  Bowel sounds are normal  There is no distension  Palpations: Abdomen is soft  Tenderness: There is no abdominal tenderness  There is no guarding or rebound  Musculoskeletal:         General: Tenderness present  Normal range of motion  Back:    Neurological:      General: No focal deficit present  Mental Status: She is alert and oriented to person, place, and time  Sensory: No sensory deficit  Motor: No weakness        Coordination: Coordination normal       Comments: Antalgic gait but steady requiring no assistance, patient reports decreased sensation in her right foot but can still feel me touching her she can still tell which toes being touched and her proprioception is intact that she can tell whether it is up or down         Vital Signs  ED Triage Vitals [05/04/23 1831]   Temperature Pulse Respirations Blood Pressure SpO2   (!) 97 2 °F (36 2 °C) 77 18 (!) 203/86 98 %      Temp Source Heart Rate Source Patient Position - Orthostatic VS BP Location FiO2 (%)   Tympanic Monitor Sitting Left arm --      Pain Score       8           Vitals:    05/04/23 1831   BP: (!) 203/86   Pulse: 77   Patient Position - Orthostatic VS: Sitting         Visual Acuity      ED Medications  Medications - No data to display    Diagnostic Studies  Results Reviewed     None                 CT lumbar spine without contrast   Final Result by Starla Aguirre MD (05/04 2056)      Degenerative changes as described above  Right foraminal/extraforaminal L3-L4 disc protrusion causing moderate to severe right neuroforaminal narrowing impinging on the exiting right L3 nerve root  Bilateral L5 pars defect with grade 1 anterolisthesis of L5 on S1  Workstation performed: [id number]                    Procedures  Procedures         ED Course                               SBIRT 22yo+    Flowsheet Row Most Recent Value   Initial Alcohol Screen: US AUDIT-C     1  How often do you have a drink containing alcohol? 6 Filed at: 05/04/2023 1834   2  How many drinks containing alcohol do you have on a typical day you are drinking? 0 Filed at: 05/04/2023 1834   3b  FEMALE Any Age, or MALE 65+: How often do you have 4 or more drinks on one occassion? 0 Filed at: 05/04/2023 1834   Audit-C Score 6 Filed at: 05/04/2023 5861   MANOLO: How many times in the past year have you    Used an illegal drug or used a prescription medication for non-medical reasons? Never Filed at: 05/04/2023 1834                    Medical Decision Making  Pulse ox 98% on room air indicating adequate oxygenation  On reexam patient is sitting in the chair dressed and ready to go  Discussed CT results and L3 with nerve root compression  Patient given spine follow-up  Patient did not want any strong pain medications but will try a course of steroids  Patient's exam is not consistent with spinal cord compression  Amount and/or Complexity of Data Reviewed  Radiology: ordered  Risk  Prescription drug management            Disposition  Final diagnoses:   Back pain   Paresthesias     Time reflects when diagnosis was documented in both MDM as applicable and the Disposition within this note     Time User Action Codes Description Comment    5/4/2023  9:00 PM Wendy White Add [M54 9] Back pain     5/4/2023  9:03 PM Kera Byrd Add [R20 2] Paresthesias       ED Disposition     ED Disposition   Discharge    Condition   Stable    Date/Time   Thu May 4, 2023  9:00 PM    Comment   Italia Evans discharge to home/self care                 Follow-up Information     Follow up With Specialties Details Why Contact Info    Prisca Vieira MD Neurosurgery In 1 week  [address] [facility] [address] [id number] [phone]            Discharge Medication List as of 5/4/2023  9:05 PM      START taking these medications    Details   lidocaine (Lidoderm) 5 % Apply 1 patch topically over 12 hours daily Remove & Discard patch within 12 hours or as directed by MD, Starting Thu 5/4/2023, Normal      predniSONE 20 mg tablet Take 2 tablets (40 mg total) by mouth daily for 5 days, Starting Thu 5/4/2023, Until Tue 5/9/2023, Normal         CONTINUE these medications which have NOT CHANGED    Details   albendazole (ALBENZA) 200 mg tablet Take 200 mg by mouth, Starting Wed 1/4/2023, Historical Med      atorvastatin (LIPITOR) 10 mg tablet take 1 tablet by mouth every morning, Normal      brinzolamide (AZOPT) 1 % ophthalmic suspension Administer to the right eye 2 (two) times a day , Historical Med      clobetasol (TEMOVATE) 0 05 % ointment Apply topically 2 (two) times a day, Starting Tue 1/3/2023, Normal      famotidine (PEPCID) 40 MG tablet take 1 tablet by mouth once daily, Normal      fluocinolone acetonide (DERMOTIC) 0 01 % otic oil Administer 3 drops into both ears 2 (two) times a day as needed (for itchy ears), Starting Thu 11/3/2022, Normal      levothyroxine 50 mcg tablet take 1 tablet by mouth every morning, Normal      travoprost (TRAVATAN-Z) 0 004 % ophthalmic solution Apply 1 drop to eye daily at bedtime Both eyes, Starting Thu 7/9/2009, Historical Med                 PDMP Review       Value Time User    PDMP Reviewed  Yes 12/27/2021  8:42 AM Patti Estrada MD          ED Provider  Electronically Signed by           Karson Duckworth DO  05/05/23 9895

## 2023-05-08 ENCOUNTER — OFFICE VISIT (OUTPATIENT)
Dept: AUDIOLOGY | Facility: CLINIC | Age: 78
End: 2023-05-08

## 2023-05-08 DIAGNOSIS — H90.3 SENSORINEURAL HEARING LOSS (SNHL), BILATERAL: Primary | ICD-10-CM

## 2023-05-08 NOTE — PROGRESS NOTES
"[place] Hearing Aid Fitting    Pricila Mendoza was seen today (5/8/2023) for a binaural hearing aid fitting of her [address] [id number] L70-R  in the canal (GAURANG) hearing aid(s)  Ms Dat Brown was unaccompanied to today's visit  Ms Dat Brown reported today to return her Oticon Real 2 miniRITEs and be fitted with new Phonak aids  At the onset of today's visit Ms Harris was shown the R R  [place] aids  She reported \"total happiness\" with not only their look but their physical fit  She was able to very easily manipulate the volume control, which was her primary complaint with the Oticon aids  She wished to proceed with the fitting today  Device Information    Hearing Aid Fitting Date: 5/8/2023       Left Device Right Device   Hearing Aid Make: Stuart Rocher   Hearing Aid Model: [place] L70-R   Serial Number: [id number]   Repair Warranty Expiration Date: 07/25/26 07/25/26   Loss/Damage Warranty Expiration Date:  07/25/26 07/25/26    Length: 2 2    Output: P P   Wax System: Cerushield Cerushield   Dome Size/Style: Small power Small power   Battery: Lithium-ion Rechargeable Lithium-ion Rechargeable   [place] Serial Number: N/A N/A   [place] Pope Date:  N/A N/A      Serial Number: [id number]  Accessories: N/A      Initial Hearing Aid Settings    Hearing aid(s) were programmed using [employer] software's Phonak Adaptive Digital 2 fitting formula and were validated by Ms Harris for perceived comfort levels  • Manual control button was set for manual volume control  • Hearing aids set to experience level 2 per Ms Harris's subjective listening preference    Feedback manager performed  Ms Dat Brown has slit EACs that are difficult to achieve proper insertion and retention with  Feedback margin is cutting into gain  This was improved by switching from vented dome to power dome, but issues still persist  A custom GAURANG mold was recommended   Ms Dat Brown wished to trial the current " configuration for 1 week prior to discussing further and deciding  Device Orientation    Ms Harris was counseled on device components and component function  Proper insertion and removal of the aid(s) was demonstrated  The importance of realistic expectations with expectation, especially in the presence of background noise, was emphasized  Hearing aids are assistive devices and are not designed to restore normal hearing sensitivity  The need for daily consistent usage (8-12 hours per day) for proper device acclimatization, as well as the importance of self-advocacy and practicing effective communication strategies was outlined  Effective communication strategies include:  1 ) Maintaining face-to-face communication, allowing for speechreading of facial expressions, lips, and gestures  2 ) Reducing background noise and distance between communication partners  3 ) Having communication partners reduce their rate of speech when appropriate  4 ) Beginning conversation by getting communication partner's attention and stating the topic, allowing for context clues to help fill in gaps in comprehension  5 ) Asking for rephrasing of missed aspects of conversation rather than asking for repetitions  Ms Leonides Holcomb was given the information booklet (or QR code pamphlet to the booklet) regarding hearing aid usage and operation, hearing aid cleaning tools, and hearing aid carrying case  Hearing aid repair and loss and damage warranties offered through the  were outlined thoroughly  Ms Leonides Holcomb agreed to the terms of sale listed on the purchase agreement containing device specifications, warranties, pricing information, as well as [facility] 45-day trial period timeline  After this period has elapsed, hearing aids cannot be returned  Recommendations & Follow-up    Ms Harris demonstrated understanding of the topics discussed   It appears that she has realistic expectations regarding the benefits and limitations with the use of amplification  The prognosis for successful hearing aid use is good  While improvement with amplification is expected, there will always be word understanding problems due to the inherent nature of hearing loss  These problems will be greater in background noise and adverse listening environments than in quiet situations  Ms Jose Gómez is to follow-up in 1 week for a hearing aid check within the trial period as scheduled  At this time of Ms Darrell Gonzales next visit, the following topics should be reviewed: wax guard removal/replacement, increasing adaptation/gain settings after initial acclimatization, activation of push button/toggle switches for manual volume control, as well as noise reductive features in [employer] software  The above recommendations were discussed with Ms Harris  It was a pleasure working with Ms Harris today  She was encouraged to contact the clinic with any further questions/concerns in the meantime  Oticon hearing aids returned  Phonak hearing aids dispensed  There is no net cost difference  Liu Sargent    Clinical Audiologist    [id number] [address] [id number]

## 2023-05-08 NOTE — TELEPHONE ENCOUNTER
Pt stated she doesn't want the MRI , she is not going to PT because it aggravates her back  Pt also states she doesn't need the MRI now   NFA

## 2023-05-10 ENCOUNTER — OFFICE VISIT (OUTPATIENT)
Dept: FAMILY MEDICINE CLINIC | Facility: CLINIC | Age: 78
End: 2023-05-10

## 2023-05-10 VITALS
DIASTOLIC BLOOD PRESSURE: 70 MMHG | BODY MASS INDEX: 17.75 KG/M2 | SYSTOLIC BLOOD PRESSURE: 140 MMHG | RESPIRATION RATE: 16 BRPM | HEIGHT: 64 IN | WEIGHT: 104 LBS | TEMPERATURE: 97.6 F | OXYGEN SATURATION: 97 % | HEART RATE: 90 BPM

## 2023-05-10 DIAGNOSIS — M54.17 LUMBOSACRAL RADICULOPATHY AT L3: Primary | ICD-10-CM

## 2023-05-10 DIAGNOSIS — M54.41 ACUTE RIGHT-SIDED LOW BACK PAIN WITH RIGHT-SIDED SCIATICA: ICD-10-CM

## 2023-05-10 NOTE — PATIENT INSTRUCTIONS
Check to see if interventional pain management will see you based on the CT findings or if you do need the MRI

## 2023-05-10 NOTE — PROGRESS NOTES
Assessment/Plan:    She will not be able to tolerate the MRI  Referral provided for interventional pain management, as she is not progressing with PT  Discussed extending Prednisone taper if her pain worsens again    1  Lumbosacral radiculopathy at L3  -     Ambulatory Referral to Pain Management; Future    2  Acute right-sided low back pain with right-sided sciatica  -     Ambulatory Referral to Pain Management; Future          Patient Instructions   Check to see if interventional pain management will see you based on the CT findings or if you do need the MRI  No follow-ups on file  Subjective:      Patient ID: Xander Quigley is a [de-identified] y o  female  Chief Complaint   Patient presents with   • Numbness     In leg-wmcma       She has been experiencing numbness in her right leg  Initially started with her right foot going numb while driving  She has been going to PT and has been doing her exercises  She developed extreme pain in her right hip/groin region  Pain travels down the front of her right thigh  Also has pain in her right buttock and now her entire leg has been numb  Went to the ER, and was started on a steroid pack, which has been helping a lot    She had a CT done in the ER showing L3 bulging disc with stenosis       The following portions of the patient's history were reviewed and updated as appropriate: allergies, current medications, past family history, past medical history, past social history, past surgical history and problem list     Review of Systems      Current Outpatient Medications   Medication Sig Dispense Refill   • albendazole (ALBENZA) 200 mg tablet Take 200 mg by mouth     • atorvastatin (LIPITOR) 10 mg tablet take 1 tablet by mouth every morning 90 tablet 0   • brinzolamide (AZOPT) 1 % ophthalmic suspension Administer to the right eye 2 (two) times a day      • famotidine (PEPCID) 40 MG tablet take 1 tablet by mouth once daily 60 tablet 0   • fluocinolone acetonide "(DERMOTIC) 0 01 % otic oil Administer 3 drops into both ears 2 (two) times a day as needed (for itchy ears) 20 mL 11   • levothyroxine 50 mcg tablet take 1 tablet by mouth every morning 90 tablet 0   • lidocaine (Lidoderm) 5 % Apply 1 patch topically over 12 hours daily Remove & Discard patch within 12 hours or as directed by MD 30 patch 0   • travoprost (TRAVATAN-Z) 0 004 % ophthalmic solution Apply 1 drop to eye daily at bedtime Both eyes     • clobetasol (TEMOVATE) 0 05 % ointment Apply topically 2 (two) times a day (Patient not taking: Reported on 5/10/2023) 60 g 0   • predniSONE 10 mg tablet 4 pills daily x 3 days, 3 pills days x 3 days, 2 pills daily x 3 days, 1 pill daily x 3 days Take with food 30 tablet 0     No current facility-administered medications for this visit  Objective:    /70 (BP Location: Right arm, Patient Position: Sitting, Cuff Size: Standard)   Pulse 90   Temp 97 6 °F (36 4 °C) (Temporal)   Resp 16   Ht 5' 4\" (1 626 m)   Wt 47 2 kg (104 lb)   SpO2 97%   BMI 17 85 kg/m²        Physical Exam  Vitals and nursing note reviewed  Constitutional:       Appearance: Normal appearance  She is well-developed  Cardiovascular:      Rate and Rhythm: Normal rate and regular rhythm  Heart sounds: Normal heart sounds  No murmur heard  Pulmonary:      Effort: Pulmonary effort is normal       Breath sounds: Normal breath sounds  Skin:     General: Skin is warm and dry  Neurological:      Mental Status: She is alert     Psychiatric:         Mood and Affect: Mood normal          Behavior: Behavior normal                 KINDRA Grover  "

## 2023-05-11 ENCOUNTER — TELEPHONE (OUTPATIENT)
Dept: FAMILY MEDICINE CLINIC | Facility: CLINIC | Age: 78
End: 2023-05-11

## 2023-05-11 DIAGNOSIS — M54.17 LUMBOSACRAL RADICULOPATHY AT L3: Primary | ICD-10-CM

## 2023-05-11 RX ORDER — PREDNISONE 10 MG/1
TABLET ORAL
Qty: 30 TABLET | Refills: 0 | Status: SHIPPED | OUTPATIENT
Start: 2023-05-11 | End: 2023-05-27

## 2023-05-11 NOTE — TELEPHONE ENCOUNTER
Patient saw Trina Rodriguez yesterday and states that she is still in pain and needs more [employer] in [facility]

## 2023-05-15 ENCOUNTER — OFFICE VISIT (OUTPATIENT)
Dept: AUDIOLOGY | Facility: CLINIC | Age: 78
End: 2023-05-15

## 2023-05-15 ENCOUNTER — TELEPHONE (OUTPATIENT)
Dept: PAIN MEDICINE | Facility: CLINIC | Age: 78
End: 2023-05-15

## 2023-05-15 ENCOUNTER — CONSULT (OUTPATIENT)
Dept: PAIN MEDICINE | Facility: CLINIC | Age: 78
End: 2023-05-15

## 2023-05-15 VITALS
DIASTOLIC BLOOD PRESSURE: 84 MMHG | HEART RATE: 74 BPM | BODY MASS INDEX: 17.58 KG/M2 | SYSTOLIC BLOOD PRESSURE: 159 MMHG | HEIGHT: 64 IN | WEIGHT: 103 LBS

## 2023-05-15 DIAGNOSIS — H90.3 SENSORINEURAL HEARING LOSS (SNHL), BILATERAL: Primary | ICD-10-CM

## 2023-05-15 DIAGNOSIS — M43.16 SPONDYLOLISTHESIS OF LUMBAR REGION: ICD-10-CM

## 2023-05-15 DIAGNOSIS — M54.41 ACUTE RIGHT-SIDED LOW BACK PAIN WITH RIGHT-SIDED SCIATICA: Primary | ICD-10-CM

## 2023-05-15 DIAGNOSIS — M51.16 LUMBAR DISC HERNIATION WITH RADICULOPATHY: ICD-10-CM

## 2023-05-15 DIAGNOSIS — M51.36 LUMBAR DEGENERATIVE DISC DISEASE: ICD-10-CM

## 2023-05-15 PROBLEM — M51.369 LUMBAR DEGENERATIVE DISC DISEASE: Status: ACTIVE | Noted: 2023-05-15

## 2023-05-15 NOTE — TELEPHONE ENCOUNTER
Scheduled patient for TFESI 6/2/23  Patient denies RX blood thinners/ NSAIDS  Nothing to eat or drink 1 hour prior to procedure  Needs to arrange transportation  Proper clothing for procedure  No vaccines 2 weeks prior or after procedure  If ill or place on antibiotics, please call to reschedule

## 2023-05-15 NOTE — H&P (VIEW-ONLY)
Assessment  1  Acute right-sided low back pain with right-sided sciatica    2  Lumbar disc herniation with radiculopathy    3  Lumbar degenerative disc disease    4  Spondylolisthesis of lumbar region        Plan  The patient is complaining of numbness and pain in her low back and right lower extremity and what appears to be the right L4 and L5 distribution  She does have a herniated lumbar disc at the L3-L4 level, disc bulging at the L4-L5 level, and lumbar spondylolisthesis at the L5-S1 level  All of the CT of the lumbar spine findings were discussed in detail with the patient  At this point, I think she would benefit from a right L4 and L5 transforaminal epidural steroid injection  The procedures, its risks, and benefits were explained in detail to the patient  Risks include but are not limited to bleeding, infection, hematoma formation, abscess formation, weakness, headache, failure the pain to improve, nerve irritation or damage, and potential worsening of the pain  The patient verbalized understanding and wished to proceed with the procedure  I did discuss with the patient that if her pain improves, but her numbness does not improve after the injection, I would consider trialing neuromodulating medications for her  She is hesitant with using any medications for her pain at this point so I did feel it reasonable to trial interventional pain procedures first before any neuromodulating medications  My impressions and treatment recommendations were discussed in detail with the patient who verbalized understanding and had no further questions  Discharge instructions were provided  I personally saw and examined the patient and I agree with the above discussed plan of care  Follow-up is planned in 4 weeks time or sooner as warranted  History of Present Illness    Cande Monk is a [de-identified] y o  female presents to [facility] [address] [facility] for evaluation of the above-stated pain complaints  She is currently complaining of low back pain and right lower extremity radicular symptoms and what appears to be the right L4 and L5 distribution  She has a past medical and chronic pain history as outlined in the impression section  She was referred by Isa Severance, CRNP  At today's office visit, the patient's pain score is 2 out of 10 on the verbal numerical pain rating scale  The patient describes her pain as mild and intermittent in nature  She reports no typical pattern  She describes her pain as numbness  She does not ambulate with any assistive devices  There are no pain relieving factors currently  There are no worsening factors currently  Physical therapy provides no relief and actually made her pain significantly worse while she was doing it  She is not currently using any medications for her symptoms  I have personally reviewed and/or updated the patient's past medical history, past surgical history, family history, social history, current medications, allergies, and vital signs today  Review of Systems   Constitutional: Negative for fever and unexpected weight change  HENT: Positive for hearing loss  Negative for trouble swallowing  Eyes: Negative for visual disturbance  Respiratory: Negative for shortness of breath and wheezing  Cardiovascular: Negative for chest pain and palpitations  Gastrointestinal: Negative for constipation, diarrhea, nausea and vomiting  Endocrine: Negative for cold intolerance, heat intolerance and polydipsia  Genitourinary: Negative for difficulty urinating and frequency  Musculoskeletal: Negative for arthralgias, gait problem, joint swelling and myalgias  Skin: Negative for rash  Neurological: Negative for dizziness, seizures, syncope, weakness and headaches  Hematological: Does not bruise/bleed easily  Psychiatric/Behavioral: Negative for dysphoric mood  All other systems reviewed and are negative        Patient Active Problem List   Diagnosis   • Essential tremor   • Colon polyp   • Hypothyroidism   • Osteoporosis   • Mixed hyperlipidemia   • Cervical dystonia   • Elevated glucose   • Glaucoma (increased eye pressure)   • COVID-19   • Acid reflux   • Hyponatremia   • 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   • Lung nodule   • Hx of adenomatous colonic polyps   • Dyspnea   • Stage 3a chronic kidney disease (HCC)   • Unilateral emphysema (HCC)   • Numbness   • Spondylolisthesis of lumbar region   • Lumbar degenerative disc disease   • Lumbar disc herniation with radiculopathy       Past Medical History:   Diagnosis Date   • Colon polyp    • COVID-19 02/2021    cough, lost sense of taste for a few days   • COVID-19 vaccine series completed    • Diabetes mellitus ([facility] [address] )    • Disease of thyroid gland     hypo   • Ear problems approx   2019    impacted wax   • Essential tremor     hands and head   • GERD (gastroesophageal reflux disease)    • Glaucoma     both eyes   • HL (hearing loss) around 2012   • Hyperlipidemia    • Osteoporosis    • Persistent dry cough     d/t acid reflux   • Pre-diabetes    • 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 , 2021   • Wears glasses    • Wears hearing aid     darien  ears       Past Surgical History:   Procedure Laterality Date   • CATARACT EXTRACTION Bilateral    • COLONOSCOPY     • EGD     • UPPER GASTROINTESTINAL ENDOSCOPY         Family History   Problem Relation Age of Onset   • Kidney disease Mother    • Throat cancer Father    • Diabetes Sister    • Rheum arthritis Sister    • Diabetes Brother         pre-diabetes   • Rheum arthritis Brother    • Diabetes Brother         DM   • Arthritis Brother    • Diabetes Brother    • Cancer Brother    • Cancer Maternal Grandmother         breast   • Diabetes Paternal Grandmother    • Dystonia Neg Hx    • Tremor Neg Hx        Social History     Occupational History   • Not on file   Tobacco Use   • Smoking status: Former     Packs/day: 1 00     Years: 30 00     Pack years: 30 00 "Types: Cigarettes     Start date: 1961     Quit date: 1991     Years since quittin 7     Passive exposure: Past   • Smokeless tobacco: Never   Vaping Use   • Vaping Use: Never used   Substance and Sexual Activity   • Alcohol use: Yes     Alcohol/week: 10 0 standard drinks     Types: 10 Cans of beer per week     Comment: I usually drink a can of beer  or two after dinner   • Drug use: Never   • Sexual activity: Not Currently       Current Outpatient Medications on File Prior to Visit   Medication Sig   • albendazole (ALBENZA) 200 mg tablet Take 200 mg by mouth   • atorvastatin (LIPITOR) 10 mg tablet take 1 tablet by mouth every morning   • brinzolamide (AZOPT) 1 % ophthalmic suspension Administer to the right eye 2 (two) times a day    • famotidine (PEPCID) 40 MG tablet take 1 tablet by mouth once daily   • fluocinolone acetonide (DERMOTIC) 0 01 % otic oil Administer 3 drops into both ears 2 (two) times a day as needed (for itchy ears)   • levothyroxine 50 mcg tablet take 1 tablet by mouth every morning   • lidocaine (Lidoderm) 5 % Apply 1 patch topically over 12 hours daily Remove & Discard patch within 12 hours or as directed by MD   • travoprost (TRAVATAN-Z) 0 004 % ophthalmic solution Apply 1 drop to eye daily at bedtime Both eyes   • clobetasol (TEMOVATE) 0 05 % ointment Apply topically 2 (two) times a day (Patient not taking: Reported on 5/10/2023)   • predniSONE 10 mg tablet 4 pills daily x 3 days, 3 pills days x 3 days, 2 pills daily x 3 days, 1 pill daily x 3 days Take with food (Patient not taking: Reported on 5/15/2023)     No current facility-administered medications on file prior to visit  No Known Allergies    Physical Exam    /84   Pulse 74   Ht 5' 4\" (1 626 m)   Wt 46 7 kg (103 lb)   BMI 17 68 kg/m²     Constitutional: normal, well developed, well nourished, alert, in no distress and non-toxic and no overt pain behavior    Eyes: anicteric  HEENT: grossly intact  Neck: " supple, symmetric, trachea midline and no masses   Pulmonary:even and unlabored  Cardiovascular:No edema or pitting edema present  Skin:Normal without rashes or lesions and well hydrated  Psychiatric:Mood and affect appropriate  Neurologic:Cranial Nerves II-XII grossly intact  Musculoskeletal:antalgic     Lumbar Spine Exam    Appearance:  Normal lordosis  Palpation/Tenderness:  no tenderness or spasm  Sensory:  no sensory deficits noted  Range of Motion:  Flexion: Moderately limited  with pain  Extension:  Moderately limited  with pain  Lateral Flexion - Left:  Moderately limited  with pain  Lateral Flexion - Right:  Moderately limited  with pain  Rotation - Left:  Moderately limited  with pain  Rotation - Right:  Moderately limited  with pain   Lumbar facet loading is negative bilaterally  Motor Strength:  Left hip flexion:  5/5  Left hip extension:  5/5  Right hip flexion:  5/5  Right hip extension:  5/5  Left knee flexion:  5/5  Left knee extension:  5/5  Right knee flexion:  5/5  Right knee extension:  5/5  Left foot dorsiflexion:  5/5  Left foot plantar flexion:  5/5  Right foot dorsiflexion:  5/5  Right foot plantar flexion:  5/5  Reflexes:  Left Patellar:  2+   Right Patellar:  2+   Left Achilles:  2+   Right Achilles:  2+   Special Tests:  Left Straight Leg Test:  negative  Right Straight Leg Test:  negative  Left Vini's Maneuver:  negative  Right Vini's Maneuver:  negative    Imaging      PACS Images     Show images for CT lumbar spine without contrast  Study Result    Narrative & Impression   CT LUMBAR SPINE     INDICATION:   back pain      COMPARISON: 9/2/2020     TECHNIQUE:  Contiguous axial images through the lumbar spine were obtained  Sagittal and coronal reconstructions were performed      IV Contrast:     Radiation dose length product (DLP) for this visit:  571 44 mGy-cm     This examination, like all CT scans performed in the [facility], was performed utilizing techniques to minimize radiation dose exposure, including the use of iterative   reconstruction and automated exposure control      IMAGE QUALITY:  Diagnostic      FINDINGS:     ALIGNMENT:  There are 5 lumbar type vertebral bodies  Grade 1 anterolisthesis of L5 on S1      VERTEBRAE:  No fracture  No lytic or blastic lesion      DEGENERATIVE CHANGES:     Lower Thoracic spine:  Normal lower thoracic disc spaces      L1-2:  Normal disc height  No herniation  Normal facet joints  No canal or foraminal stenosis      L2-3:  Normal disc height  No herniation  Normal facet joints  No canal or foraminal stenosis      L3-4: Broad-based disc bulge causing anterior impression on the thecal sac  Right foraminal/extraforaminal disc protrusion causing moderate to severe right neuroforaminal narrowing impinging on the exiting right L3 nerve root      L4-5: Broad-based disc bulge causing anterior impression on the thecal sac      L5-S1: Bilateral L5 pars defect with grade 1 anterolisthesis of L5 on S1      PARASPINAL SOFT TISSUES: Hyperdense left renal exophytic cyst was also seen on prior CT 9/2/2020      IMPRESSION:     Degenerative changes as described above  Right foraminal/extraforaminal L3-L4 disc protrusion causing moderate to severe right neuroforaminal narrowing impinging on the exiting right L3 nerve root    Bilateral L5 pars defect with grade 1 anterolisthesis of L5 on S1      Workstation performed: [id number]

## 2023-05-15 NOTE — PROGRESS NOTES
Assessment  1  Acute right-sided low back pain with right-sided sciatica    2  Lumbar disc herniation with radiculopathy    3  Lumbar degenerative disc disease    4  Spondylolisthesis of lumbar region        Plan  The patient is complaining of numbness and pain in her low back and right lower extremity and what appears to be the right L4 and L5 distribution  She does have a herniated lumbar disc at the L3-L4 level, disc bulging at the L4-L5 level, and lumbar spondylolisthesis at the L5-S1 level  All of the CT of the lumbar spine findings were discussed in detail with the patient  At this point, I think she would benefit from a right L4 and L5 transforaminal epidural steroid injection  The procedures, its risks, and benefits were explained in detail to the patient  Risks include but are not limited to bleeding, infection, hematoma formation, abscess formation, weakness, headache, failure the pain to improve, nerve irritation or damage, and potential worsening of the pain  The patient verbalized understanding and wished to proceed with the procedure  I did discuss with the patient that if her pain improves, but her numbness does not improve after the injection, I would consider trialing neuromodulating medications for her  She is hesitant with using any medications for her pain at this point so I did feel it reasonable to trial interventional pain procedures first before any neuromodulating medications  My impressions and treatment recommendations were discussed in detail with the patient who verbalized understanding and had no further questions  Discharge instructions were provided  I personally saw and examined the patient and I agree with the above discussed plan of care  Follow-up is planned in 4 weeks time or sooner as warranted  History of Present Illness    Rod Wells is a [de-identified] y o  female presents to [facility] [address] [facility] for evaluation of the above-stated pain complaints  She is currently complaining of low back pain and right lower extremity radicular symptoms and what appears to be the right L4 and L5 distribution  She has a past medical and chronic pain history as outlined in the impression section  She was referred by KINDRA Ram  At today's office visit, the patient's pain score is 2 out of 10 on the verbal numerical pain rating scale  The patient describes her pain as mild and intermittent in nature  She reports no typical pattern  She describes her pain as numbness  She does not ambulate with any assistive devices  There are no pain relieving factors currently  There are no worsening factors currently  Physical therapy provides no relief and actually made her pain significantly worse while she was doing it  She is not currently using any medications for her symptoms  I have personally reviewed and/or updated the patient's past medical history, past surgical history, family history, social history, current medications, allergies, and vital signs today  Review of Systems   Constitutional: Negative for fever and unexpected weight change  HENT: Positive for hearing loss  Negative for trouble swallowing  Eyes: Negative for visual disturbance  Respiratory: Negative for shortness of breath and wheezing  Cardiovascular: Negative for chest pain and palpitations  Gastrointestinal: Negative for constipation, diarrhea, nausea and vomiting  Endocrine: Negative for cold intolerance, heat intolerance and polydipsia  Genitourinary: Negative for difficulty urinating and frequency  Musculoskeletal: Negative for arthralgias, gait problem, joint swelling and myalgias  Skin: Negative for rash  Neurological: Negative for dizziness, seizures, syncope, weakness and headaches  Hematological: Does not bruise/bleed easily  Psychiatric/Behavioral: Negative for dysphoric mood  All other systems reviewed and are negative        Patient Active Problem List   Diagnosis   • Essential tremor   • Colon polyp   • Hypothyroidism   • Osteoporosis   • Mixed hyperlipidemia   • Cervical dystonia   • Elevated glucose   • Glaucoma (increased eye pressure)   • COVID-19   • Acid reflux   • Hyponatremia   • 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   • Lung nodule   • Hx of adenomatous colonic polyps   • Dyspnea   • Stage 3a chronic kidney disease (HCC)   • Unilateral emphysema (HCC)   • Numbness   • Spondylolisthesis of lumbar region   • Lumbar degenerative disc disease   • Lumbar disc herniation with radiculopathy       Past Medical History:   Diagnosis Date   • Colon polyp    • COVID-19 02/2021    cough, lost sense of taste for a few days   • COVID-19 vaccine series completed    • Diabetes mellitus ([facility] [address] )    • Disease of thyroid gland     hypo   • Ear problems approx   2019    impacted wax   • Essential tremor     hands and head   • GERD (gastroesophageal reflux disease)    • Glaucoma     both eyes   • HL (hearing loss) around 2012   • Hyperlipidemia    • Osteoporosis    • Persistent dry cough     d/t acid reflux   • Pre-diabetes    • 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 , 2021   • Wears glasses    • Wears hearing aid     darien  ears       Past Surgical History:   Procedure Laterality Date   • CATARACT EXTRACTION Bilateral    • COLONOSCOPY     • EGD     • UPPER GASTROINTESTINAL ENDOSCOPY         Family History   Problem Relation Age of Onset   • Kidney disease Mother    • Throat cancer Father    • Diabetes Sister    • Rheum arthritis Sister    • Diabetes Brother         pre-diabetes   • Rheum arthritis Brother    • Diabetes Brother         DM   • Arthritis Brother    • Diabetes Brother    • Cancer Brother    • Cancer Maternal Grandmother         breast   • Diabetes Paternal Grandmother    • Dystonia Neg Hx    • Tremor Neg Hx        Social History     Occupational History   • Not on file   Tobacco Use   • Smoking status: Former     Packs/day: 1 00     Years: 30 00     Pack years: 30 00 "Types: Cigarettes     Start date: 1961     Quit date: 1991     Years since quittin 7     Passive exposure: Past   • Smokeless tobacco: Never   Vaping Use   • Vaping Use: Never used   Substance and Sexual Activity   • Alcohol use: Yes     Alcohol/week: 10 0 standard drinks     Types: 10 Cans of beer per week     Comment: I usually drink a can of beer  or two after dinner   • Drug use: Never   • Sexual activity: Not Currently       Current Outpatient Medications on File Prior to Visit   Medication Sig   • albendazole (ALBENZA) 200 mg tablet Take 200 mg by mouth   • atorvastatin (LIPITOR) 10 mg tablet take 1 tablet by mouth every morning   • brinzolamide (AZOPT) 1 % ophthalmic suspension Administer to the right eye 2 (two) times a day    • famotidine (PEPCID) 40 MG tablet take 1 tablet by mouth once daily   • fluocinolone acetonide (DERMOTIC) 0 01 % otic oil Administer 3 drops into both ears 2 (two) times a day as needed (for itchy ears)   • levothyroxine 50 mcg tablet take 1 tablet by mouth every morning   • lidocaine (Lidoderm) 5 % Apply 1 patch topically over 12 hours daily Remove & Discard patch within 12 hours or as directed by MD   • travoprost (TRAVATAN-Z) 0 004 % ophthalmic solution Apply 1 drop to eye daily at bedtime Both eyes   • clobetasol (TEMOVATE) 0 05 % ointment Apply topically 2 (two) times a day (Patient not taking: Reported on 5/10/2023)   • predniSONE 10 mg tablet 4 pills daily x 3 days, 3 pills days x 3 days, 2 pills daily x 3 days, 1 pill daily x 3 days Take with food (Patient not taking: Reported on 5/15/2023)     No current facility-administered medications on file prior to visit  No Known Allergies    Physical Exam    /84   Pulse 74   Ht 5' 4\" (1 626 m)   Wt 46 7 kg (103 lb)   BMI 17 68 kg/m²     Constitutional: normal, well developed, well nourished, alert, in no distress and non-toxic and no overt pain behavior    Eyes: anicteric  HEENT: grossly intact  Neck: " supple, symmetric, trachea midline and no masses   Pulmonary:even and unlabored  Cardiovascular:No edema or pitting edema present  Skin:Normal without rashes or lesions and well hydrated  Psychiatric:Mood and affect appropriate  Neurologic:Cranial Nerves II-XII grossly intact  Musculoskeletal:antalgic     Lumbar Spine Exam    Appearance:  Normal lordosis  Palpation/Tenderness:  no tenderness or spasm  Sensory:  no sensory deficits noted  Range of Motion:  Flexion: Moderately limited  with pain  Extension:  Moderately limited  with pain  Lateral Flexion - Left:  Moderately limited  with pain  Lateral Flexion - Right:  Moderately limited  with pain  Rotation - Left:  Moderately limited  with pain  Rotation - Right:  Moderately limited  with pain   Lumbar facet loading is negative bilaterally  Motor Strength:  Left hip flexion:  5/5  Left hip extension:  5/5  Right hip flexion:  5/5  Right hip extension:  5/5  Left knee flexion:  5/5  Left knee extension:  5/5  Right knee flexion:  5/5  Right knee extension:  5/5  Left foot dorsiflexion:  5/5  Left foot plantar flexion:  5/5  Right foot dorsiflexion:  5/5  Right foot plantar flexion:  5/5  Reflexes:  Left Patellar:  2+   Right Patellar:  2+   Left Achilles:  2+   Right Achilles:  2+   Special Tests:  Left Straight Leg Test:  negative  Right Straight Leg Test:  negative  Left Vini's Maneuver:  negative  Right Vini's Maneuver:  negative    Imaging      PACS Images     Show images for CT lumbar spine without contrast  Study Result    Narrative & Impression   CT LUMBAR SPINE     INDICATION:   back pain      COMPARISON: 9/2/2020     TECHNIQUE:  Contiguous axial images through the lumbar spine were obtained  Sagittal and coronal reconstructions were performed      IV Contrast:     Radiation dose length product (DLP) for this visit:  571 44 mGy-cm     This examination, like all CT scans performed in the [facility], was performed utilizing techniques to minimize radiation dose exposure, including the use of iterative   reconstruction and automated exposure control      IMAGE QUALITY:  Diagnostic      FINDINGS:     ALIGNMENT:  There are 5 lumbar type vertebral bodies  Grade 1 anterolisthesis of L5 on S1      VERTEBRAE:  No fracture  No lytic or blastic lesion      DEGENERATIVE CHANGES:     Lower Thoracic spine:  Normal lower thoracic disc spaces      L1-2:  Normal disc height  No herniation  Normal facet joints  No canal or foraminal stenosis      L2-3:  Normal disc height  No herniation  Normal facet joints  No canal or foraminal stenosis      L3-4: Broad-based disc bulge causing anterior impression on the thecal sac  Right foraminal/extraforaminal disc protrusion causing moderate to severe right neuroforaminal narrowing impinging on the exiting right L3 nerve root      L4-5: Broad-based disc bulge causing anterior impression on the thecal sac      L5-S1: Bilateral L5 pars defect with grade 1 anterolisthesis of L5 on S1      PARASPINAL SOFT TISSUES: Hyperdense left renal exophytic cyst was also seen on prior CT 9/2/2020      IMPRESSION:     Degenerative changes as described above  Right foraminal/extraforaminal L3-L4 disc protrusion causing moderate to severe right neuroforaminal narrowing impinging on the exiting right L3 nerve root    Bilateral L5 pars defect with grade 1 anterolisthesis of L5 on S1      Workstation performed: [id number]

## 2023-05-16 NOTE — PROGRESS NOTES
[facility] AUDIOLOGY HEARING AID CHECK    Machelle Winters was seen today (5/15/2023) for a hearing aid check of her bilateral hearing aids  Today, Ms Harris reported that she was doing very well with both devices she would like to proceed with custom GAURANG molds due to issues with retention  Device Information    Hearing Aid Fitting Date: 5/15/2023       Left Device Right Device   Hearing Aid Make: Esvin Aguilarer   Hearing Aid Model: [employer] L70-R   Serial Number: [id number]   Repair Warranty Expiration Date: 07/25/26 07/25/26   Loss/Damage Warranty Expiration Date:  07/25/26 07/25/26    Length: 2 2    Output: P P   Wax System: PercSysushield CerushAxenic Dental   Dome Size/Style: Small power Small power   Battery: Lithium-ion Rechargeable Lithium-ion Rechargeable   Earmold Serial Number: N/A N/A   Glory Buddhism Date:  N/A N/A      Serial Number: [id number]  Accessories: N/A    Changes to Device(s)    Earmold impressions taken without incident and good parting otoscopy  Soft silicone molds will be ordered  Canal lock recommended; Ms John Diaz wished to trial a canal mold first prior to adding on retention options  Recommendations & Follow-up    Hearing aids(s) are in good working condition  Ms John Diaz stated that she has no further questions with her hearing aid(s) and does not feed that any other adjustments are needed at this time  She will be contacted for pickup once earmolds are in  REM should be performed at that time  Confirmation #[id number]  It was a pleasure working with Ms Harris today  She was encouraged to contact the clinic with any further questions in the meantime  Liu Palumbo    Clinical Audiologist    [id number] [place] [phone]

## 2023-05-23 DIAGNOSIS — E03.9 HYPOTHYROIDISM, UNSPECIFIED TYPE: ICD-10-CM

## 2023-05-23 RX ORDER — LEVOTHYROXINE SODIUM 0.05 MG/1
TABLET ORAL
Qty: 90 TABLET | Refills: 0 | Status: SHIPPED | OUTPATIENT
Start: 2023-05-23

## 2023-05-24 DIAGNOSIS — K22.70 BARRETT'S ESOPHAGUS WITHOUT DYSPLASIA: ICD-10-CM

## 2023-05-24 DIAGNOSIS — K21.00 GASTROESOPHAGEAL REFLUX DISEASE WITH ESOPHAGITIS WITHOUT HEMORRHAGE: ICD-10-CM

## 2023-05-24 DIAGNOSIS — R05.3 CHRONIC COUGH: ICD-10-CM

## 2023-05-24 RX ORDER — FAMOTIDINE 40 MG/1
TABLET, FILM COATED ORAL
Qty: 60 TABLET | Refills: 0 | Status: SHIPPED | OUTPATIENT
Start: 2023-05-24

## 2023-06-02 ENCOUNTER — HOSPITAL ENCOUNTER (OUTPATIENT)
Facility: AMBULARY SURGERY CENTER | Age: 78
Setting detail: OUTPATIENT SURGERY
Discharge: HOME | End: 2023-06-02
Attending: ANESTHESIOLOGY | Admitting: ANESTHESIOLOGY

## 2023-06-02 ENCOUNTER — APPOINTMENT (OUTPATIENT)
Dept: RADIOLOGY | Facility: HOSPITAL | Age: 78
End: 2023-06-02

## 2023-06-02 VITALS
SYSTOLIC BLOOD PRESSURE: 187 MMHG | HEART RATE: 77 BPM | OXYGEN SATURATION: 97 % | RESPIRATION RATE: 18 BRPM | TEMPERATURE: 97.4 F | DIASTOLIC BLOOD PRESSURE: 81 MMHG

## 2023-06-02 RX ORDER — LIDOCAINE WITH 8.4% SOD BICARB 0.9%(10ML)
SYRINGE (ML) INJECTION AS NEEDED
Status: DISCONTINUED | OUTPATIENT
Start: 2023-06-02 | End: 2023-06-02 | Stop reason: HOSPADM

## 2023-06-02 RX ORDER — BUPIVACAINE HYDROCHLORIDE 2.5 MG/ML
INJECTION, SOLUTION EPIDURAL; INFILTRATION; INTRACAUDAL AS NEEDED
Status: DISCONTINUED | OUTPATIENT
Start: 2023-06-02 | End: 2023-06-02 | Stop reason: HOSPADM

## 2023-06-02 RX ORDER — METHYLPREDNISOLONE ACETATE 40 MG/ML
INJECTION, SUSPENSION INTRA-ARTICULAR; INTRALESIONAL; INTRAMUSCULAR; SOFT TISSUE AS NEEDED
Status: DISCONTINUED | OUTPATIENT
Start: 2023-06-02 | End: 2023-06-02 | Stop reason: HOSPADM

## 2023-06-02 NOTE — OP NOTE
ATTENDING PHYSICIAN:  Jose Antonio Valencia MD     PROCEDURE:  1  Right L4 transforaminal epidural steroid injection under fluoroscopic guidance  2  Right L5 transforaminal epidural steroid injection under fluoroscopic guidance  PRE-PROCEDURE DIAGNOSIS: Low back pain and right lower extremity radiculopathy  POST-PROCEDURE DIAGNOSIS: Low back pain and right lower extremity radiculopathy  ANESTHESIA:  Local     ESTIMATED BLOOD LOSS:  Minimal     COMPLICATIONS:  None  LOCATION:  [address]  CONSENT:  Today's procedure, its potential benefits as well as its risks and potential side effects were reviewed  Discussed risks of the procedure including bleeding, infection, nerve irritation or damage, reactions to the medications, weakness, headache, failure of the pain to improve, and potential worsening of the pain were explained to the patient who verbalized understanding and who wished to proceed  Written informed consent was thereby obtained  DESCRIPTION OF THE PROCEDURE:  After written informed consent was obtained, the patient was taken to the fluoroscopy suite and placed in the prone position  Anatomical landmarks were identified by way of fluoroscopy in multiple views  The skin of the lumbar region was prepped using antiseptic and draped in the usual sterile fashion  Strict aseptic technique was utilized  The skin and subcutaneous tissues at the needle entry site were infiltrated with a total of 5 mL of 1% preservative-free lidocaine using a 25-gauge 1-1/2-inch needle  22-gauge needles were then incrementally advanced under fluoroscopic guidance in the oblique view into the neural foramina as mentioned above  Proper placement into each of the neural foramen was confirmed with fluoroscopy in both the lateral and AP views   After negative aspiration for CSF or heme, contrast was injected, which delineated the nerve roots and the epidural space under fluoroscopy in the AP view  There was only a transient pressure paresthesia that resolved immediately upon injection  After negative aspiration, a 2 mL of a 4 mL injectate consisting of 3 mL of preservative-free 0 25% bupivacaine and 1 mL of Depo-Medrol 80 mg/mL was slowly injected into each of the needles as delineated above  The patient tolerated the procedure well and all needles were removed intact  Hemostasis was maintained  There were no apparent paresthesias or complications  The skin was wiped clean and a Band-Aid was placed as appropriate  The patient was monitored for an appropriate period of time and remained hemodynamically stable following the procedure  The patient was ultimately discharged to home with supervision in good condition and instructed to call the office in approximately 7-10 days with an update or sooner as warranted  Discharge instructions were provided  I was present for and participated in all key and critical portions of this procedure      Jose Antonio Valencia MD  6/2/2023  10:30 AM

## 2023-06-02 NOTE — INTERVAL H&P NOTE
H&P reviewed  After examining the patient I find no changes in the patients condition since the H&P had been written      Vitals:    06/02/23 0933   BP: 160/70   Pulse: 88   Resp: 18   Temp: (!) 97 4 °F (36 3 °C)   SpO2: 100%

## 2023-06-02 NOTE — DISCHARGE INSTRUCTIONS
Epidural Steroid Injection   WHAT YOU NEED TO KNOW:   An epidural steroid injection (SENTHIL) is a procedure to inject steroid medicine into the epidural space  The epidural space is between your spinal cord and vertebrae  Steroids reduce inflammation and fluid buildup in your spine that may be causing pain  You may be given pain medicine along with the steroids  ACTIVITY  Do not drive or operate machinery today  No strenuous activity today - bending, lifting, etc   You may resume normal activites starting tomorrow - start slowly and as tolerated  You may shower today, but no tub baths or hot tubs  You may have numbness for several hours from the local anesthetic  Please use caution and common sense, especially with weight-bearing activities  CARE OF THE INJECTION SITE  If you have soreness or pain, apply ice to the area today (20 minutes on/20 minutes off)  Starting tomorrow, you may use warm, moist heat or ice if needed  You may have an increase or change in your discomfort for 36-48 hours after your treatment  Apply ice and continue with any pain medication you have been prescribed  Notify the [facility] if you have any of the following: redness, drainage, swelling, headache, stiff neck or fever above 100°F     SPECIAL INSTRUCTIONS  Our office will contact you in approximately 7 days for a progress report  MEDICATIONS  Continue to take all routine medications  Our office may have instructed you to hold some medications  As no general anesthesia was used in today's procedure, you should not experience any side effects related to anesthesia  If you are diabetic, the steroids used in today's injection may temporarily increase your blood sugar levels after the first few days after your injection  Please keep a close eye on your sugars and alert the doctor who manages your diabetes if your sugars are significantly high from your baseline or you are symptomatic       If you have a problem specifically related to your procedure, please call our office at [phone]  Problems not related to your procedure should be directed to your primary care physician

## 2023-06-07 ENCOUNTER — OFFICE VISIT (OUTPATIENT)
Dept: AUDIOLOGY | Facility: CLINIC | Age: 78
End: 2023-06-07

## 2023-06-07 DIAGNOSIS — H90.3 SENSORINEURAL HEARING LOSS (SNHL), BILATERAL: Primary | ICD-10-CM

## 2023-06-07 NOTE — PROGRESS NOTES
[facility] AUDIOLOGY HEARING AID CHECK    Nicolasa Fierro was seen today (6/7/2023) for a hearing aid check of her bilateral hearing aids  Today, Ms Harris reported that she was doing well overall with the devices  She reported to be fit with new slim tip molds  Device Information    Hearing Aid Fitting Date: 5/15/2023       Left Device Right Device   Hearing Aid Make: Candeias   Hearing Aid Model: [employer] [place] L70-R   Serial Number: [id number]   Repair Warranty Expiration Date: 07/25/26 07/25/26   Loss/Damage Warranty Expiration Date:  07/25/26 07/25/26    Length: 2 2    Output: P P   Wax System: Braxton Heading   Dome Size/Style: Small power Small power   Battery: [address] [facility] Serial Number: [id number]   Johnyle Six Date:  08/21/23 08/21/23      Serial Number: [id number]  Accessories: N/A    Changes to Device(s)    New silicone slimtips installed  Insertion practiced at length  Ms Panfilo Reed had some mild difficulty with her tremors, but was noticeably not pushing on the mold correctly  This was addressed and remedied, and can likely be improved with practice  Recommendations & Follow-up    Hearing aids(s) are in good working condition  She will RTC in 1 mo  REM should be performed at that time  It was a pleasure working with Ms Harris today  She was encouraged to contact the clinic with any further questions in the meantime  Liu Peñaloza    Clinical Audiologist    [address] [place] [phone]

## 2023-06-09 ENCOUNTER — TELEPHONE (OUTPATIENT)
Dept: PAIN MEDICINE | Facility: CLINIC | Age: 78
End: 2023-06-09

## 2023-06-15 ENCOUNTER — OFFICE VISIT (OUTPATIENT)
Dept: AUDIOLOGY | Facility: CLINIC | Age: 78
End: 2023-06-15

## 2023-06-15 DIAGNOSIS — H90.3 SENSORINEURAL HEARING LOSS (SNHL), BILATERAL: Primary | ICD-10-CM

## 2023-06-26 ENCOUNTER — OFFICE VISIT (OUTPATIENT)
Dept: AUDIOLOGY | Facility: CLINIC | Age: 78
End: 2023-06-26

## 2023-06-26 DIAGNOSIS — H90.3 SENSORINEURAL HEARING LOSS (SNHL), BILATERAL: Primary | ICD-10-CM

## 2023-06-26 NOTE — PROGRESS NOTES
[facility] AUDIOLOGY HEARING AID CHECK    Ladan Patrick was seen today (6/26/2023) for a hearing aid check of her bilateral hearing aids  Today, Ms Harris reported that she is hearing occasional feedback in both ears  Otoscopy revealed Non-occluding cerumen  Device Information    Hearing Aid Fitting Date: 5/15/2023       Left Device Right Device   Hearing Aid Make: [employer] Console   Hearing Aid Model: [place] L70-R   Serial Number: [id number]   Repair Warranty Expiration Date: 07/25/26 07/25/26   Loss/Damage Warranty Expiration Date:  07/25/26 07/25/26    Length: 2 2    Output: P P   Wax System: SSEV   Dome Size/Style: Small power Small power   Battery: [email or web address] [facility] Serial Number: [id number]   Abner Loyola Date:  08/21/23 08/21/23      Serial Number: [id number]  Accessories: N/A    Visual inspection of the device(s) revealed no noticeable damage or defects  A listening check revealed good sound quality from the device(s)  Changes to Device(s)    • Hearing aids were cleaned and wax guards were changed   • Feedback manager was performed and revealed that there is potential for feedback in the high frequencies  • Gain in the high frequencies was reduced from 3-8 kHz by 1 step to aid in the prevention of feedback    Recommendations & Follow-up    Feedback manager results and hearing aid output was explained to Ms Harris at length  Ms Drew Brandon was informed that there is going to be potential for feedback due to her power domes inability to keep sound in the ear  A custom mold would be the best solution for this issue, however, Ms Drew Brandon did not want them due to previous experience with custom molds  The capability of remaking her previous molds was offered, Ms Drew Brandon declined this option   Ms Drew Brandon preferred the confortability of the power domes and understood that she is sacrificing her prescriptive gain  Hearing aid output was validated by Ms Harris for subjective listening comfort  She is happy with all changes and current dome configuration  Hearing aids(s) are in good working condition  Ms Lotus Ribeiro stated that she has no further questions with her hearing aid(s) and does not feed that any other adjustments are needed at this time  She reported overall satisfaction with sound quality with the reduction of gain  She will follow-up in 6  months, or sooner if other problems/concerns arise  It was a pleasure working with Ms Harris today  She was encouraged to contact the clinic with any further questions in the meantime  Liu Hernandez    Clinical Audiologist    [address] [address] [phone]

## 2023-07-03 ENCOUNTER — OFFICE VISIT (OUTPATIENT)
Dept: PAIN MEDICINE | Facility: CLINIC | Age: 78
End: 2023-07-03
Payer: COMMERCIAL

## 2023-07-03 VITALS
HEART RATE: 80 BPM | DIASTOLIC BLOOD PRESSURE: 84 MMHG | SYSTOLIC BLOOD PRESSURE: 151 MMHG | HEIGHT: 64 IN | WEIGHT: 103 LBS | BODY MASS INDEX: 17.58 KG/M2

## 2023-07-03 DIAGNOSIS — M51.16 LUMBAR DISC HERNIATION WITH RADICULOPATHY: ICD-10-CM

## 2023-07-03 DIAGNOSIS — M43.16 SPONDYLOLISTHESIS OF LUMBAR REGION: ICD-10-CM

## 2023-07-03 DIAGNOSIS — G89.4 CHRONIC PAIN SYNDROME: Primary | ICD-10-CM

## 2023-07-03 DIAGNOSIS — M51.36 LUMBAR DEGENERATIVE DISC DISEASE: ICD-10-CM

## 2023-07-03 PROCEDURE — 1160F RVW MEDS BY RX/DR IN RCRD: CPT

## 2023-07-03 PROCEDURE — 1159F MED LIST DOCD IN RCRD: CPT

## 2023-07-03 PROCEDURE — 99213 OFFICE O/P EST LOW 20 MIN: CPT

## 2023-07-04 NOTE — PROGRESS NOTES
Pain Medicine Follow-Up Note    Assessment:  1. Chronic pain syndrome    2. Lumbar disc herniation with radiculopathy    3. Spondylolisthesis of lumbar region    4. Lumbar degenerative disc disease        Plan:    My impressions and treatment recommendations were discussed in detail with the patient who verbalized understanding and had no further questions. Patient presents the office following up on a L4-L5 transforaminal epidural steroid injection that the patient received on 6/2/2023. Patient reports that her complaint is not "pain "but numbness. The patient states she had improvement with the numbness in the right leg but not in the right foot. I discussed the option of trying another injection however the patient at this time does not wish to have additional injections and does not wish to initiate any medications. Patient understands that she may follow-up as needed if she does experience pain or would like to trial another epidural injection for the numbness. Follow-up is planned in as needed time or sooner as warranted. Discharge instructions were provided. I personally saw and examined the patient and I agree with the above discussed plan of care. History of Present Illness:    Skyler Kim is a 78 y.o. female who presents to [address] [place] [address] and [facility] for interval re-evaluation of the above stated pain complaints. The patient has a past medical and chronic pain history as outlined in the assessment section. She was last seen on 6/2/2023. At today's visit patient states that their pain symptoms are better with a pain score of 0/10 on the verbal numeric pain scale. On 6/2/2023 patient had a L4-L5 transforaminal epidural steroid injection which the patient reports improvement in her leg numbness but no improvement in her foot numbness. Patient reports that she does not have traditional pain but numbness, the patient's numbness is located in the right foot.     Other than as stated above, the patient denies any interval changes in medications, medical condition, mental condition, symptoms, or allergies since the last office visit. Review of Systems:    Review of Systems   Respiratory: Negative for shortness of breath. Cardiovascular: Negative for chest pain. Gastrointestinal: Negative for constipation, diarrhea, nausea and vomiting. Musculoskeletal: Negative for arthralgias, gait problem, joint swelling and myalgias. Skin: Negative for rash. Neurological: Positive for numbness. Negative for dizziness, seizures and weakness. All other systems reviewed and are negative. Past Medical History:   Diagnosis Date   • Chronic kidney disease    • Colon polyp    • COVID-19 02/2021    cough, lost sense of taste for a few days   • COVID-19 vaccine series completed    • Diabetes mellitus ([address])    • Disease of thyroid gland     hypo   • Ear problems approx. 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 ears       Past Surgical History:   Procedure Laterality Date   • CATARACT EXTRACTION Bilateral    • COLONOSCOPY     • EGD     • EPIDURAL BLOCK INJECTION Right 6/2/2023    Procedure: L4 L5  TRANSFORAMINAL epidural steroid injection ([id number]);   Surgeon: Hailey Elkins MD;  Location: [facility] MAIN OR;  Service: Pain Management    • UPPER GASTROINTESTINAL ENDOSCOPY         Family History   Problem Relation Age of Onset   • Kidney disease Mother    • Throat cancer Father    • Diabetes Sister    • Rheum arthritis Sister    • Diabetes Brother         pre-diabetes   • Rheum arthritis Brother    • Diabetes Brother         DM   • COPD Brother    • Arthritis Brother    • Diabetes Brother • Cancer Brother    • Cancer Maternal Grandmother         breast   • Diabetes Paternal Grandmother    • Dystonia Neg Hx    • Tremor Neg Hx        Social History     Occupational History   • Not on file   Tobacco Use   • Smoking status: Former     Packs/day: 1.00     Years: 30.00     Total pack years: 30.00     Types: Cigarettes     Start date: 1961     Quit date: 1991     Years since quittin.8     Passive exposure: Past   • Smokeless tobacco: Never   Vaping Use   • Vaping Use: Never used   Substance and Sexual Activity   • Alcohol use:  Yes     Alcohol/week: 10.0 standard drinks of alcohol     Types: 10 Cans of beer per week     Comment: I usually drink a can of beer  or two after dinner   • Drug use: Never   • Sexual activity: Not Currently         Current Outpatient Medications:   •  atorvastatin (LIPITOR) 10 mg tablet, take 1 tablet by mouth every morning, Disp: 90 tablet, Rfl: 0  •  brinzolamide (AZOPT) 1 % ophthalmic suspension, Administer to the right eye 2 (two) times a day , Disp: , Rfl:   •  famotidine (PEPCID) 40 MG tablet, take 1 tablet by mouth once daily, Disp: 60 tablet, Rfl: 0  •  fluocinolone acetonide (DERMOTIC) 0.01 % otic oil, Administer 3 drops into both ears 2 (two) times a day as needed (for itchy ears), Disp: 20 mL, Rfl: 11  •  levothyroxine 50 mcg tablet, take 1 tablet by mouth every morning, Disp: 90 tablet, Rfl: 0  •  lidocaine (Lidoderm) 5 %, Apply 1 patch topically over 12 hours daily Remove & Discard patch within 12 hours or as directed by MD, Disp: 30 patch, Rfl: 0  •  travoprost (TRAVATAN-Z) 0.004 % ophthalmic solution, Apply 1 drop to eye daily at bedtime Both eyes, Disp: , Rfl:   •  clobetasol (TEMOVATE) 0.05 % ointment, Apply topically 2 (two) times a day (Patient not taking: Reported on 5/10/2023), Disp: 60 g, Rfl: 0    No Known Allergies    Physical Exam:    /84   Pulse 80   Ht 5' 4" (1.626 m)   Wt 46.7 kg (103 lb)   BMI 17.68 kg/m² Constitutional:underweight  Eyes:anicteric  HEENT:grossly intact  Neck:supple, symmetric, trachea midline and no masses   Pulmonary:even and unlabored  Cardiovascular:No edema or pitting edema present  Skin:Normal without rashes or lesions and well hydrated  Psychiatric:Mood and affect appropriate  Neurologic:Cranial Nerves II-XII grossly intact  Musculoskeletal:normal      This document was created using speech voice recognition software. Grammatical errors, random word insertions, pronoun errors, and incomplete sentences are an occasional consequence of this system due to software limitations, ambient noise, and hardware issues. Any formal questions or concerns about content, text, or information contained within the body of this dictation should be directly addressed to the provider for clarification.

## 2023-07-12 DIAGNOSIS — E78.2 MIXED HYPERLIPIDEMIA: ICD-10-CM

## 2023-07-12 RX ORDER — ATORVASTATIN CALCIUM 10 MG/1
TABLET, FILM COATED ORAL
Qty: 90 TABLET | Refills: 0 | Status: SHIPPED | OUTPATIENT
Start: 2023-07-12

## 2023-07-14 ENCOUNTER — OFFICE VISIT (OUTPATIENT)
Dept: GASTROENTEROLOGY | Facility: CLINIC | Age: 78
End: 2023-07-14
Payer: COMMERCIAL

## 2023-07-14 VITALS
OXYGEN SATURATION: 97 % | SYSTOLIC BLOOD PRESSURE: 142 MMHG | BODY MASS INDEX: 17.75 KG/M2 | HEIGHT: 64 IN | WEIGHT: 104 LBS | DIASTOLIC BLOOD PRESSURE: 89 MMHG | HEART RATE: 78 BPM

## 2023-07-14 DIAGNOSIS — K22.70 BARRETT'S ESOPHAGUS WITHOUT DYSPLASIA: ICD-10-CM

## 2023-07-14 DIAGNOSIS — K21.00 GASTROESOPHAGEAL REFLUX DISEASE WITH ESOPHAGITIS WITHOUT HEMORRHAGE: Primary | ICD-10-CM

## 2023-07-14 DIAGNOSIS — Z86.010 HX OF ADENOMATOUS COLONIC POLYPS: ICD-10-CM

## 2023-07-14 DIAGNOSIS — Q43.8 REDUNDANT COLON: ICD-10-CM

## 2023-07-14 DIAGNOSIS — R05.3 CHRONIC COUGH: ICD-10-CM

## 2023-07-14 PROCEDURE — 99214 OFFICE O/P EST MOD 30 MIN: CPT | Performed by: INTERNAL MEDICINE

## 2023-07-14 NOTE — PROGRESS NOTES
Answers for HPI/ROS submitted by the patient on 7/8/2023  Progression since onset: resolved  anorexia: No  arthralgias: No  belching: No  constipation: No  diarrhea: No  dysuria: No  fever: No  flatus: No  frequency: No  headaches: No  hematochezia: No  hematuria: No  melena: No  myalgias: No  nausea: No  weight loss: No  vomiting: No  Relieved by: nothing    [facility] - Outpatient Follow-up Note  Anatoly Sims 78 y.o. female MRN: [id number]  Encounter: [id number]          ASSESSMENT AND PLAN:      1. Gastroesophageal reflux disease with esophagitis without hemorrhage  Controlled on Pepcid 40 mg a day. However still with chronic cough. Will increase to twice a day. I have asked her to see her primary care doctor regarding pulmonary evaluation which she has never had. She has seen ears nose and throat for hearing loss and wax buildup. 2. Manajrrez's esophagus without dysplasia  Next surveillance December 2024    3. Chronic cough  As above    4. Hx of adenomatous colonic polyps  Next colonoscopy 2027    5. Redundant colon  As above. She will otherwise follow-up in 6 months.    ______________________________________________________________________    SUBJECTIVE: Very pleasant and active 78-year-old female we see for Manjarrez's esophagus gastroesophageal reflux adenomatous colon polyps. She likewise has a chronic cough some response to Pepcid. She is intolerant to PPIs. We discussed increasing the Pepcid to twice a day. She has seen ears nose and throat. She has never seen pulmonary. She otherwise has no particular complaints. She is active and working. On occasion gets shortness of breath. REVIEW OF SYSTEMS IS OTHERWISE NEGATIVE.       Historical Information   Past Medical History:   Diagnosis Date   • Chronic kidney disease    • Colon polyp    • COVID-19 02/2021    cough, lost sense of taste for a few days   • COVID-19 vaccine series completed    • Diabetes mellitus [facility])    • Disease of thyroid gland     hypo   • Ear problems approx. 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 ears     Past Surgical History:   Procedure Laterality Date   • CATARACT EXTRACTION Bilateral    • COLONOSCOPY     • EGD     • EPIDURAL BLOCK INJECTION Right 2023    Procedure: L4 L5  TRANSFORAMINAL epidural steroid injection ([id number]);   Surgeon: Simba Valderrama MD;  Location: [facility] MAIN OR;  Service: Pain Management    • UPPER GASTROINTESTINAL ENDOSCOPY       Social History   Social History     Substance and Sexual Activity   Alcohol Use Yes   • Alcohol/week: 10.0 standard drinks of alcohol   • Types: 10 Cans of beer per week    Comment: I usually drink a can of beer  or two after dinner     Social History     Substance and Sexual Activity   Drug Use Never     Social History     Tobacco Use   Smoking Status Former   • Packs/day: 1.00   • Years: 30.00   • Total pack years: 30.00   • Types: Cigarettes   • Start date: 1961   • Quit date: 1991   • Years since quittin.8   • Passive exposure: Past   Smokeless Tobacco Never     Family History   Problem Relation Age of Onset   • Kidney disease Mother    • Throat cancer Father    • Diabetes Sister    • Rheum arthritis Sister    • Diabetes Brother         pre-diabetes   • Rheum arthritis Brother    • Diabetes Brother         DM   • COPD Brother    • Arthritis Brother    • Diabetes Brother    • Cancer Brother    • Cancer Maternal Grandmother         breast   • Diabetes Paternal Grandmother    • Dystonia Neg Hx    • Tremor Neg Hx        Meds/Allergies       Current Outpatient Medications:   •  atorvastatin (LIPITOR) 10 mg tablet  •  brinzolamide (AZOPT) 1 % ophthalmic suspension  •  famotidine (PEPCID) 40 MG tablet  •  fluocinolone acetonide (DERMOTIC) 0.01 % otic oil  •  levothyroxine 50 mcg tablet  •  travoprost (TRAVATAN-Z) 0.004 % ophthalmic solution  •  clobetasol (TEMOVATE) 0.05 % ointment  •  lidocaine (Lidoderm) 5 %    No Known Allergies        Objective     Blood pressure 142/89, pulse 78, height 5' 4" (1.626 m), weight 47.2 kg (104 lb), SpO2 97 %. Body mass index is 17.85 kg/m². PHYSICAL EXAM:      General Appearance:   Alert, cooperative, no distress   HEENT:   Normocephalic, atraumatic, anicteric.     Neck:  Supple, symmetrical, trachea midline   Lungs:   Clear to auscultation bilaterally; no rales, rhonchi or wheezing; respirations unlabored    Heart[de-identified]   Regular rate and rhythm; no murmur, rub, or gallop. Abdomen:   Soft, non-tender, non-distended; normal bowel sounds; no masses, no organomegaly    Genitalia:   Deferred    Rectal:   Deferred    Extremities:  No cyanosis, clubbing or edema    Pulses:  2+ and symmetric    Skin:  No jaundice, rashes, or lesions    Lymph nodes:  No palpable cervical lymphadenopathy        Lab Results:   No visits with results within 1 Day(s) from this visit.    Latest known visit with results is:   Office Visit on 10/03/2022   Component Date Value   • White Blood Cell Count 04/03/2023 8.5    • Red Blood Cell Count 04/03/2023 4.00    • Hemoglobin 04/03/2023 12.3    • HCT 04/03/2023 35.7    • MCV 04/03/2023 89.3    • MCH 04/03/2023 30.8    • MCHC 04/03/2023 34.5    • RDW 04/03/2023 11.5    • Platelet Count 30/98/9095 304    • SL AMB MPV 04/03/2023 9.3    • Neutrophils (Absolute) 04/03/2023 5,398    • Lymphocytes (Absolute) 04/03/2023 1,777    • Monocytes (Absolute) 04/03/2023 969 (H)    • Eosinophils (Absolute) 04/03/2023 281    • Basophils ABS 04/03/2023 77    • Neutrophils 04/03/2023 63.5    • Lymphocytes 04/03/2023 20.9    • Monocytes 04/03/2023 11.4    • Eosinophils 04/03/2023 3.3    • Basophils PCT 04/03/2023 0.9    • Glucose, Random 04/03/2023 105 (H)    • BUN 04/03/2023 12    • Creatinine 04/03/2023 0.99    • eGFR 04/03/2023 58 (L)    • SL AMB BUN/CREATININE RA* 98/91/9256 NOT APPLICABLE    • Sodium 27/54/0465 136    • Potassium 04/03/2023 4.2    • Chloride 04/03/2023 100    • CO2 04/03/2023 28    • Calcium 04/03/2023 9.7    • Protein, Total 04/03/2023 7.4    • Albumin 04/03/2023 4.6    • Globulin 04/03/2023 2.8    • Albumin/Globulin Ratio 04/03/2023 1.6    • TOTAL BILIRUBIN 04/03/2023 0.6    • Alkaline Phosphatase 04/03/2023 82    • AST 04/03/2023 21    • ALT 04/03/2023 18    • Total Cholesterol 04/03/2023 179    • HDL 04/03/2023 82    • Triglycerides 04/03/2023 116    • LDL Calculated 04/03/2023 77    • Chol HDLC Ratio 04/03/2023 2.2    • Non-HDL Cholesterol 04/03/2023 97    • TSH W/RFX TO FREE T4 04/03/2023 2.12    • Hemoglobin A1C 04/03/2023 5.8 (H)          Radiology Results:   No results found.

## 2023-07-14 NOTE — PATIENT INSTRUCTIONS
Patient still with a intermittent cough. Intolerant to PPIs. She is on Pepcid once a day 40 mg we will try twice a day. Next EGD is due in December 2024 next colonoscopy due to history of colon polyps 2027.

## 2023-07-21 DIAGNOSIS — K21.00 GASTROESOPHAGEAL REFLUX DISEASE WITH ESOPHAGITIS WITHOUT HEMORRHAGE: ICD-10-CM

## 2023-07-21 DIAGNOSIS — K22.70 BARRETT'S ESOPHAGUS WITHOUT DYSPLASIA: ICD-10-CM

## 2023-07-21 DIAGNOSIS — R05.3 CHRONIC COUGH: ICD-10-CM

## 2023-07-21 RX ORDER — FAMOTIDINE 40 MG/1
TABLET, FILM COATED ORAL
Qty: 60 TABLET | Refills: 0 | Status: SHIPPED | OUTPATIENT
Start: 2023-07-21

## 2023-08-16 DIAGNOSIS — E03.9 HYPOTHYROIDISM, UNSPECIFIED TYPE: ICD-10-CM

## 2023-08-16 RX ORDER — LEVOTHYROXINE SODIUM 0.05 MG/1
TABLET ORAL
Qty: 90 TABLET | Refills: 0 | Status: SHIPPED | OUTPATIENT
Start: 2023-08-16

## 2023-09-15 DIAGNOSIS — R05.3 CHRONIC COUGH: ICD-10-CM

## 2023-09-15 DIAGNOSIS — K22.70 BARRETT'S ESOPHAGUS WITHOUT DYSPLASIA: ICD-10-CM

## 2023-09-15 DIAGNOSIS — K21.00 GASTROESOPHAGEAL REFLUX DISEASE WITH ESOPHAGITIS WITHOUT HEMORRHAGE: ICD-10-CM

## 2023-09-15 RX ORDER — FAMOTIDINE 40 MG/1
TABLET, FILM COATED ORAL
Qty: 60 TABLET | Refills: 5 | Status: SHIPPED | OUTPATIENT
Start: 2023-09-15

## 2023-10-06 ENCOUNTER — APPOINTMENT (OUTPATIENT)
Dept: LAB | Facility: CLINIC | Age: 78
End: 2023-10-06
Payer: COMMERCIAL

## 2023-10-06 DIAGNOSIS — R73.09 ELEVATED GLUCOSE: ICD-10-CM

## 2023-10-06 DIAGNOSIS — N18.31 STAGE 3A CHRONIC KIDNEY DISEASE (HCC): ICD-10-CM

## 2023-10-06 DIAGNOSIS — E78.2 MIXED HYPERLIPIDEMIA: ICD-10-CM

## 2023-10-06 LAB
ALBUMIN SERPL BCP-MCNC: 4.3 G/DL (ref 3.5–5)
ALP SERPL-CCNC: 71 U/L (ref 34–104)
ALT SERPL W P-5'-P-CCNC: 16 U/L (ref 7–52)
ANION GAP SERPL CALCULATED.3IONS-SCNC: 10 MMOL/L
AST SERPL W P-5'-P-CCNC: 21 U/L (ref 13–39)
BASOPHILS # BLD AUTO: 0.07 THOUSANDS/ÂΜL (ref 0–0.1)
BASOPHILS NFR BLD AUTO: 1 % (ref 0–1)
BILIRUB SERPL-MCNC: 0.64 MG/DL (ref 0.2–1)
BUN SERPL-MCNC: 14 MG/DL (ref 5–25)
CALCIUM SERPL-MCNC: 9.4 MG/DL (ref 8.4–10.2)
CHLORIDE SERPL-SCNC: 98 MMOL/L (ref 96–108)
CHOLEST SERPL-MCNC: 155 MG/DL
CO2 SERPL-SCNC: 25 MMOL/L (ref 21–32)
CREAT SERPL-MCNC: 0.99 MG/DL (ref 0.6–1.3)
EOSINOPHIL # BLD AUTO: 0.28 THOUSAND/ÂΜL (ref 0–0.61)
EOSINOPHIL NFR BLD AUTO: 4 % (ref 0–6)
ERYTHROCYTE [DISTWIDTH] IN BLOOD BY AUTOMATED COUNT: 11.8 % (ref 11.6–15.1)
EST. AVERAGE GLUCOSE BLD GHB EST-MCNC: 131 MG/DL
GFR SERPL CREATININE-BSD FRML MDRD: 53 ML/MIN/1.73SQ M
GLUCOSE P FAST SERPL-MCNC: 101 MG/DL (ref 65–99)
HBA1C MFR BLD: 6.2 %
HCT VFR BLD AUTO: 37.4 % (ref 34.8–46.1)
HDLC SERPL-MCNC: 71 MG/DL
HGB BLD-MCNC: 12.5 G/DL (ref 11.5–15.4)
IMM GRANULOCYTES # BLD AUTO: 0.02 THOUSAND/UL (ref 0–0.2)
IMM GRANULOCYTES NFR BLD AUTO: 0 % (ref 0–2)
LDLC SERPL CALC-MCNC: 60 MG/DL (ref 0–100)
LYMPHOCYTES # BLD AUTO: 1.97 THOUSANDS/ÂΜL (ref 0.6–4.47)
LYMPHOCYTES NFR BLD AUTO: 29 % (ref 14–44)
MCH RBC QN AUTO: 32.3 PG (ref 26.8–34.3)
MCHC RBC AUTO-ENTMCNC: 33.4 G/DL (ref 31.4–37.4)
MCV RBC AUTO: 97 FL (ref 82–98)
MONOCYTES # BLD AUTO: 0.82 THOUSAND/ÂΜL (ref 0.17–1.22)
MONOCYTES NFR BLD AUTO: 12 % (ref 4–12)
NEUTROPHILS # BLD AUTO: 3.69 THOUSANDS/ÂΜL (ref 1.85–7.62)
NEUTS SEG NFR BLD AUTO: 54 % (ref 43–75)
NONHDLC SERPL-MCNC: 84 MG/DL
NRBC BLD AUTO-RTO: 0 /100 WBCS
PLATELET # BLD AUTO: 315 THOUSANDS/UL (ref 149–390)
PMV BLD AUTO: 10 FL (ref 8.9–12.7)
POTASSIUM SERPL-SCNC: 4.3 MMOL/L (ref 3.5–5.3)
PROT SERPL-MCNC: 7.2 G/DL (ref 6.4–8.4)
RBC # BLD AUTO: 3.87 MILLION/UL (ref 3.81–5.12)
SODIUM SERPL-SCNC: 133 MMOL/L (ref 135–147)
TRIGL SERPL-MCNC: 118 MG/DL
WBC # BLD AUTO: 6.85 THOUSAND/UL (ref 4.31–10.16)

## 2023-10-06 PROCEDURE — 83036 HEMOGLOBIN GLYCOSYLATED A1C: CPT

## 2023-10-06 PROCEDURE — 36415 COLL VENOUS BLD VENIPUNCTURE: CPT

## 2023-10-06 PROCEDURE — 80053 COMPREHEN METABOLIC PANEL: CPT

## 2023-10-06 PROCEDURE — 85025 COMPLETE CBC W/AUTO DIFF WBC: CPT

## 2023-10-06 PROCEDURE — 80061 LIPID PANEL: CPT

## 2023-10-09 DIAGNOSIS — E78.2 MIXED HYPERLIPIDEMIA: ICD-10-CM

## 2023-10-09 RX ORDER — ATORVASTATIN CALCIUM 10 MG/1
TABLET, FILM COATED ORAL
Qty: 90 TABLET | Refills: 0 | Status: SHIPPED | OUTPATIENT
Start: 2023-10-09

## 2023-10-11 ENCOUNTER — TELEPHONE (OUTPATIENT)
Age: 78
End: 2023-10-11

## 2023-10-11 ENCOUNTER — OFFICE VISIT (OUTPATIENT)
Dept: FAMILY MEDICINE CLINIC | Facility: CLINIC | Age: 78
End: 2023-10-11
Payer: COMMERCIAL

## 2023-10-11 VITALS
RESPIRATION RATE: 18 BRPM | BODY MASS INDEX: 18.78 KG/M2 | TEMPERATURE: 97.5 F | SYSTOLIC BLOOD PRESSURE: 140 MMHG | HEART RATE: 70 BPM | DIASTOLIC BLOOD PRESSURE: 80 MMHG | HEIGHT: 63 IN | WEIGHT: 106 LBS

## 2023-10-11 DIAGNOSIS — E03.9 HYPOTHYROIDISM, UNSPECIFIED TYPE: ICD-10-CM

## 2023-10-11 DIAGNOSIS — Z00.00 MEDICARE ANNUAL WELLNESS VISIT, SUBSEQUENT: Primary | ICD-10-CM

## 2023-10-11 DIAGNOSIS — R73.09 ELEVATED GLUCOSE: ICD-10-CM

## 2023-10-11 DIAGNOSIS — G25.0 ESSENTIAL TREMOR: ICD-10-CM

## 2023-10-11 DIAGNOSIS — E78.2 MIXED HYPERLIPIDEMIA: ICD-10-CM

## 2023-10-11 DIAGNOSIS — E87.1 HYPONATREMIA: ICD-10-CM

## 2023-10-11 PROBLEM — R06.00 DYSPNEA: Status: RESOLVED | Noted: 2022-06-07 | Resolved: 2023-10-11

## 2023-10-11 PROBLEM — U07.1 COVID-19: Status: RESOLVED | Noted: 2021-02-08 | Resolved: 2023-10-11

## 2023-10-11 PROCEDURE — 99214 OFFICE O/P EST MOD 30 MIN: CPT | Performed by: INTERNAL MEDICINE

## 2023-10-11 PROCEDURE — G0439 PPPS, SUBSEQ VISIT: HCPCS | Performed by: INTERNAL MEDICINE

## 2023-10-11 NOTE — PATIENT INSTRUCTIONS
Medicare Preventive Visit Patient Instructions  Thank you for completing your Welcome to Medicare Visit or Medicare Annual Wellness Visit today. Your next wellness visit will be due in one year (10/11/2024). The screening/preventive services that you may require over the next 5-10 years are detailed below. Some tests may not apply to you based off risk factors and/or age. Screening tests ordered at today's visit but not completed yet may show as past due. Also, please note that scanned in results may not display below. Preventive Screenings:  Service Recommendations Previous Testing/Comments   Colorectal Cancer Screening  * Colonoscopy    * Fecal Occult Blood Test (FOBT)/Fecal Immunochemical Test (FIT)  * Fecal DNA/Cologuard Test  * Flexible Sigmoidoscopy Age: 43-73 years old   Colonoscopy: every 10 years (may be performed more frequently if at higher risk)  OR  FOBT/FIT: every 1 year  OR  Cologuard: every 3 years  OR  Sigmoidoscopy: every 5 years  Screening may be recommended earlier than age 39 if at higher risk for colorectal cancer. Also, an individualized decision between you and your healthcare provider will decide whether screening between the ages of 77-80 would be appropriate. Colonoscopy: 08/26/2020  FOBT/FIT: Not on file  Cologuard: Not on file  Sigmoidoscopy: Not on file          Breast Cancer Screening Age: 36 years old  Frequency: every 1-2 years  Not required if history of left and right mastectomy Mammogram: 11/08/2018        Cervical Cancer Screening Between the ages of 21-29, pap smear recommended once every 3 years. Between the ages of 32-69, can perform pap smear with HPV co-testing every 5 years.    Recommendations may differ for women with a history of total hysterectomy, cervical cancer, or abnormal pap smears in past. Pap Smear: Not on file    Screening Not Indicated   Hepatitis C Screening Once for adults born between [address] [place] [address]  More frequently in patients at high risk for Hepatitis C Hep C Antibody: Not on file        Diabetes Screening 1-2 times per year if you're at risk for diabetes or have pre-diabetes Fasting glucose: 101 mg/dL (10/6/2023)  A1C: 6.2 % (10/6/2023)  Screening Current   Cholesterol Screening Once every 5 years if you don't have a lipid disorder. May order more often based on risk factors. Lipid panel: 10/06/2023    Screening Not Indicated  History Lipid Disorder     Other Preventive Screenings Covered by Medicare:  Abdominal Aortic Aneurysm (AAA) Screening: covered once if your at risk. You're considered to be at risk if you have a family history of AAA. Lung Cancer Screening: covers low dose CT scan once per year if you meet all of the following conditions: (1) Age 48-67; (2) No signs or symptoms of lung cancer; (3) Current smoker or have quit smoking within the last 15 years; (4) You have a tobacco smoking history of at least 20 pack years (packs per day multiplied by number of years you smoked); (5) You get a written order from a healthcare provider. Glaucoma Screening: covered annually if you're considered high risk: (1) You have diabetes OR (2) Family history of glaucoma OR (3)  aged 48 and older OR (3)  American aged 72 and older  Osteoporosis Screening: covered every 2 years if you meet one of the following conditions: (1) You're estrogen deficient and at risk for osteoporosis based off medical history and other findings; (2) Have a vertebral abnormality; (3) On glucocorticoid therapy for more than 3 months; (4) Have primary hyperparathyroidism; (5) On osteoporosis medications and need to assess response to drug therapy. Last bone density test (DXA Scan): Not on file. HIV Screening: covered annually if you're between the age of 14-79. Also covered annually if you are younger than 13 and older than 72 with risk factors for HIV infection. For pregnant patients, it is covered up to 3 times per pregnancy.     Immunizations:  Immunization Recommendations   Influenza Vaccine Annual influenza vaccination during flu season is recommended for all persons aged >= 6 months who do not have contraindications   Pneumococcal Vaccine   * Pneumococcal conjugate vaccine = PCV13 (Prevnar 13), PCV15 (Vaxneuvance), PCV20 (Prevnar 20)  * Pneumococcal polysaccharide vaccine = PPSV23 (Pneumovax) Adults 07-23 yo with certain risk factors or if 69+ yo  If never received any pneumonia vaccine: recommend Prevnar 20 (PCV20)  Give PCV20 if previously received 1 dose of PCV13 or PPSV23   Hepatitis B Vaccine 3 dose series if at intermediate or high risk (ex: diabetes, end stage renal disease, liver disease)   Respiratory syncytial virus (RSV) Vaccine - COVERED BY MEDICARE PART D  * RSVPreF3 (Arexvy) CDC recommends that adults 61years of age and older may receive a single dose of RSV vaccine using shared clinical decision-making (SCDM)   Tetanus (Td) Vaccine - COST NOT COVERED BY MEDICARE PART B Following completion of primary series, a booster dose should be given every 10 years to maintain immunity against tetanus. Td may also be given as tetanus wound prophylaxis. Tdap Vaccine - COST NOT COVERED BY MEDICARE PART B Recommended at least once for all adults. For pregnant patients, recommended with each pregnancy. Shingles Vaccine (Shingrix) - COST NOT COVERED BY MEDICARE PART B  2 shot series recommended in those 19 years and older who have or will have weakened immune systems or those 50 years and older     Health Maintenance Due:  There are no preventive care reminders to display for this patient. Immunizations Due:      Topic Date Due   • Influenza Vaccine (1) 09/01/2023     Advance Directives   What are advance directives? Advance directives are legal documents that state your wishes and plans for medical care. These plans are made ahead of time in case you lose your ability to make decisions for yourself.  Advance directives can apply to any medical decision, such as the treatments you want, and if you want to donate organs. What are the types of advance directives? There are many types of advance directives, and each state has rules about how to use them. You may choose a combination of any of the following:  Living will: This is a written record of the treatment you want. You can also choose which treatments you do not want, which to limit, and which to stop at a certain time. This includes surgery, medicine, IV fluid, and tube feedings. Durable power of  for [facility]): This is a written record that states who you want to make healthcare choices for you when you are unable to make them for yourself. This person, called a proxy, is usually a family member or a friend. You may choose more than 1 proxy. Do not resuscitate (DNR) order:  A DNR order is used in case your heart stops beating or you stop breathing. It is a request not to have certain forms of treatment, such as CPR. A DNR order may be included in other types of advance directives. Medical directive: This covers the care that you want if you are in a coma, near death, or unable to make decisions for yourself. You can list the treatments you want for each condition. Treatment may include pain medicine, surgery, blood transfusions, dialysis, IV or tube feedings, and a ventilator (breathing machine). Values history: This document has questions about your views, beliefs, and how you feel and think about life. This information can help others choose the care that you would choose. Why are advance directives important? An advance directive helps you control your care. Although spoken wishes may be used, it is better to have your wishes written down. Spoken wishes can be misunderstood, or not followed. Treatments may be given even if you do not want them. An advance directive may make it easier for your family to make difficult choices about your care.        © Copyright [employer] 2018 Information is for End User's use only and may not be sold, redistributed or otherwise used for commercial purposes.  All illustrations and images included in CareNotes® are the copyrighted property of [employer].D.A.M., Inc. or [address] [place] [address]

## 2023-10-11 NOTE — TELEPHONE ENCOUNTER
She couldn't hear me on the phone and asked that I send a Mys[employer] message, which I did. No further action needed.

## 2023-10-11 NOTE — PROGRESS NOTES
Assessment & Plan     1. Medicare annual wellness visit, subsequent        Depression Screening and Follow-up Plan: Patient was screened for depression during today's encounter. They screened negative with a PHQ-2 score of 0. Subjective     Transitional Care Management Review:   Chelo Pettit is a 78 y.o. female here for TCM follow up. During the TCM phone call patient stated:  TCM Call     None      TCM Call     None        HPI  Review of Systems    Objective     /80   Pulse 70   Temp 97.5 °F (36.4 °C)   Resp 18   Ht 5' 3" (1.6 m)   Wt 48.1 kg (106 lb)   BMI 18.78 kg/m²      Physical Exam  { Medications have NOT been reviewed by provider in current encounter.  Once medications have been reviewed, please refresh your progress note so that you can sign the visit }    Eduard Guerrero MD

## 2023-10-11 NOTE — PROGRESS NOTES
Assessment and Plan:     Problem List Items Addressed This Visit        Endocrine    Hypothyroidism     Reviewed labs with patient and TSH is normal, continue levothyroxine as ordered. Relevant Orders    TSH, 3rd generation with Free T4 reflex       Nervous and Auditory    Essential tremor     She was referred to a new neurologist; this is worsening. Relevant Orders    Ambulatory Referral to Neurology       Other    Mixed hyperlipidemia     Reviewed labs with patient, well controlled on atorvastatin and will continue. Relevant Orders    CBC and differential    Comprehensive metabolic panel    Lipid panel    Elevated glucose     She was counselled on low carb diet and continued exercise. Relevant Orders    HEMOGLOBIN A1C W/ EAG ESTIMATION    Hyponatremia     Will continue salt addition and recheck. Relevant Orders    CBC and differential    Comprehensive metabolic panel    Lipid panel   Other Visit Diagnoses     Medicare annual wellness visit, subsequent    -  Primary          Depression Screening and Follow-up Plan: Patient was screened for depression during today's encounter. They screened negative with a PHQ-2 score of 0. Preventive health issues were discussed with patient, and age appropriate screening tests were ordered as noted in patient's After Visit Summary. Personalized health advice and appropriate referrals for health education or preventive services given if needed, as noted in patient's After Visit Summary. History of Present Illness:     Patient presents for a Medicare Wellness Visit    Here for follow up and AWV. She continues to have a lot of issues with her tremors. She feels the neurologist she was seeing just "blew her off" so she is looking for someone else. Reviewed labs with patient. She has started taking an otc salt pill. She does not take a lot of water daily. No side effects with medications.   Staying active and dances for exercise regularly. Patient Care Team:  Ady Osullivan MD as PCP - Jose Angel Mccord MD     Review of Systems:     Review of Systems   Constitutional:  Negative for chills and fever. HENT:  Negative for ear pain and sore throat. Eyes:  Negative for pain and visual disturbance. Respiratory:  Negative for cough and shortness of breath. Cardiovascular:  Negative for chest pain and palpitations. Gastrointestinal:  Negative for abdominal pain and vomiting. Genitourinary:  Negative for dysuria and hematuria. Musculoskeletal:  Negative for arthralgias and back pain. Skin:  Negative for color change and rash. Neurological:  Positive for tremors. Negative for seizures and syncope. All other systems reviewed and are negative. Problem List:     Patient Active Problem List   Diagnosis   • Essential tremor   • Colon polyp   • Hypothyroidism   • Osteoporosis   • Mixed hyperlipidemia   • Cervical dystonia   • Elevated glucose   • Glaucoma (increased eye pressure)   • Acid reflux   • Hyponatremia   • 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   • Lung nodule   • Hx of adenomatous colonic polyps   • Stage 3a chronic kidney disease (HCC)   • Unilateral emphysema (HCC)   • Numbness   • Spondylolisthesis of lumbar region   • Lumbar degenerative disc disease   • Lumbar disc herniation with radiculopathy      Past Medical and Surgical History:     Past Medical History:   Diagnosis Date   • Chronic kidney disease    • Colon polyp    • COVID-19 02/2021    cough, lost sense of taste for a few days   • COVID-19 vaccine series completed    • Diabetes mellitus ([address])    • Disease of thyroid gland     hypo   • Ear problems approx.  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 ears     Past Surgical History:   Procedure Laterality Date   • CATARACT EXTRACTION Bilateral    • COLONOSCOPY     • EGD     • EPIDURAL BLOCK INJECTION Right 2023    Procedure: L4 L5  TRANSFORAMINAL epidural steroid injection ([id number]); Surgeon: Manasa Beyer MD;  Location: [facility] MAIN OR;  Service: Pain Management    • UPPER GASTROINTESTINAL ENDOSCOPY        Family History:     Family History   Problem Relation Age of Onset   • Kidney disease Mother    • Throat cancer Father    • Diabetes Sister    • Rheum arthritis Sister    • Diabetes Brother         pre-diabetes   • Rheum arthritis Brother    • Diabetes Brother         DM   • COPD Brother    • Arthritis Brother    • Diabetes Brother    • Cancer Brother    • Cancer Maternal Grandmother         breast   • Diabetes Paternal Grandmother    • Dystonia Neg Hx    • Tremor Neg Hx       Social History:     Social History     Socioeconomic History   • Marital status: Single     Spouse name: None   • Number of children: None   • Years of education: None   • Highest education level: None   Occupational History   • None   Tobacco Use   • Smoking status: Former     Packs/day: 1.00     Years: 30.00     Total pack years: 30.00     Types: Cigarettes     Start date: 1961     Quit date: 1991     Years since quittin.1     Passive exposure: Past   • Smokeless tobacco: Never   Vaping Use   • Vaping Use: Never used   Substance and Sexual Activity   • Alcohol use:  Yes     Alcohol/week: 10.0 standard drinks of alcohol     Types: 10 Cans of beer per week     Comment: I usually drink a can of beer  or two after dinner   • Drug use: Never   • Sexual activity: Not Currently   Other Topics Concern   • None   Social History Narrative   • None     Social Determinants of Health     Financial Resource Strain: Low Risk  (10/11/2023)    Overall Financial Resource Strain (CARDIA)    • Difficulty of Paying Living Expenses: Not hard at all   Food Insecurity: Not on file   Transportation Needs: No Transportation Needs (10/11/2023)    PRAPARE - Transportation    • Lack of Transportation (Medical): No    • Lack of Transportation (Non-Medical): No   Physical Activity: Not on file   Stress: Not on file   Social Connections: Not on file   Intimate Partner Violence: Not on file   Housing Stability: Not on file      Medications and Allergies:     Current Outpatient Medications   Medication Sig Dispense Refill   • atorvastatin (LIPITOR) 10 mg tablet take 1 tablet by mouth every morning 90 tablet 0   • brinzolamide (AZOPT) 1 % ophthalmic suspension Administer to the right eye 2 (two) times a day      • famotidine (PEPCID) 40 MG tablet take 1 tablet by mouth once daily 60 tablet 5   • fluocinolone acetonide (DERMOTIC) 0.01 % otic oil Administer 3 drops into both ears 2 (two) times a day as needed (for itchy ears) 20 mL 11   • levothyroxine 50 mcg tablet take 1 tablet by mouth every morning 90 tablet 0   • lidocaine (Lidoderm) 5 % Apply 1 patch topically over 12 hours daily Remove & Discard patch within 12 hours or as directed by MD 30 patch 0   • travoprost (TRAVATAN-Z) 0.004 % ophthalmic solution Apply 1 drop to eye daily at bedtime Both eyes       No current facility-administered medications for this visit.      No Known Allergies   Immunizations:     Immunization History   Administered Date(s) Administered   • COVID-19 MODERNA VACC 0.25 ML IM BOOSTER 12/03/2021   • COVID-19 MODERNA VACC 0.5 ML IM 04/30/2021, 06/01/2021   • COVID-19 Moderna mRNA Vaccine 12 Yr+ 50 mcg/0.5 mL (Spikevax) 10/02/2023   • INFLUENZA 09/14/2018   • Influenza Split High Dose Preservative Free IM 10/18/2012, 10/03/2013   • Influenza, Seasonal Vaccine, Quadrivalent, Adjuvanted, .5e 09/25/2023   • Influenza, high dose seasonal 0.7 mL 08/27/2020, 09/16/2021, 10/03/2022   • Influenza, seasonal, injectable 10/20/2010, 10/26/2011, 09/08/2014, 09/12/2015   • Pneumococcal Conjugate 13-Valent 12/16/2015   • Pneumococcal Polysaccharide PPV23 07/09/2009   • influenza, trivalent, adjuvanted 09/14/2018, 09/19/2019      Health Maintenance: There are no preventive care reminders to display for this patient. Topic Date Due   • Influenza Vaccine (1) 09/01/2023      Medicare Screening Tests and Risk Assessments:     Mau Medina is here for her Subsequent Wellness visit. Health Risk Assessment:   Patient rates overall health as good. Patient feels that their physical health rating is same. Patient is satisfied with their life. Eyesight was rated as same. Hearing was rated as same. Patient feels that their emotional and mental health rating is same. Patients states they are never, rarely angry. Patient states they are never, rarely unusually tired/fatigued. Pain experienced in the last 7 days has been some. Patient's pain rating has been 1/10. Patient states that she has experienced no weight loss or gain in last 6 months. Depression Screening:   PHQ-2 Score: 0      Fall Risk Screening: In the past year, patient has experienced: no history of falling in past year      Urinary Incontinence Screening:   Patient has not leaked urine accidently in the last six months. Home Safety:  Patient does not have trouble with stairs inside or outside of their home. Patient has working smoke alarms and has working carbon monoxide detector. Home safety hazards include: none. Nutrition:   Current diet is Regular. Medications:   Patient is currently taking over-the-counter supplements. OTC medications include: see medication list. Patient is able to manage medications. Activities of Daily Living (ADLs)/Instrumental Activities of Daily Living (IADLs):   Walk and transfer into and out of bed and chair?: Yes  Dress and groom yourself?: Yes    Bathe or shower yourself?: Yes    Feed yourself?  Yes  Do your laundry/housekeeping?: Yes  Manage your money, pay your bills and track your expenses?: Yes  Make your own meals?: Yes    Do your own shopping?: Yes    Previous Hospitalizations:   Any hospitalizations or ED visits within the last 12 months?: No      Advance Care Planning:   Living will: No    Durable POA for healthcare: Yes    Advanced directive: Yes    End of Life Decisions reviewed with patient: Yes      Cognitive Screening:   Provider or family/friend/caregiver concerned regarding cognition?: No    PREVENTIVE SCREENINGS      Cardiovascular Screening:    General: Screening Not Indicated and History Lipid Disorder      Diabetes Screening:     General: Screening Current      Colorectal Cancer Screening:     General: Screening Current      Breast Cancer Screening:     General: Screening Not Indicated      Cervical Cancer Screening:    General: Screening Not Indicated      Osteoporosis Screening:    General: Screening Not Indicated and History Osteoporosis      Abdominal Aortic Aneurysm (AAA) Screening:        General: Patient Declines      Lung Cancer Screening:     General: Screening Not Indicated    Screening, Brief Intervention, and Referral to Treatment (SBIRT)    Screening  Typical number of drinks in a day: 0  Typical number of drinks in a week: 7  Interpretation: Low risk drinking behavior. AUDIT-C Screenin) How often did you have a drink containing alcohol in the past year? 4 or more times a week  2) How many drinks did you have on a typical day when you were drinking in the past year? 1 to 2  3) How often did you have 6 or more drinks on one occasion in the past year? never    AUDIT-C Score: 4  Interpretation: Score 3-12 (female): POSITIVE screen for alcohol misuse    AUDIT Screenin) How often during the last year have you found that you were not able to stop drinking once you had started?  0 - never  5) How often during the last year have you failed to do what was normally expected from you because of drinking? 0 - never  6) How often during the last year have you needed a first drink in the morning to get yourself going after a heavy drinking session? 0 - never  7) How often during the last year have you had a feeling of guilt or remorse after drinking? 0 - never  8) How often during the last year have you been unable to remember what happened the night before because you had been drinking? 0 - never  9) Have you or someone else been injured as a result of your drinking? 0 - no  10) Has a relative or friend or a doctor or another health worker been concerned about your drinking or suggested you cut down? 0 - no    AUDIT Score: 4  Interpretation: Low risk alcohol consumption    Single Item Drug Screening:  How often have you used an illegal drug (including marijuana) or a prescription medication for non-medical reasons in the past year? never    Single Item Drug Screen Score: 0  Interpretation: Negative screen for possible drug use disorder    Brief Intervention  Alcohol & drug use screenings were reviewed. No concerns regarding substance use disorder identified. Other Counseling Topics:   Car/seat belt/driving safety, skin self-exam, sunscreen and regular weightbearing exercise and calcium and vitamin D intake. No results found. Physical Exam:     /80   Pulse 70   Temp 97.5 °F (36.4 °C)   Resp 18   Ht 5' 3" (1.6 m)   Wt 48.1 kg (106 lb)   BMI 18.78 kg/m²     Physical Exam  Vitals and nursing note reviewed. Constitutional:       General: She is not in acute distress. Appearance: She is well-developed. HENT:      Head: Normocephalic and atraumatic. Eyes:      Conjunctiva/sclera: Conjunctivae normal.   Cardiovascular:      Rate and Rhythm: Normal rate and regular rhythm. Heart sounds: No murmur heard. Pulmonary:      Effort: Pulmonary effort is normal. No respiratory distress. Breath sounds: Normal breath sounds. Abdominal:      Palpations: Abdomen is soft. Tenderness: There is no abdominal tenderness. Musculoskeletal:         General: No swelling. Cervical back: Neck supple. Skin:     General: Skin is warm and dry. Capillary Refill: Capillary refill takes less than 2 seconds. Neurological:      Mental Status: She is alert.       Comments: Tremor bilateral Ue's and head   Psychiatric:         Mood and Affect: Mood normal.          Michael Garcia MD

## 2023-10-11 NOTE — PROGRESS NOTES
Assessment and Plan:     Problem List Items Addressed This Visit    None  Visit Diagnoses     Medicare annual wellness visit, subsequent    -  Primary             Preventive health issues were discussed with patient, and age appropriate screening tests were ordered as noted in patient's After Visit Summary. Personalized health advice and appropriate referrals for health education or preventive services given if needed, as noted in patient's After Visit Summary. History of Present Illness:     Patient presents for a Medicare Wellness Visit    HPI   Patient Care Team:  Catalina Betancourt MD as PCP - General  Brendan Reyna MD     Review of Systems:     Review of Systems     Problem List:     Patient Active Problem List   Diagnosis   • Essential tremor   • Colon polyp   • Hypothyroidism   • Osteoporosis   • Mixed hyperlipidemia   • Cervical dystonia   • Elevated glucose   • Glaucoma (increased eye pressure)   • COVID-19   • Acid reflux   • Hyponatremia   • 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   • Lung nodule   • Hx of adenomatous colonic polyps   • Dyspnea   • Stage 3a chronic kidney disease ([address])   • Unilateral emphysema (HCC)   • Numbness   • Spondylolisthesis of lumbar region   • Lumbar degenerative disc disease   • Lumbar disc herniation with radiculopathy      Past Medical and Surgical History:     Past Medical History:   Diagnosis Date   • Chronic kidney disease    • Colon polyp    • COVID-19 02/2021    cough, lost sense of taste for a few days   • COVID-19 vaccine series completed    • Diabetes mellitus ([address])    • Disease of thyroid gland     hypo   • Ear problems approx.  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 ears     Past Surgical History:   Procedure Laterality Date   • CATARACT EXTRACTION Bilateral    • COLONOSCOPY     • EGD     • EPIDURAL BLOCK INJECTION Right 2023    Procedure: L4 L5  TRANSFORAMINAL epidural steroid injection ([id number]); Surgeon: Kellie Montaño MD;  Location: [facility] MAIN OR;  Service: Pain Management    • UPPER GASTROINTESTINAL ENDOSCOPY        Family History:     Family History   Problem Relation Age of Onset   • Kidney disease Mother    • Throat cancer Father    • Diabetes Sister    • Rheum arthritis Sister    • Diabetes Brother         pre-diabetes   • Rheum arthritis Brother    • Diabetes Brother         DM   • COPD Brother    • Arthritis Brother    • Diabetes Brother    • Cancer Brother    • Cancer Maternal Grandmother         breast   • Diabetes Paternal Grandmother    • Dystonia Neg Hx    • Tremor Neg Hx       Social History:     Social History     Socioeconomic History   • Marital status: Single     Spouse name: None   • Number of children: None   • Years of education: None   • Highest education level: None   Occupational History   • None   Tobacco Use   • Smoking status: Former     Packs/day: 1.00     Years: 30.00     Total pack years: 30.00     Types: Cigarettes     Start date: 1961     Quit date: 1991     Years since quittin.1     Passive exposure: Past   • Smokeless tobacco: Never   Vaping Use   • Vaping Use: Never used   Substance and Sexual Activity   • Alcohol use:  Yes     Alcohol/week: 10.0 standard drinks of alcohol     Types: 10 Cans of beer per week     Comment: I usually drink a can of beer  or two after dinner   • Drug use: Never   • Sexual activity: Not Currently   Other Topics Concern   • None   Social History Narrative   • None     Social Determinants of Health     Financial Resource Strain: Low Risk  (10/11/2023)    Overall Financial Resource Strain (CARDIA)    • Difficulty of Paying Living Expenses: Not hard at all   Food Insecurity: Not on file   Transportation Needs: No Transportation Needs (10/11/2023)    PRAPARE - Transportation    • Lack of Transportation (Medical): No    • Lack of Transportation (Non-Medical): No   Physical Activity: Not on file   Stress: Not on file   Social Connections: Not on file   Intimate Partner Violence: Not on file   Housing Stability: Not on file      Medications and Allergies:     Current Outpatient Medications   Medication Sig Dispense Refill   • atorvastatin (LIPITOR) 10 mg tablet take 1 tablet by mouth every morning 90 tablet 0   • brinzolamide (AZOPT) 1 % ophthalmic suspension Administer to the right eye 2 (two) times a day      • famotidine (PEPCID) 40 MG tablet take 1 tablet by mouth once daily 60 tablet 5   • fluocinolone acetonide (DERMOTIC) 0.01 % otic oil Administer 3 drops into both ears 2 (two) times a day as needed (for itchy ears) 20 mL 11   • levothyroxine 50 mcg tablet take 1 tablet by mouth every morning 90 tablet 0   • lidocaine (Lidoderm) 5 % Apply 1 patch topically over 12 hours daily Remove & Discard patch within 12 hours or as directed by MD 30 patch 0   • travoprost (TRAVATAN-Z) 0.004 % ophthalmic solution Apply 1 drop to eye daily at bedtime Both eyes     • clobetasol (TEMOVATE) 0.05 % ointment Apply topically 2 (two) times a day (Patient not taking: Reported on 5/10/2023) 60 g 0     No current facility-administered medications for this visit.      No Known Allergies   Immunizations:     Immunization History   Administered Date(s) Administered   • COVID-19 MODERNA VACC 0.25 ML IM BOOSTER 12/03/2021   • COVID-19 MODERNA VACC 0.5 ML IM 04/30/2021, 06/01/2021   • INFLUENZA 09/14/2018   • Influenza Split High Dose Preservative Free IM 10/18/2012, 10/03/2013   • Influenza, high dose seasonal 0.7 mL 08/27/2020, 09/16/2021, 10/03/2022   • Influenza, seasonal, injectable 10/20/2010, 10/26/2011, 09/08/2014, 09/12/2015   • Pneumococcal Conjugate 13-Valent 12/16/2015   • Pneumococcal Polysaccharide PPV23 07/09/2009   • influenza, trivalent, adjuvanted 09/14/2018, 09/19/2019      Health Maintenance: There are no preventive care reminders to display for this patient. Topic Date Due   • Influenza Vaccine (1) 09/01/2023      Medicare Screening Tests and Risk Assessments:     Annual Wellness Visit  No results found.      Physical Exam:     /80   Pulse 70   Temp 97.5 °F (36.4 °C)   Resp 18   Ht 5' 3" (1.6 m)   Wt 48.1 kg (106 lb)   BMI 18.78 kg/m²     Physical Exam     Eduard Guerrero MD

## 2023-10-11 NOTE — TELEPHONE ENCOUNTER
Patient called she was just seen   dr Dejan Whitaker recommended a francisca knutson in neurology   there is no francisca knutson there    the only one who treats movement disorders is dr Mary Grace Montiel  who doesn't have time for her    any other suggestions  please let the patient know   thank you

## 2023-10-12 ENCOUNTER — TELEPHONE (OUTPATIENT)
Dept: NEUROLOGY | Facility: CLINIC | Age: 78
End: 2023-10-12

## 2023-10-12 NOTE — TELEPHONE ENCOUNTER
Pt called to make an appt. With our movement group. I made the pt aware that she was already established with Dr. Ricardo Knox. Pt would like to be seen for the tremors in her hands that are new. She is unable to write. Pt said she was never called for a f/u appt after being seen and appts were never available. Can this pt be seen by an AP? Nurses can you call to go over new concerns? Thank you.

## 2023-10-19 NOTE — TELEPHONE ENCOUNTER
Pt called again today in regards to the below- pt is asking for the nurse to call her back on her cell at:     [phone]    After 10am on Mondays  After 1pm Tues - Fri

## 2023-10-20 NOTE — TELEPHONE ENCOUNTER
Spoke with pt. She stated that she started with an essential tremor in her hands and head many years ago. She was born left handed, but the tremor was so bad in her left hand that she had to start writing with her right hand. Her tremors in her right hand are now much worse and she is having difficulty writing. Head tremor is also worse. She wears a brace on her neck because of neck pain from her head tremor. Pt would like to be seen as soon as possible. Routed to Dr. Sea Scales to advise if pt would be able to see an AP, or if she would need to follow up with Dr. Sea Scales.

## 2023-11-07 ENCOUNTER — OFFICE VISIT (OUTPATIENT)
Dept: NEUROLOGY | Facility: CLINIC | Age: 78
End: 2023-11-07
Payer: COMMERCIAL

## 2023-11-07 VITALS
WEIGHT: 108 LBS | DIASTOLIC BLOOD PRESSURE: 80 MMHG | OXYGEN SATURATION: 98 % | TEMPERATURE: 97.8 F | HEIGHT: 63 IN | BODY MASS INDEX: 19.14 KG/M2 | RESPIRATION RATE: 18 BRPM | SYSTOLIC BLOOD PRESSURE: 150 MMHG | HEART RATE: 85 BPM

## 2023-11-07 DIAGNOSIS — G24.3 CERVICAL DYSTONIA: Primary | ICD-10-CM

## 2023-11-07 DIAGNOSIS — G25.0 ESSENTIAL TREMOR: ICD-10-CM

## 2023-11-07 PROCEDURE — 99214 OFFICE O/P EST MOD 30 MIN: CPT | Performed by: PHYSICIAN ASSISTANT

## 2023-11-07 RX ORDER — ZONISAMIDE 25 MG/1
CAPSULE ORAL
Qty: 120 CAPSULE | Refills: 3 | Status: SHIPPED | OUTPATIENT
Start: 2023-11-07

## 2023-11-07 NOTE — ASSESSMENT & PLAN NOTE
Patient with cervical dystonia and head tremor. She does have improvement of both tremor and muscle tightness following Botox. Unfortunately she was lost in follow-up following her last Botox injection in 2022. I have reached out to Dr. Kristian Andrews to get her rescheduled for further injections at this time.

## 2023-11-07 NOTE — PATIENT INSTRUCTIONS
Patient with history of slowly progressive head and hand tremors since the 1970s. She has been on a number of medications for essential tremor through the years with no benefit or side effects. It appears that in 2018 she was started on a trial of zonisamide. Per the notes there was overall improvement although she stopped on her own in 2020 given she was unsure if it was still benefiting. At this time she is having worsening of her right-sided tremors which is now interfering with her ability to do things such as eat, drink and right. Main concern is difficulty signing name for her checks. At this time we discussed the option of restarting zonisamide to see if there is any benefit. Could also consider increasing the dose further as it appears she was only up to 50 mg before bed. She will start by taking zonisamide 25 mg before bed for the first week. If tolerated but no improvement of tremors she will increase to 2 tabs before bed for a week. If still no improvement she can further increase to 3 tabs for a week and then 4 tabs for a week. She will stop at the lowest effective dose. If she gets up to 4 tabs before bed and has improvement I can send in a 100 mg tab for her to take instead. Given her main concern is her ability to sign her checks, I did discuss having her talk with the bank to see if perhaps a signature stamp would be an option. If there is still no improvement of tremors with this medication or if she has any side effects then we will wean off. Alternative medication options would include a trial of Remeron. Apart from that we are limited with other medication options for her for the tremors at this time. We did also discuss the surgical options for tremor. She is not interested in DBS surgery. We did discuss MRI guided ultrasound which would certainly be an option for her.   Unfortunately we do not have anyone local who performs this procedure and she would need to travel to either [place] or [place]. This would be a hardship for her at this time so she will hold off on further evaluation regarding this. In the past she was also having neurotoxin injections for her cervical dystonia and tremor. With the injection she had improvement of both tremor as well as neck stiffness and pain. She would like to resume Botox injections at this time and I will reach out to Dr. Sol Guillermo to get her scheduled for this.

## 2023-11-07 NOTE — Clinical Note
Hi Dr. Natalio Mesa. Patient previously seen by you for Botox injections for cervical dystonia/tremor. Last injection was 9-2022. She is interested in resuming Botox at this time. Would you need to see her first or would she be able to be scheduled with you for Botox? If so please let me know how to order. Thank you!

## 2023-11-07 NOTE — PROGRESS NOTES
Patient ID: Nona Schultz is a 78 y.o. female. Assessment/Plan:    Essential tremor  Patient with history of slowly progressive head and hand tremors since the 1970s. Exam with left greater than right postural, action and wing beat tremors along with head tremor consistent with diagnosis of ET. She has been on a number of medications for essential tremor through the years with no benefit or side effects. It appears that in 2018 she was started on a trial of zonisamide. Per the notes there was overall improvement although she stopped on her own in 2020 given she was unsure if it was still benefiting. At this time she is having worsening of her right-sided tremors which is now interfering with her ability to do things such as eat, drink and right. Main concern is difficulty signing name for her checks. At this time we discussed the option of restarting zonisamide to see if there is any benefit. Could also consider increasing the dose further as it appears she was only up to 50 mg before bed. She will start by taking zonisamide 25 mg before bed for the first week. If tolerated but no improvement of tremors she will increase to 2 tabs before bed for a week. If still no improvement she can further increase to 3 tabs for a week and then 4 tabs for a week. She will stop at the lowest effective dose. If she gets up to 4 tabs before bed and has improvement I can send in a 100 mg tab for her to take instead. Given her main concern is her ability to sign her checks, I did discuss having her talk with the bank to see if perhaps a signature stamp would be an option. If there is still no improvement of tremors with this medication or if she has any side effects then we will wean off. Alternative medication options would include a trial of Remeron. Apart from that we are limited with other medication options for her for the tremors at this time. We did also discuss the surgical options for tremor. She is not interested in DBS surgery. We did discuss MRI guided ultrasound which would certainly be an option for her. Unfortunately we do not have anyone local who performs this procedure and she would need to travel to either [place] or [place]. This would be a hardship for her at this time so she will hold off on further evaluation regarding this. In the past she was also having neurotoxin injections for her cervical dystonia and tremor. With the injection she had improvement of both tremor as well as neck stiffness and pain. She would like to resume Botox injections at this time and I will reach out to Dr. Casper Acosta to get her scheduled for this. Cervical dystonia  Patient with cervical dystonia and head tremor. She does have improvement of both tremor and muscle tightness following Botox. Unfortunately she was lost in follow-up following her last Botox injection in 2022. I have reached out to Dr. Casper Acosta to get her rescheduled for further injections at this time. Subjective:    Ms. Sampson Cortes is an 78year old 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 previously treated with Botox injections, and essential tremor who presents for follow up. Review of history reveals, head tremor started in the mid 1970s. She was initially diagnosed with ET around 1976. Hand tremor was present prior to the head tremor (L>R causing her to switch dominant hands). Medications including propranolol, primidone, topiramate, gabapentin were ineffective in controlling tremor. In 2014, she was diagnosed with cervical dystonia and underwent 3 rounds of Botox treatment (1st treatment led to head droop requiring the use of a soft cervical collar but tremor control, 2nd ineffective, 3rd seemed to exacerbate the head tremor). Later restarted injections with improvement of head tremor, last set of injections was 9/19/22.      INTERVAL HISTORY:  She states that she never got a follow up appt for further injections  She does have improvement of the head tremor with the injections   She feels that her neck muscles relax   She now wears a soft brace on the neck to help with the neck pain  Following the injections she does not need to wear the brace   She does notice the effects would wear off prior to every 3 months     Patient now with worsening hand tremors   The right hand tremor is now getting worse   Right hand tremor now interferes with her doing things with the right   She had been on Zonisamide in the past, appears that it was stopped in 2020, she stopped on her own   She has tried a device on the right hand in the past which did not help   Her main concern at this time is the ability to sign her name for checks and paying the bills   She does have a weighted pen with no clear benefit   She also has special utensils     Current medications:  Klonopin     Prior medications for tremors:  Zonisamide - started in 2018 with significant benefit per notes, stopped in 2020 for unclear reason   Propranolol  Primidone   Topiramate   Neurontin     I personally reviewed and updated the ROS. Objective:    Blood pressure 150/80, pulse 85, temperature 97.8 °F (36.6 °C), temperature source Temporal, resp. rate 18, height 5' 3" (1.6 m), weight 49 kg (108 lb), SpO2 98 %. Physical Exam  Constitutional:       Appearance: Normal appearance. Eyes:      Extraocular Movements: Extraocular movements intact. Pupils: Pupils are equal, round, and reactive to light. Pulmonary:      Effort: Pulmonary effort is normal.   Neurological:      Mental Status: She is alert. Motor: Motor strength is normal.  Psychiatric:         Speech: Speech normal.         Neurological Exam  Mental Status  Alert. Oriented to person, place, time and situation. Recent and remote memory are intact. Speech is normal. Follows complex commands. Attention and concentration are normal.    Cranial Nerves  CN II: Right funduscopic exam: not visualized.  Left funduscopic exam: not visualized. CN III, IV, VI: Extraocular movements intact bilaterally. Pupils equal round and reactive to light bilaterally. CN VII: Full and symmetric facial movement. CN VIII:  Right: Hearing is decreased. Left: Hearing is decreased. CN IX, X: Palate elevates symmetrically  CN XI: Shoulder shrug strength is normal.  CN XII: Tongue midline without atrophy or fasciculations. Motor   Normal muscle tone. Strength is 5/5 throughout all four extremities. Sensory  Light touch is normal in upper and lower extremities. Coordination  Right: Finger-to-nose normal. Rapid alternating movement normal.Left: Finger-to-nose abnormality: Rapid alternating movement normal.  Mild to moderate left greater than right postural tremor  Moderate left-sided wing beat tremor, overall normal on right  Mild to moderate action tremor with finger-nose testing, left greater than right  No resting tremor  No bradykinesia  No leg tremor    Intermittent diagonal head tremor. Mild right laterocollis not visible on exam today given wearing a soft neck brace   Left trapezius hypertrophy  Mild left shoulder elevation. .    Gait  Casual gait is normal including stance, stride, and arm swing. Able to rise from chair without using arms.         ROS:    Review of Systems

## 2023-11-07 NOTE — PROGRESS NOTES
Review of Systems   Constitutional:  Negative for appetite change, fatigue and fever. HENT: Negative. Negative for hearing loss, tinnitus, trouble swallowing and voice change. Eyes: Negative. Negative for photophobia, pain and visual disturbance. Respiratory: Negative. Negative for shortness of breath. Cardiovascular: Negative. Negative for palpitations. Gastrointestinal: Negative. Negative for nausea and vomiting. Endocrine: Negative. Negative for cold intolerance. Genitourinary: Negative. Negative for dysuria, frequency and urgency. Musculoskeletal:  Negative for back pain, gait problem, myalgias and neck pain. Skin: Negative. Negative for rash. Allergic/Immunologic: Negative. Neurological:  Positive for tremors (worsened - head/hands - unable to write). Negative for dizziness, seizures, syncope, facial asymmetry, speech difficulty, weakness, light-headedness, numbness and headaches. Hematological: Negative. Does not bruise/bleed easily. Psychiatric/Behavioral: Negative. Negative for confusion, hallucinations and sleep disturbance. All other systems reviewed and are negative.

## 2023-11-07 NOTE — ASSESSMENT & PLAN NOTE
Patient with history of slowly progressive head and hand tremors since the 1970s. Exam with left greater than right postural, action and wing beat tremors along with head tremor consistent with diagnosis of ET. She has been on a number of medications for essential tremor through the years with no benefit or side effects. It appears that in 2018 she was started on a trial of zonisamide. Per the notes there was overall improvement although she stopped on her own in 2020 given she was unsure if it was still benefiting. At this time she is having worsening of her right-sided tremors which is now interfering with her ability to do things such as eat, drink and right. Main concern is difficulty signing name for her checks. At this time we discussed the option of restarting zonisamide to see if there is any benefit. Could also consider increasing the dose further as it appears she was only up to 50 mg before bed. She will start by taking zonisamide 25 mg before bed for the first week. If tolerated but no improvement of tremors she will increase to 2 tabs before bed for a week. If still no improvement she can further increase to 3 tabs for a week and then 4 tabs for a week. She will stop at the lowest effective dose. If she gets up to 4 tabs before bed and has improvement I can send in a 100 mg tab for her to take instead. Given her main concern is her ability to sign her checks, I did discuss having her talk with the bank to see if perhaps a signature stamp would be an option. If there is still no improvement of tremors with this medication or if she has any side effects then we will wean off. Alternative medication options would include a trial of Remeron. Apart from that we are limited with other medication options for her for the tremors at this time. We did also discuss the surgical options for tremor. She is not interested in DBS surgery.   We did discuss MRI guided ultrasound which would certainly be an option for her. Unfortunately we do not have anyone local who performs this procedure and she would need to travel to either [place] or [place]. This would be a hardship for her at this time so she will hold off on further evaluation regarding this. In the past she was also having neurotoxin injections for her cervical dystonia and tremor. With the injection she had improvement of both tremor as well as neck stiffness and pain. She would like to resume Botox injections at this time and I will reach out to Dr. Christoph Villanueva to get her scheduled for this.

## 2023-11-09 ENCOUNTER — OFFICE VISIT (OUTPATIENT)
Dept: OTOLARYNGOLOGY | Facility: CLINIC | Age: 78
End: 2023-11-09
Payer: COMMERCIAL

## 2023-11-09 VITALS — BODY MASS INDEX: 19.14 KG/M2 | TEMPERATURE: 97.6 F | WEIGHT: 108 LBS | HEIGHT: 63 IN

## 2023-11-09 DIAGNOSIS — H61.23 BILATERAL IMPACTED CERUMEN: Primary | ICD-10-CM

## 2023-11-09 PROCEDURE — 99213 OFFICE O/P EST LOW 20 MIN: CPT | Performed by: OTOLARYNGOLOGY

## 2023-11-09 PROCEDURE — 69210 REMOVE IMPACTED EAR WAX UNI: CPT | Performed by: OTOLARYNGOLOGY

## 2023-11-09 NOTE — PROGRESS NOTES
Assessment/Plan:  Cerumen removed. F/u in 6 months. Diagnosis ICD-10-CM Associated Orders   1. Bilateral impacted cerumen  H61.23              Subjective:      Patient ID: Sam Briggs is a 78 y.o. female. F/u for cerumen impaction. The following portions of the patient's history were reviewed and updated as appropriate: allergies, current medications, past family history, past medical history, past social history, past surgical history and problem list.    Review of Systems      Objective:      Temp 97.6 °F (36.4 °C) (Temporal)   Ht 5' 3" (1.6 m)   Wt 49 kg (108 lb)   BMI 19.13 kg/m²          Physical Exam  HENT:      Right Ear: Tympanic membrane, ear canal and external ear normal. There is impacted cerumen. Left Ear: Tympanic membrane, ear canal and external ear normal. There is impacted cerumen. PROCEDURE: Cerumen removal from ears  Impacted cerumen removal was performed by Dr. Jessenia Leyva and removed from both ears. The visualization instrument used was the operating otoscope and speculum. The ear cleaning instrument used was suction. The outcome was successful and the patient tolerated the procedure well. There were no complications.

## 2023-11-12 DIAGNOSIS — E03.9 HYPOTHYROIDISM, UNSPECIFIED TYPE: ICD-10-CM

## 2023-11-13 RX ORDER — LEVOTHYROXINE SODIUM 0.05 MG/1
TABLET ORAL
Qty: 90 TABLET | Refills: 0 | Status: SHIPPED | OUTPATIENT
Start: 2023-11-13

## 2023-12-03 NOTE — PROGRESS NOTES
Assessment and Plan:     Problem List Items Addressed This Visit     None           Preventive health issues were discussed with patient, and age appropriate screening tests were ordered as noted in patient's After Visit Summary  Personalized health advice and appropriate referrals for health education or preventive services given if needed, as noted in patient's After Visit Summary       History of Present Illness:     Patient presents for Medicare Annual Wellness visit    Patient Care Team:  Liseth Blake MD as PCP - General Erlinda Campuzano MD     Problem List:     Patient Active Problem List   Diagnosis    Essential tremor    Colon polyp    Hypothyroidism    Osteoporosis    Mixed hyperlipidemia    Cervical dystonia    Elevated glucose    Elevated serum creatinine    Glaucoma (increased eye pressure)    COVID-19    Acid reflux      Past Medical and Surgical History:     Past Medical History:   Diagnosis Date    Colon polyp     Diarrhea     Disease of thyroid gland     hypo    Dizziness     Essential tremor     hands and head    Glaucoma     both eyes    Osteoporosis     Persistent dry cough     Pre-diabetes     Stomach cramps     Wears glasses     Wears hearing aid     darien  ears     Past Surgical History:   Procedure Laterality Date    CATARACT EXTRACTION Bilateral     COLONOSCOPY        Family History:     Family History   Problem Relation Age of Onset    No Known Problems Mother     Diabetes Brother         pre-diabetes    Cancer Father         throat cancer-    Diabetes Sister     Diabetes Brother         DM    Cancer Maternal Grandmother         breast    Diabetes Paternal Grandmother     Arthritis Brother     Cancer Brother     Dystonia Neg Hx     Tremor Neg Hx       Social History:     Social History     Socioeconomic History    Marital status: Single     Spouse name: None    Number of children: None    Years of education: None    Highest education level: None Occupational History    None   Tobacco Use    Smoking status: Former Smoker     Quit date:      Years since quittin 7    Smokeless tobacco: Never Used   Vaping Use    Vaping Use: Never used   Substance and Sexual Activity    Alcohol use: Yes     Alcohol/week: 14 0 standard drinks     Types: 14 Cans of beer per week     Comment: daily beer    Drug use: Never    Sexual activity: None   Other Topics Concern    None   Social History Narrative    None     Social Determinants of Health     Financial Resource Strain:     Difficulty of Paying Living Expenses:    Food Insecurity:     Worried About Running Out of Food in the Last Year:     Ran Out of Food in the Last Year:    Transportation Needs:     Lack of Transportation (Medical):      Lack of Transportation (Non-Medical):    Physical Activity:     Days of Exercise per Week:     Minutes of Exercise per Session:    Stress:     Feeling of Stress :    Social Connections:     Frequency of Communication with Friends and Family:     Frequency of Social Gatherings with Friends and Family:     Attends Yarsani Services:     Active Member of Clubs or Organizations:     Attends Club or Organization Meetings:     Marital Status:    Intimate Partner Violence:     Fear of Current or Ex-Partner:     Emotionally Abused:     Physically Abused:     Sexually Abused:       Medications and Allergies:     Current Outpatient Medications   Medication Sig Dispense Refill    atorvastatin (LIPITOR) 10 mg tablet Take 1 tablet (10 mg total) by mouth every morning 90 tablet 3    brinzolamide (AZOPT) 1 % ophthalmic suspension Administer to the right eye every morning       clonazePAM (KlonoPIN) 0 5 mg tablet Take 1 tablet (0 5 mg total) by mouth daily 90 tablet 0    famotidine (PEPCID) 40 MG tablet Take 1 tablet (40 mg total) by mouth daily Take 1/2 hour prior to breakfast  30 tablet 3    levothyroxine 50 mcg tablet Take 1 tablet (50 mcg total) by mouth every morning 90 tablet 3    onabotulinumtoxin A (Botox) 100 units Cervical Dystonia 1 each 11    travoprost (TRAVATAN Z) 0 004 % ophthalmic solution Apply 1 drop to eye daily at bedtime Both eyes      zonisamide (ZONEGRAN) 50 MG capsule Take 1 capsule (50 mg total) by mouth daily at bedtime (Patient not taking: Reported on 8/7/2021) 90 capsule 3     No current facility-administered medications for this visit  No Known Allergies   Immunizations:     Immunization History   Administered Date(s) Administered    INFLUENZA 09/14/2018    Influenza Split High Dose Preservative Free IM 10/18/2012, 10/03/2013    Influenza, high dose seasonal 0 7 mL 08/27/2020    Influenza, seasonal, injectable 10/20/2010, 10/26/2011, 09/08/2014, 09/12/2015    Pneumococcal Conjugate 13-Valent 12/16/2015    Pneumococcal Polysaccharide PPV23 07/09/2009    SARS-CoV-2 / COVID-19 mRNA IM (Joi Darnell) 04/30/2021, 06/01/2021    influenza, trivalent, adjuvanted 09/14/2018, 09/19/2019      Health Maintenance:         Topic Date Due    Hepatitis C Screening  Never done    Breast Cancer Screening: Mammogram  11/08/2019         Topic Date Due    DTaP,Tdap,and Td Vaccines (1 - Tdap) Never done    Influenza Vaccine (1) 09/01/2021      Medicare Health Risk Assessment:     /90   Pulse 80   Temp 97 7 °F (36 5 °C)   Resp 16   Ht 5' 4" (1 626 m)   Wt 48 5 kg (107 lb)   SpO2 99%   BMI 18 37 kg/m²      Leron Meckel is here for her Subsequent Wellness visit  Health Risk Assessment:   Patient rates overall health as good  Patient feels that their physical health rating is same  Patient is very satisfied with their life  Eyesight was rated as same  Hearing was rated as slightly worse  Patient feels that their emotional and mental health rating is same  Patients states they are never, rarely angry  Patient states they are never, rarely unusually tired/fatigued  Pain experienced in the last 7 days has been none   Patient states that she has experienced no weight loss or gain in last 6 months  Depression Screening:   PHQ-2 Score: 0      Fall Risk Screening: In the past year, patient has experienced: no history of falling in past year      Urinary Incontinence Screening:   Patient has not leaked urine accidently in the last six months  Home Safety:  Patient does not have trouble with stairs inside or outside of their home  Patient has working smoke alarms and has working carbon monoxide detector  Home safety hazards include: none  Nutrition:   Current diet is Regular and Limited junk food  Medications:   Patient is currently taking over-the-counter supplements  OTC medications include: see medication list  Patient is able to manage medications  Activities of Daily Living (ADLs)/Instrumental Activities of Daily Living (IADLs):   Walk and transfer into and out of bed and chair?: Yes  Dress and groom yourself?: Yes    Bathe or shower yourself?: Yes    Feed yourself?  Yes  Do your laundry/housekeeping?: Yes  Manage your money, pay your bills and track your expenses?: Yes  Make your own meals?: Yes    Do your own shopping?: Yes    Previous Hospitalizations:   Any hospitalizations or ED visits within the last 12 months?: No      Advance Care Planning:   Living will: Yes    Advanced directive: Yes    End of Life Decisions reviewed with patient: Yes      Cognitive Screening:   Provider or family/friend/caregiver concerned regarding cognition?: No    PREVENTIVE SCREENINGS      Cardiovascular Screening:    General: Screening Not Indicated and History Lipid Disorder      Diabetes Screening:     General: Screening Current      Colorectal Cancer Screening:     General: Screening Not Indicated      Breast Cancer Screening:     General: Screening Not Indicated      Cervical Cancer Screening:    General: Screening Not Indicated      Osteoporosis Screening:    General: Screening Not Indicated and History Osteoporosis      Abdominal Aortic Aneurysm (AAA) Screening:        General: Screening Not Indicated      Lung Cancer Screening:     General: Screening Not Indicated    Screening, Brief Intervention, and Referral to Treatment (SBIRT)    Screening  Typical number of drinks in a day: 0    AUDIT-C Screenin) How often did you have a drink containing alcohol in the past year? never  2) How many drinks did you have on a typical day when you were drinking in the past year? 0  3) How often did you have 6 or more drinks on one occasion in the past year? never    AUDIT-C Score: 0  Interpretation: Score 0-2 (female): Negative screen for alcohol misuse    Single Item Drug Screening:  How often have you used an illegal drug (including marijuana) or a prescription medication for non-medical reasons in the past year? never    Single Item Drug Screen Score: 0  Interpretation: Negative screen for possible drug use disorder    Brief Intervention  Alcohol & drug use screenings were reviewed  No concerns regarding substance use disorder identified  Other Counseling Topics:   Car/seat belt/driving safety, skin self-exam, sunscreen and calcium and vitamin D intake and regular weightbearing exercise         Jose Armando Glover MD 25

## 2023-12-11 ENCOUNTER — OFFICE VISIT (OUTPATIENT)
Dept: AUDIOLOGY | Facility: CLINIC | Age: 78
End: 2023-12-11

## 2023-12-11 DIAGNOSIS — H90.3 SENSORY HEARING LOSS, BILATERAL: Primary | ICD-10-CM

## 2023-12-11 NOTE — PROGRESS NOTES
5:05 PM    Patient was informed by emergency physician that she needed for adequate evaluation for chest pain, and that by refusing the above, she is at risk MI, death, further deterioration. She is awake, alert, and she understands her condition and the risks involved in leaving. She is clinically aware of her surroundings and able to ask appropriate questions,and the nurse present confirms she is  able to make medical decisions. She verbalized knowing the risks and understood it was recommended that she stay and could also return at any time. her  was present for the discussion and also verbalized that they understood both diagnosis, risks and could return at any time. The patient was provided with warnings regarding worsening of her condition and were provided instructions to follow up with Padmini Concepcion MD tomorrow or return to the Emergency Room as soon as possible. This information was discussed by the Emergency Room Physician  and witnessed by myself, Rigoberto Salcedo RN. [address] [place] [address] "Shannan Hernandez" Yuliana Navarro was seen today (12/11/2023) for a(n) in-warranty hearing aid check of her bilateral hearing aids. Today, Ms. Harris reported that she notices that her hearing aids inch out of her ear canals as the morning progresses. She is hoping to be fit with a different size dome but is still not interested in getting fit with a custom earmold even after discussion. Otoscopy revealed Unremarkable, canal clear. Device Information     Hearing Aid Fitting Date: 5/15/2023          Left Device Right Device   Hearing Aid Make: [place] Oh   Hearing Aid Model: [place] L70-R   Serial Number: [id number]   Repair Warranty Expiration Date: 07/25/26 07/25/26   Loss/Damage Warranty Expiration Date:  07/25/26 07/25/26    Length: 2 2    Output: P P   Wax System: OmegaGenesis   Dome Size/Style: 4 mm vented 4 mm vented   Battery: 601 Numerex Serial Number: [id number]   [place] Date:  08/21/23 08/21/23       Serial Number: [id number]  Accessories: N/A    Visual inspection of the device(s) revealed no noticeable damage or defects. A listening check revealed good sound quality from the device(s). Changes to Device(s)    Dome size changed to 4 mm vented per patient comfort. Recommendations & Follow-up    Hearing aids(s) are in good working condition. Ms. Yuliana Navarro stated that she has no further questions with her hearing aid(s) and does not feed that any other adjustments are needed at this time. She will follow-up in 6 months, sooner if other problems/concerns arise or if Shannan Hernandez is having continued trouble with the fit of the hearing aids. IT was suggested that Shannan Hernandez might better be fit with custom earmolds to alleviate future fit issues and get the best sound quality. It was a pleasure working with Ms. Harris today.  She was encouraged to contact the clinic with any further questions in the meantime.     Liu Martel., CCC-A  Clinical Audiologist    [address] [address]

## 2024-01-02 DIAGNOSIS — E78.2 MIXED HYPERLIPIDEMIA: ICD-10-CM

## 2024-01-03 RX ORDER — ATORVASTATIN CALCIUM 10 MG/1
TABLET, FILM COATED ORAL
Qty: 90 TABLET | Refills: 1 | Status: SHIPPED | OUTPATIENT
Start: 2024-01-03

## 2024-01-22 ENCOUNTER — TELEPHONE (OUTPATIENT)
Dept: NEUROLOGY | Facility: CLINIC | Age: 79
End: 2024-01-22

## 2024-01-22 NOTE — TELEPHONE ENCOUNTER
Patient called to report that they requested to cancel their upcoming appointment due to seeing a Neurologist in another network                Thank you!

## 2024-02-05 DIAGNOSIS — E03.9 HYPOTHYROIDISM, UNSPECIFIED TYPE: ICD-10-CM

## 2024-02-05 RX ORDER — LEVOTHYROXINE SODIUM 0.05 MG/1
TABLET ORAL
Qty: 90 TABLET | Refills: 1 | Status: SHIPPED | OUTPATIENT
Start: 2024-02-05

## 2024-04-12 ENCOUNTER — APPOINTMENT (OUTPATIENT)
Dept: LAB | Facility: CLINIC | Age: 79
End: 2024-04-12
Payer: COMMERCIAL

## 2024-04-12 DIAGNOSIS — E03.9 HYPOTHYROIDISM, UNSPECIFIED TYPE: ICD-10-CM

## 2024-04-12 DIAGNOSIS — E78.2 MIXED HYPERLIPIDEMIA: ICD-10-CM

## 2024-04-12 DIAGNOSIS — R73.09 ELEVATED GLUCOSE: ICD-10-CM

## 2024-04-12 DIAGNOSIS — E87.1 HYPONATREMIA: ICD-10-CM

## 2024-04-12 LAB
BASOPHILS # BLD AUTO: 0.07 THOUSANDS/ÂΜL (ref 0–0.1)
BASOPHILS NFR BLD AUTO: 1 % (ref 0–1)
EOSINOPHIL # BLD AUTO: 0.23 THOUSAND/ÂΜL (ref 0–0.61)
EOSINOPHIL NFR BLD AUTO: 3 % (ref 0–6)
ERYTHROCYTE [DISTWIDTH] IN BLOOD BY AUTOMATED COUNT: 12.1 % (ref 11.6–15.1)
EST. AVERAGE GLUCOSE BLD GHB EST-MCNC: 134 MG/DL
HBA1C MFR BLD: 6.3 %
HCT VFR BLD AUTO: 39.5 % (ref 34.8–46.1)
HGB BLD-MCNC: 12.9 G/DL (ref 11.5–15.4)
IMM GRANULOCYTES # BLD AUTO: 0.02 THOUSAND/UL (ref 0–0.2)
IMM GRANULOCYTES NFR BLD AUTO: 0 % (ref 0–2)
LYMPHOCYTES # BLD AUTO: 1.71 THOUSANDS/ÂΜL (ref 0.6–4.47)
LYMPHOCYTES NFR BLD AUTO: 23 % (ref 14–44)
MCH RBC QN AUTO: 30.9 PG (ref 26.8–34.3)
MCHC RBC AUTO-ENTMCNC: 32.7 G/DL (ref 31.4–37.4)
MCV RBC AUTO: 95 FL (ref 82–98)
MONOCYTES # BLD AUTO: 0.8 THOUSAND/ÂΜL (ref 0.17–1.22)
MONOCYTES NFR BLD AUTO: 11 % (ref 4–12)
NEUTROPHILS # BLD AUTO: 4.65 THOUSANDS/ÂΜL (ref 1.85–7.62)
NEUTS SEG NFR BLD AUTO: 62 % (ref 43–75)
NRBC BLD AUTO-RTO: 0 /100 WBCS
PLATELET # BLD AUTO: 314 THOUSANDS/UL (ref 149–390)
PMV BLD AUTO: 9.8 FL (ref 8.9–12.7)
RBC # BLD AUTO: 4.18 MILLION/UL (ref 3.81–5.12)
TSH SERPL DL<=0.05 MIU/L-ACNC: 2.3 UIU/ML (ref 0.45–4.5)
WBC # BLD AUTO: 7.48 THOUSAND/UL (ref 4.31–10.16)

## 2024-04-12 PROCEDURE — 80053 COMPREHEN METABOLIC PANEL: CPT

## 2024-04-12 PROCEDURE — 84443 ASSAY THYROID STIM HORMONE: CPT

## 2024-04-12 PROCEDURE — 85025 COMPLETE CBC W/AUTO DIFF WBC: CPT

## 2024-04-12 PROCEDURE — 83036 HEMOGLOBIN GLYCOSYLATED A1C: CPT

## 2024-04-12 PROCEDURE — 80061 LIPID PANEL: CPT

## 2024-04-12 PROCEDURE — 36415 COLL VENOUS BLD VENIPUNCTURE: CPT

## 2024-04-13 LAB
ALBUMIN SERPL BCP-MCNC: 4.5 G/DL (ref 3.5–5)
ALP SERPL-CCNC: 67 U/L (ref 34–104)
ALT SERPL W P-5'-P-CCNC: 14 U/L (ref 7–52)
ANION GAP SERPL CALCULATED.3IONS-SCNC: 8 MMOL/L (ref 4–13)
AST SERPL W P-5'-P-CCNC: 19 U/L (ref 13–39)
BILIRUB SERPL-MCNC: 0.79 MG/DL (ref 0.2–1)
BUN SERPL-MCNC: 12 MG/DL (ref 5–25)
CALCIUM SERPL-MCNC: 9.4 MG/DL (ref 8.4–10.2)
CHLORIDE SERPL-SCNC: 100 MMOL/L (ref 96–108)
CHOLEST SERPL-MCNC: 167 MG/DL
CO2 SERPL-SCNC: 25 MMOL/L (ref 21–32)
CREAT SERPL-MCNC: 1.04 MG/DL (ref 0.6–1.3)
GFR SERPL CREATININE-BSD FRML MDRD: 50 ML/MIN/1.73SQ M
GLUCOSE P FAST SERPL-MCNC: 103 MG/DL (ref 65–99)
HDLC SERPL-MCNC: 75 MG/DL
LDLC SERPL CALC-MCNC: 70 MG/DL (ref 0–100)
NONHDLC SERPL-MCNC: 92 MG/DL
POTASSIUM SERPL-SCNC: 5 MMOL/L (ref 3.5–5.3)
PROT SERPL-MCNC: 7.2 G/DL (ref 6.4–8.4)
SODIUM SERPL-SCNC: 133 MMOL/L (ref 135–147)
TRIGL SERPL-MCNC: 110 MG/DL

## 2024-04-15 ENCOUNTER — RA CDI HCC (OUTPATIENT)
Dept: OTHER | Facility: HOSPITAL | Age: 79
End: 2024-04-15

## 2024-04-22 ENCOUNTER — OFFICE VISIT (OUTPATIENT)
Dept: FAMILY MEDICINE CLINIC | Facility: CLINIC | Age: 79
End: 2024-04-22
Payer: COMMERCIAL

## 2024-04-22 VITALS
BODY MASS INDEX: 18.75 KG/M2 | DIASTOLIC BLOOD PRESSURE: 80 MMHG | HEIGHT: 63 IN | RESPIRATION RATE: 16 BRPM | WEIGHT: 105.8 LBS | HEART RATE: 68 BPM | TEMPERATURE: 98 F | SYSTOLIC BLOOD PRESSURE: 148 MMHG

## 2024-04-22 DIAGNOSIS — N18.31 STAGE 3A CHRONIC KIDNEY DISEASE (HCC): ICD-10-CM

## 2024-04-22 DIAGNOSIS — R19.7 DIARRHEA, UNSPECIFIED TYPE: ICD-10-CM

## 2024-04-22 DIAGNOSIS — E87.1 HYPONATREMIA: ICD-10-CM

## 2024-04-22 DIAGNOSIS — R73.09 ELEVATED GLUCOSE: ICD-10-CM

## 2024-04-22 DIAGNOSIS — G25.0 ESSENTIAL TREMOR: ICD-10-CM

## 2024-04-22 DIAGNOSIS — E78.2 MIXED HYPERLIPIDEMIA: Primary | ICD-10-CM

## 2024-04-22 DIAGNOSIS — E03.9 HYPOTHYROIDISM, UNSPECIFIED TYPE: ICD-10-CM

## 2024-04-22 PROBLEM — J43.0 UNILATERAL EMPHYSEMA (HCC): Status: RESOLVED | Noted: 2023-01-03 | Resolved: 2024-04-22

## 2024-04-22 PROCEDURE — G2211 COMPLEX E/M VISIT ADD ON: HCPCS | Performed by: INTERNAL MEDICINE

## 2024-04-22 PROCEDURE — 99214 OFFICE O/P EST MOD 30 MIN: CPT | Performed by: INTERNAL MEDICINE

## 2024-04-22 NOTE — PROGRESS NOTES
Name: Yoana Harris      : 1945      MRN: [id number]  Encounter Provider: Morenita Todd MD  Encounter Date: 2024   Encounter department: [facility]    Assessment & Plan     1. Mixed hyperlipidemia  Assessment & Plan:  Reviewed labs with patient and will controlled on atorvastatin.  Will continue as ordered.     Orders:  -     CBC; Future  -     Comprehensive metabolic panel; Future  -     Lipid panel; Future    2. Essential tremor  Assessment & Plan:  Controlled with zonegran at the management of her neurologist.       3. Stage 3a chronic kidney disease (HCC)  Assessment & Plan:  Lab Results   Component Value Date    EGFR 50 2024    EGFR 53 10/06/2023    EGFR 58 (L) 2023    CREATININE 1.04 2024    CREATININE 0.99 10/06/2023    CREATININE 0.99 2023     Stable and will continue to monitor. Advised patient to avoid potentially nephrotoxic agents.     Orders:  -     Comprehensive metabolic panel; Future    4. Elevated glucose  Assessment & Plan:  We discussed low carb diet and exercise as tolerated.  Due to her tremor and neck pain, she is not able to exercise a lot.     Orders:  -     Hemoglobin A1C; Future; Expected date: 2024    5. Hyponatremia  Assessment & Plan:  Stable and discussed limiting free fluid and using G2 gatorade for hydration.    Orders:  -     Comprehensive metabolic panel; Future    6. Hypothyroidism, unspecified type  Assessment & Plan:  Will check TSH, clinically euthyroid.  Continue levothyroxine as ordered.     Orders:  -     TSH, 3rd generation with Free T4 reflex; Future    7. Diarrhea, unspecified type  Comments:  Likely viral, advised fluids and bland diet.  If not improving she will call or return.           Subjective     She sees Dr. Smiley next week to discuss starting back on botox injections for her neck and tremor.    Has been having diarrhea for almost a week, watery.  There is no pain or fever.  It seems to be improving.  We  reviewed her labs.  She is upset that her glucose is going up but has not been able to exercise due to her neck pain and tremor.      Review of Systems    Past Medical History:   Diagnosis Date   • Chronic kidney disease    • Colon polyp    • COVID-19 02/2021    cough, lost sense of taste for a few days   • COVID-19 vaccine series completed    • Diabetes mellitus (HCC)    • Disease of thyroid gland     hypo   • Ear problems approx. 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 ears     Past Surgical History:   Procedure Laterality Date   • CATARACT EXTRACTION Bilateral    • COLONOSCOPY     • EGD     • EPIDURAL BLOCK INJECTION Right 6/2/2023    Procedure: L4 L5  TRANSFORAMINAL epidural steroid injection ([id number]);  Surgeon: Mony Silva MD;  Location: [facility] MAIN OR;  Service: Pain Management    • UPPER GASTROINTESTINAL ENDOSCOPY       Family History   Problem Relation Age of Onset   • Kidney disease Mother    • Throat cancer Father    • Diabetes Sister    • Rheum arthritis Sister    • Diabetes Brother         pre-diabetes   • Rheum arthritis Brother    • Diabetes Brother         DM   • COPD Brother    • Arthritis Brother    • Diabetes Brother    • Cancer Brother    • Cancer Maternal Grandmother         breast   • Diabetes Paternal Grandmother    • Dystonia Neg Hx    • Tremor Neg Hx      Social History     Socioeconomic History   • Marital status: Single     Spouse name: None   • Number of children: None   • Years of education: None   • Highest education level: None   Occupational History   • None   Tobacco Use   • Smoking status: Former     Current packs/day: 0.00     Average packs/day: 1 pack/day for 30.2 years  (30.2 ttl pk-yrs)     Types: Cigarettes     Start date: 1961     Quit date: 1991     Years since quittin.6     Passive exposure: Past   • Smokeless tobacco: Never   Vaping Use   • Vaping status: Never Used   Substance and Sexual Activity   • Alcohol use: Yes     Alcohol/week: 10.0 standard drinks of alcohol     Types: 10 Cans of beer per week     Comment: I usually drink a can of beer  or two after dinner   • Drug use: Never   • Sexual activity: Not Currently   Other Topics Concern   • None   Social History Narrative   • None     Social Determinants of Health     Financial Resource Strain: Low Risk  (10/11/2023)    Overall Financial Resource Strain (CARDIA)    • Difficulty of Paying Living Expenses: Not hard at all   Food Insecurity: Not on file   Transportation Needs: No Transportation Needs (10/11/2023)    PRAPARE - Transportation    • Lack of Transportation (Medical): No    • Lack of Transportation (Non-Medical): No   Physical Activity: Not on file   Stress: Not on file   Social Connections: Not on file   Intimate Partner Violence: Not on file   Housing Stability: Not on file     Current Outpatient Medications on File Prior to Visit   Medication Sig   • atorvastatin (LIPITOR) 10 mg tablet take 1 tablet by mouth every morning   • brinzolamide (AZOPT) 1 % ophthalmic suspension Administer to the right eye 2 (two) times a day    • famotidine (PEPCID) 40 MG tablet take 1 tablet by mouth once daily   • fluocinolone acetonide (DERMOTIC) 0.01 % otic oil Administer 3 drops into both ears 2 (two) times a day as needed (for itchy ears)   • levothyroxine 50 mcg tablet take 1 tablet by mouth every morning   • lidocaine (Lidoderm) 5 % Apply 1 patch topically over 12 hours daily Remove & Discard patch within 12 hours or as directed by MD   • travoprost (TRAVATAN-Z) 0.004 % ophthalmic solution Apply 1 drop to eye daily at bedtime Both eyes   • zonisamide (Zonegran) 25 mg capsule Take 1 tab before bed x1 week.   "Slowly increase every week by a tab up to 4 tabs before bed.  Stop at lowest effective dose     No Known Allergies  Immunization History   Administered Date(s) Administered   • COVID-19 MODERNA VACC 0.25 ML IM BOOSTER 12/03/2021   • COVID-19 MODERNA VACC 0.5 ML IM 04/30/2021, 06/01/2021   • COVID-19 Moderna mRNA Vaccine 12 Yr+ 50 mcg/0.5 mL (Spikevax) 10/02/2023   • INFLUENZA 09/14/2018   • Influenza Split High Dose Preservative Free IM 10/18/2012, 10/03/2013   • Influenza, Seasonal Vaccine, Quadrivalent, Adjuvanted, .5e 09/25/2023   • Influenza, high dose seasonal 0.7 mL 08/27/2020, 09/16/2021, 10/03/2022   • Influenza, seasonal, injectable 10/20/2010, 10/26/2011, 09/08/2014, 09/12/2015   • Pneumococcal Conjugate 13-Valent 12/16/2015   • Pneumococcal Polysaccharide PPV23 07/09/2009   • influenza, trivalent, adjuvanted 09/14/2018, 09/19/2019       Objective     /80   Pulse 68   Temp 98 °F (36.7 °C)   Resp 16   Ht 5' 3\" (1.6 m)   Wt 48 kg (105 lb 12.8 oz)   BMI 18.74 kg/m²     Physical Exam  Morenita Todd MD    "

## 2024-04-22 NOTE — ASSESSMENT & PLAN NOTE
We discussed low carb diet and exercise as tolerated.  Due to her tremor and neck pain, she is not able to exercise a lot.

## 2024-04-22 NOTE — ASSESSMENT & PLAN NOTE
Lab Results   Component Value Date    EGFR 50 04/12/2024    EGFR 53 10/06/2023    EGFR 58 (L) 04/03/2023    CREATININE 1.04 04/12/2024    CREATININE 0.99 10/06/2023    CREATININE 0.99 04/03/2023     Stable and will continue to monitor. Advised patient to avoid potentially nephrotoxic agents.

## 2024-04-25 ENCOUNTER — PATIENT MESSAGE (OUTPATIENT)
Dept: FAMILY MEDICINE CLINIC | Facility: CLINIC | Age: 79
End: 2024-04-25

## 2024-04-25 DIAGNOSIS — R19.7 DIARRHEA, UNSPECIFIED TYPE: Primary | ICD-10-CM

## 2024-04-29 ENCOUNTER — APPOINTMENT (OUTPATIENT)
Dept: LAB | Facility: CLINIC | Age: 79
End: 2024-04-29

## 2024-04-30 ENCOUNTER — APPOINTMENT (OUTPATIENT)
Dept: LAB | Facility: CLINIC | Age: 79
End: 2024-04-30
Payer: COMMERCIAL

## 2024-04-30 DIAGNOSIS — R19.7 DIARRHEA, UNSPECIFIED TYPE: ICD-10-CM

## 2024-04-30 PROCEDURE — 87505 NFCT AGENT DETECTION GI: CPT

## 2024-04-30 PROCEDURE — 87177 OVA AND PARASITES SMEARS: CPT

## 2024-04-30 PROCEDURE — 87209 SMEAR COMPLEX STAIN: CPT

## 2024-05-01 LAB
C COLI+JEJUNI TUF STL QL NAA+PROBE: NEGATIVE
C DIFF TOX GENS STL QL NAA+PROBE: NEGATIVE
EC STX1+STX2 GENES STL QL NAA+PROBE: NEGATIVE
SALMONELLA SP SPAO STL QL NAA+PROBE: NEGATIVE
SHIGELLA SP+EIEC IPAH STL QL NAA+PROBE: NEGATIVE

## 2024-05-09 ENCOUNTER — NURSE TRIAGE (OUTPATIENT)
Age: 79
End: 2024-05-09

## 2024-05-09 NOTE — TELEPHONE ENCOUNTER
" During the night has several bought's.  Then a few in the morning.  Able to work during the day without difficulty.   She will get imodium OTC and a pro biotic and see if that will help slow it down.   Patient states that she thinks she has IBS as she has chronic diarrhea.   Patient will call us next week to let us know how it is going for her.     Reason for Disposition  • MILD-MODERATE diarrhea (e.g., 1-6 times / day more than normal)    Answer Assessment - Initial Assessment Questions  1. DIARRHEA SEVERITY: \"How bad is the diarrhea?\" \"How many extra stools have you had in the past 24 hours than normal?\"    During the night has several bought's.  Then a few in the morning.  Able to work during the day without difficulty.   She will get imodium OTC and a pro biotic and see if that will help slow it down.   Patient states that she thinks she has IBS as she has chronic diarrhea.   Patient will call us next week to let us know how it is going for her.    Protocols used: Diarrhea-ADULT-[place]    "

## 2024-05-09 NOTE — TELEPHONE ENCOUNTER
Last ov 7/14/23 Dr. Gamboa, Procedure EGD 12/6/21, colon 8/26/20  Saw pcp 4/22/24, completed stool studies ordered 4/25/24 C Diff, O/P, Stool enteric panel all negative. Notes that she took Kaopectate without relief and caused intestinal pain.      Returned call, no answer, left message requesting call back and if I am not available she can speak with any nurse.

## 2024-05-09 NOTE — TELEPHONE ENCOUNTER
Regarding: diarrhea  ----- Message from Estefany Oconnor sent at 5/9/2024  1:46 PM EDT -----  Pt has chronic diarrhea and would like some relief. I will place her on the waitlist, ov scheduled for 7/25/24 but she would like sooner

## 2024-05-16 ENCOUNTER — NURSE TRIAGE (OUTPATIENT)
Age: 79
End: 2024-05-16

## 2024-05-16 NOTE — TELEPHONE ENCOUNTER
Regarding: chronic diarrhea  ----- Message from Belinda CLEMENTE sent at 5/16/2024  8:41 AM EDT -----  Pt returning call to follow up on her diarrhea. She states she tried the immodium and probiotic. She states nothing is helping. She states it happens late at night. She has 5 or 6 bouts during the night and then it subsides until late late afternoon early evening. Pt states she will be leaving for work at 9am

## 2024-05-16 NOTE — TELEPHONE ENCOUNTER
I spoke with patient and she is aware of recommendations. She verbally understood my instructions. No further action needed.

## 2024-05-16 NOTE — TELEPHONE ENCOUNTER
"Last ov 7/14/23 Dr. Gamboa (GERD), Procedure EGD 12/6/21, colon 8/26/20, scheduled for follow up with Dr. Pina 7/25/24    Patient continues with frequent episodes of loose stool (pudding like consistency) mostly throughout the night. She provided example on Saturday had six episodes from 5:30 am through 7:40 pm and then took an Imodium and the next BM  Sunday 10 am and then no bowel movement until Monday night 11 pm. She states that is usual pattern from frequency to nothing after taking one Imodium. She notes cramping prior to episodes and relief after BM. No lifestyle changes, she does admit to not eating much, she hydrates as instructed by nephrology due to hx of CKD. I did advise she start fiber supplement to try to bulk up stool and continue probiotic.    Patient does not want to continue this pattern that occurs while taking the Imodium. She has tried Kaopectate in past without improvement.   Please review/advise.      Reason for Disposition   MODERATE diarrhea (e.g., 4-6 times / day more than normal) and present > 48 hours (2 days)    Answer Assessment - Initial Assessment Questions  1. DIARRHEA SEVERITY: \"How bad is the diarrhea?\" \"How many extra stools have you had in the past 24 hours than normal?\"     - NO DIARRHEA (SCALE 0)    - MILD (SCALE 1-3): Few loose or mushy BMs; increase of 1-3 stools over normal daily number of stools; mild increase in ostomy output.    -  MODERATE (SCALE 4-7): Increase of 4-6 stools daily over normal; moderate increase in ostomy output.  * SEVERE (SCALE 8-10; OR 'WORST POSSIBLE'): Increase of 7 or more stools daily over normal; moderate increase in ostomy output; incontinence.      moderate  2. ONSET: \"When did the diarrhea begin?\"       Started first week of April   3. BM CONSISTENCY: \"How loose or watery is the diarrhea?\"       Consistency of chocolate pudding color changes can be yellow  4. VOMITING: \"Are you also vomiting?\" If Yes, ask: \"How many times in the past 24 " "hours?\"       Denies, but not eating but will get dizzy/nausea  5. ABDOMINAL PAIN: \"Are you having any abdominal pain?\" If Yes, ask: \"What does it feel like?\" (e.g., crampy, dull, intermittent, constant)       Cramping prior to episodes  6. ABDOMINAL PAIN SEVERITY: If present, ask: \"How bad is the pain?\"  (e.g., Scale 1-10; mild, moderate, or severe)    - MILD (1-3): doesn't interfere with normal activities, abdomen soft and not tender to touch     - MODERATE (4-7): interferes with normal activities or awakens from sleep, tender to touch     - SEVERE (8-10): excruciating pain, doubled over, unable to do any normal activities        moderate  7. ORAL INTAKE: If vomiting, \"Have you been able to drink liquids?\" \"How much fluids have you had in the past 24 hours?\"      hydrating  8. HYDRATION: \"Any signs of dehydration?\" (e.g., dry mouth [not just dry lips], too weak to stand, dizziness, new weight loss) \"When did you last urinate?\"      denies  9. EXPOSURE: \"Have you traveled to a foreign country recently?\" \"Have you been exposed to anyone with diarrhea?\" \"Could you have eaten any food that was spoiled?\"      denies  10. ANTIBIOTIC USE: \"Are you taking antibiotics now or have you taken antibiotics in the past 2 months?\"        denies  11. OTHER SYMPTOMS: \"Do you have any other symptoms?\" (e.g., fever, blood in stool)        denies  12. PREGNANCY: \"Is there any chance you are pregnant?\" \"When was your last menstrual period?\"        N/A    Protocols used: Diarrhea-ADULT-[place]    "

## 2024-05-23 ENCOUNTER — OFFICE VISIT (OUTPATIENT)
Dept: OTOLARYNGOLOGY | Facility: CLINIC | Age: 79
End: 2024-05-23
Payer: COMMERCIAL

## 2024-05-23 VITALS — HEIGHT: 63 IN | WEIGHT: 105 LBS | TEMPERATURE: 98.1 F | BODY MASS INDEX: 18.61 KG/M2

## 2024-05-23 DIAGNOSIS — H61.21 IMPACTED CERUMEN OF RIGHT EAR: Primary | ICD-10-CM

## 2024-05-23 DIAGNOSIS — L29.9 ITCHING OF EAR: ICD-10-CM

## 2024-05-23 PROCEDURE — 99213 OFFICE O/P EST LOW 20 MIN: CPT | Performed by: OTOLARYNGOLOGY

## 2024-05-23 PROCEDURE — 69210 REMOVE IMPACTED EAR WAX UNI: CPT | Performed by: OTOLARYNGOLOGY

## 2024-05-23 RX ORDER — FLUOCINOLONE ACETONIDE 0.11 MG/ML
3 OIL AURICULAR (OTIC) 2 TIMES DAILY PRN
Qty: 20 ML | Refills: 11 | Status: SHIPPED | OUTPATIENT
Start: 2024-05-23

## 2024-05-23 NOTE — PROGRESS NOTES
"Assessment/Plan:  Pt was not able to tolerate complete disimpaction of AD.  Begin DermOtic drops daily, and f/u in 1 wk.      Diagnosis ICD-10-CM Associated Orders   1. Impacted cerumen of right ear  H61.21       2. Itching of ear  L29.9 fluocinolone acetonide (DERMOTIC) 0.01 % otic oil             Subjective:      Patient ID: Yoana Harris is a 79 y.o. female.    F/u for cerumen impaction and itchy ears.        The following portions of the patient's history were reviewed and updated as appropriate: allergies, current medications, past family history, past medical history, past social history, past surgical history and problem list.    Review of Systems      Objective:      Temp 98.1 °F (36.7 °C) (Temporal)   Ht 5' 3\" (1.6 m)   Wt 47.6 kg (105 lb)   BMI 18.60 kg/m²          Physical Exam  HENT:      Right Ear: Tympanic membrane, ear canal and external ear normal. There is impacted cerumen.      Left Ear: Tympanic membrane, ear canal and external ear normal.         PROCEDURE: Cerumen removal from ears  Impacted cerumen removal was performed by Dr. Dodson and removed from the right ear.   The visualization instrument used was the operating otoscope and speculum. The ear cleaning instrument used was suction and alligator forceps. The outcome was partially successful and the patient tolerated the procedure well. There were no complications.  "

## 2024-05-30 ENCOUNTER — OFFICE VISIT (OUTPATIENT)
Dept: OTOLARYNGOLOGY | Facility: CLINIC | Age: 79
End: 2024-05-30
Payer: COMMERCIAL

## 2024-05-30 VITALS — WEIGHT: 105 LBS | HEIGHT: 63 IN | BODY MASS INDEX: 18.61 KG/M2

## 2024-05-30 DIAGNOSIS — H61.23 BILATERAL IMPACTED CERUMEN: Primary | ICD-10-CM

## 2024-05-30 PROCEDURE — 69210 REMOVE IMPACTED EAR WAX UNI: CPT | Performed by: OTOLARYNGOLOGY

## 2024-05-30 PROCEDURE — 99212 OFFICE O/P EST SF 10 MIN: CPT | Performed by: OTOLARYNGOLOGY

## 2024-05-30 NOTE — PROGRESS NOTES
"Assessment/Plan:  Cerumen removed.  F/u in 6 months.      Diagnosis ICD-10-CM Associated Orders   1. Bilateral impacted cerumen  H61.23              Subjective:      Patient ID: Yoana Harris is a 79 y.o. female.    F/u for cerumen impaction.  Pt has been using baby oil drops as directed.        The following portions of the patient's history were reviewed and updated as appropriate: allergies, current medications, past family history, past medical history, past social history, past surgical history and problem list.    Review of Systems      Objective:      Ht 5' 3\" (1.6 m)   Wt 47.6 kg (105 lb)   BMI 18.60 kg/m²          Physical Exam  HENT:      Right Ear: Tympanic membrane, ear canal and external ear normal. There is impacted cerumen.      Left Ear: Tympanic membrane, ear canal and external ear normal. There is impacted cerumen.         PROCEDURE: Cerumen removal from ears  Impacted cerumen removal was performed by Dr. Dodson and removed from both ears.   The visualization instrument used was the operating otoscope and speculum. The ear cleaning instrument used was suction. The outcome was successful and the patient tolerated the procedure well. There were no complications.  "

## 2024-06-03 ENCOUNTER — TELEPHONE (OUTPATIENT)
Dept: GASTROENTEROLOGY | Facility: CLINIC | Age: 79
End: 2024-06-03

## 2024-06-03 NOTE — TELEPHONE ENCOUNTER
Pt stopped in still complaining of chronic diarrhea. Pt expressed her old GI doctor (Dr Gamboa) retired, and she'd like to transfer to the [place] office to get in sooner. Unfortunately, she is at the earliest I can see, but I advised her I will speak to my manager once she is out of her meeting and give her a call back. Pt agrees, and we confirmed her cell number

## 2024-06-10 ENCOUNTER — OFFICE VISIT (OUTPATIENT)
Dept: AUDIOLOGY | Facility: CLINIC | Age: 79
End: 2024-06-10

## 2024-06-10 DIAGNOSIS — H90.3 SENSORINEURAL HEARING LOSS (SNHL), BILATERAL: Primary | ICD-10-CM

## 2024-06-10 NOTE — PROGRESS NOTES
"Hearing Aid Visit:    Name:  Yoana Harris  :  1945  Age:  79 y.o.  MRN:  [id number]  Date of Evaluation: 06/10/24     HISTORY:    Yoana Harris was seen today (6/10/2024) for a(n) in-warranty hearing aid check of her bilateral hearing aids. Today, Yoana reports that the aids continue to \"slide out\" and she has to wear her old hearing aids often for listening to TV as the aids are not \"loud enough\" and she has to have the TV on 5 instead of 3.    Hearing Aid Fitting Date: 5/15/2023          Left Device Right Device   Hearing Aid Make: Phonak Phonak   Hearing Aid Model: Slim L70-R Slim L70-R   Serial Number: [id number]   Repair Warranty Expiration Date: 26   Loss/Damage Warranty Expiration Date:  26    Length: # 2  added lock  # 2  added lock     Output: P P   Wax System: Cerushield Cerushield   Dome Size/Style: small vented small vented   Battery: Lithium-ion Rechargeable Lithium-ion Rechargeable   Earmold Serial Number: [id number]   Earmold Warranty Date:  23       Serial Number: [id number]  Accessories: N/A    ACTION/ADJUSTMENTS:  Canals are both clear   Changed dome from medium vented to small vented and she felt they fit better  Added canal locks to both aids for more retention.     Provided one set of domes and 3 packs of Cerushields     RECOMMENDATIONS:   RTO 2024 for regular maintenance or return sooner should the hearing aids continue to be an issue    Schedule 2 year audio for 3/2025      Safia Rivera, CCC-A  Clinical Audiologist  [place] [id number]  [facility] AUDIOLOGY  [address] [facility] [address]  "

## 2024-07-01 DIAGNOSIS — E78.2 MIXED HYPERLIPIDEMIA: ICD-10-CM

## 2024-07-01 RX ORDER — ATORVASTATIN CALCIUM 10 MG/1
TABLET, FILM COATED ORAL
Qty: 90 TABLET | Refills: 1 | Status: SHIPPED | OUTPATIENT
Start: 2024-07-01

## 2024-07-09 ENCOUNTER — OFFICE VISIT (OUTPATIENT)
Dept: AUDIOLOGY | Facility: CLINIC | Age: 79
End: 2024-07-09

## 2024-07-09 DIAGNOSIS — H90.3 SENSORINEURAL HEARING LOSS (SNHL), BILATERAL: Primary | ICD-10-CM

## 2024-07-10 NOTE — PROGRESS NOTES
Hearing Aid Visit:    Name:  Yoana Harris  :  1945  Age:  79 y.o.  MRN:  [id number]  Date of Evaluation: 2024    HISTORY:    Yoana Harris was seen today (2024) for a(n) in-warranty hearing aid check of her bilateral hearing aids. Today, Yoana reports continued concerns about her TV being too loud as compared to the 1st fit.    DEVICE INFORMATION:  Hearing Aid Fitting Date: 5/15/2023          Left Device Right Device   Hearing Aid Make: Phonak Phonak   Hearing Aid Model: Slim L70-R Slim L70-R   Serial Number: [id number]   Repair Warranty Expiration Date: 26   Loss/Damage Warranty Expiration Date:  26    Length: # 2  added lock  # 2  added lock     Output: M M   Wax System: invi   Dome Size/Style: medium vented/closed  Medium vented/closed    Battery: Lithium-ion Rechargeable Lithium-ion Rechargeable   Earmold Serial Number: does not use [id number]   Earmold Warranty Date:  23       Serial Number: [id number]  Accessories: N/A    ACTION/ADJUSTMENTS:  Changed the power receivers to Medium due to space and comfort in her canals.  The change does not compromise the output of her hearing aids currently.    She liked the medium dome and lock, so we kept that.    Checked programming and made all adjustments     RECOMMENDATIONS:   RTO 2024 for regular maintenance / she will call with any concerns prior     Safia Rivera, CCC-A  Clinical Audiologist  [place] [phone]  [facility] AUDIOLOGY  [address] [facility] [address]

## 2024-07-25 ENCOUNTER — OFFICE VISIT (OUTPATIENT)
Dept: GASTROENTEROLOGY | Facility: CLINIC | Age: 79
End: 2024-07-25
Payer: COMMERCIAL

## 2024-07-25 ENCOUNTER — APPOINTMENT (OUTPATIENT)
Dept: LAB | Facility: CLINIC | Age: 79
End: 2024-07-25

## 2024-07-25 VITALS
DIASTOLIC BLOOD PRESSURE: 68 MMHG | SYSTOLIC BLOOD PRESSURE: 100 MMHG | WEIGHT: 102 LBS | HEIGHT: 63 IN | BODY MASS INDEX: 18.07 KG/M2 | HEART RATE: 102 BPM

## 2024-07-25 DIAGNOSIS — R19.8 CHANGE IN BOWEL FUNCTION: Primary | ICD-10-CM

## 2024-07-25 DIAGNOSIS — R63.4 WEIGHT LOSS, ABNORMAL: ICD-10-CM

## 2024-07-25 PROCEDURE — G2211 COMPLEX E/M VISIT ADD ON: HCPCS | Performed by: INTERNAL MEDICINE

## 2024-07-25 PROCEDURE — 99213 OFFICE O/P EST LOW 20 MIN: CPT | Performed by: INTERNAL MEDICINE

## 2024-07-25 RX ORDER — POLYETHYLENE GLYCOL 3350, SODIUM CHLORIDE, SODIUM BICARBONATE, POTASSIUM CHLORIDE 420; 11.2; 5.72; 1.48 G/4L; G/4L; G/4L; G/4L
4000 POWDER, FOR SOLUTION ORAL ONCE
Qty: 4000 ML | Refills: 0 | Status: SHIPPED | OUTPATIENT
Start: 2024-07-25 | End: 2024-07-25

## 2024-07-25 NOTE — PROGRESS NOTES
[facility] - Outpatient Follow-up Note  Yoana Harris 79 y.o. female MRN: [id number]  Encounter: [id number]          ASSESSMENT AND PLAN:      1. Change in bowel function    2. Weight loss, abnormal  Will evaluate further as below.  Will plan colonoscopy.  Will have her increase her dosing of Imodium to see if this provides any benefit.  Contingency might include a trial of cholestyramine    - Colonoscopy; Future  - polyethylene glycol-electrolytes (TriLyte) 4000 mL solution; Take 4,000 mL by mouth once for 1 dose Take 4000 mL by mouth once for 1 dose. Use as directed  Dispense: 4000 mL; Refill: 0  - Pancreatic elastase, fecal; Future  - Calprotectin,Fecal; Future  - Giardia antigen; Future    ______________________________________________________________________    SUBJECTIVE: Patient with history of GERD, Manjarrez's and adenomatous polyps in the colon presents with approximate 4 months of diarrhea.  Symptoms began acutely in early April and have persisted since that time.  Reports essentially liquid stool 4-7 times daily, sometimes very early in the morning or at night.  No associated abdominal pain, nausea or vomiting, fever or chills, jaundice or rash, blood.  She has lost approximately 5 to 6 pounds.  Prior evaluation including labs revealed negative C. difficile, stool enteric bacterial panel and O&P as well as normal TSH.  Does not remember any preceding illness.  No new medications.  Has tried Kaopectate, OTC fiber and Imodium without improvement      REVIEW OF SYSTEMS:    Review of Systems   Constitutional: Positive for weight loss. Negative for fever.   Musculoskeletal:  Negative for myalgias.   Gastrointestinal:  Positive for abdominal pain, diarrhea and flatus. Negative for anorexia, constipation, hematochezia, melena, nausea and vomiting.   Genitourinary:  Negative for dysuria and hematuria.   Neurological:  Negative for headaches.    Answers submitted by the patient for  this visit:  Abdominal Pain Questionnaire (Submitted on 7/20/2024)  Chief Complaint: Abdominal pain  Chronicity: recurrent  Onset: more than 1 month ago  Onset quality: undetermined  Frequency: rarely  Episode duration: 1 Hours  Progression since onset: resolved  Pain location: suprapubic region  Pain quality: cramping  Radiates to: does not radiate  arthralgias: No  belching: No  Relieved by: bowel movements        Historical Information   Past Medical History:   Diagnosis Date    Chronic kidney disease     Colon polyp     COVID-19 02/2021    cough, lost sense of taste for a few days    COVID-19 vaccine series completed     Diabetes mellitus (HCC)     Disease of thyroid gland     hypo    Ear problems approx. 2019    impacted wax    Essential tremor     hands and head    GERD (gastroesophageal reflux disease)     Glaucoma     both eyes    HL (hearing loss) around 2012    Hyperlipidemia     Osteoporosis     Persistent dry cough     d/t acid reflux    Pre-diabetes     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., 2021    Wears glasses     Wears hearing aid     darien. ears     Past Surgical History:   Procedure Laterality Date    CATARACT EXTRACTION Bilateral     COLONOSCOPY      EGD      EPIDURAL BLOCK INJECTION Right 6/2/2023    Procedure: L4 L5  TRANSFORAMINAL epidural steroid injection ([id number]);  Surgeon: Mony Silva MD;  Location: [facility] MAIN OR;  Service: Pain Management     UPPER GASTROINTESTINAL ENDOSCOPY       Social History   Social History     Substance and Sexual Activity   Alcohol Use Yes    Alcohol/week: 10.0 standard drinks of alcohol    Types: 10 Cans of beer per week    Comment: I usually drink a can of beer  or two after dinner     Social History     Substance and Sexual Activity   Drug Use Never     Social History     Tobacco Use   Smoking Status Former    Current packs/day: 0.00    Average packs/day: 1 pack/day for 30.2 years (30.2 ttl pk-yrs)    Types: Cigarettes    Start date: 6/1/1961    Quit date: 8/30/1991     "Years since quittin.9    Passive exposure: Past   Smokeless Tobacco Never     Family History   Problem Relation Age of Onset    Kidney disease Mother     Throat cancer Father     Diabetes Sister     Rheum arthritis Sister     Diabetes Brother         pre-diabetes    Rheum arthritis Brother     Diabetes Brother         DM    COPD Brother     Arthritis Brother     Diabetes Brother     Cancer Brother     Cancer Maternal Grandmother         breast    Diabetes Paternal Grandmother     Dystonia Neg Hx     Tremor Neg Hx        Meds/Allergies       Current Outpatient Medications:     atorvastatin (LIPITOR) 10 mg tablet    brinzolamide (AZOPT) 1 % ophthalmic suspension    famotidine (PEPCID) 40 MG tablet    fluocinolone acetonide (DERMOTIC) 0.01 % otic oil    levothyroxine 50 mcg tablet    polyethylene glycol-electrolytes (TriLyte) 4000 mL solution    travoprost (TRAVATAN-Z) 0.004 % ophthalmic solution    lidocaine (Lidoderm) 5 %    zonisamide (Zonegran) 25 mg capsule    Current Facility-Administered Medications:     Botulinum Toxin Type A SOLR 200 Units, 200 Units, Injection, Q3 Months    No Known Allergies        Objective     Blood pressure 100/68, pulse 102, height 5' 3\" (1.6 m), weight 46.3 kg (102 lb). Body mass index is 18.07 kg/m².      PHYSICAL EXAM:      Physical Exam  Vitals and nursing note reviewed.   Constitutional:       General: She is not in acute distress.     Appearance: She is not ill-appearing.   HENT:      Head: Normocephalic and atraumatic.   Eyes:      General: No scleral icterus.     Extraocular Movements: Extraocular movements intact.   Cardiovascular:      Rate and Rhythm: Normal rate and regular rhythm.   Pulmonary:      Effort: Pulmonary effort is normal. No respiratory distress.   Abdominal:      General: There is no distension.      Palpations: Abdomen is soft.      Tenderness: There is no abdominal tenderness. There is no guarding.   Musculoskeletal:      Right lower leg: No edema.      " Left lower leg: No edema.   Skin:     General: Skin is warm and dry.      Coloration: Skin is not cyanotic.      Findings: No erythema.   Neurological:      General: No focal deficit present.      Mental Status: She is alert and oriented to person, place, and time.   Psychiatric:         Mood and Affect: Mood normal.         Behavior: Behavior normal.          Lab Results:   No visits with results within 1 Day(s) from this visit.   Latest known visit with results is:   Appointment on 04/30/2024   Component Date Value    Salmonella sp PCR 04/30/2024 Negative     Shigella sp/Enteroinvasi* 04/30/2024 Negative     Campylobacter sp (jejuni* 04/30/2024 Negative     Shiga toxin 1/Shiga toxi* 04/30/2024 Negative     C.difficile toxin by PCR 04/30/2024 Negative          Radiology Results:   No results found.

## 2024-07-25 NOTE — PATIENT INSTRUCTIONS
Scheduled date of colonoscopy (as of today):10/1/2024  Physician performing colonoscopy:Dr. Pina  Location of colonoscopy:[facility]  Bowel prep reviewed with patient:marcial  Instructions reviewed with patient by:jacki  Clearances: na

## 2024-07-29 ENCOUNTER — LAB (OUTPATIENT)
Dept: LAB | Facility: CLINIC | Age: 79
End: 2024-07-29
Payer: COMMERCIAL

## 2024-07-29 DIAGNOSIS — R19.8 CHANGE IN BOWEL FUNCTION: ICD-10-CM

## 2024-07-29 DIAGNOSIS — R63.4 WEIGHT LOSS, ABNORMAL: ICD-10-CM

## 2024-07-29 PROCEDURE — 82653 EL-1 FECAL QUANTITATIVE: CPT

## 2024-07-29 PROCEDURE — 87329 GIARDIA AG IA: CPT

## 2024-07-29 PROCEDURE — 83993 ASSAY FOR CALPROTECTIN FECAL: CPT

## 2024-07-31 LAB
CALPROTECTIN STL-MCNC: 282 ÂΜG/G
ELASTASE PANC STL-MCNT: 538 UG/G
G LAMBLIA AG STL QL IA: NEGATIVE

## 2024-08-01 NOTE — RESULT ENCOUNTER NOTE
Please call the patient regarding his result.  Fecal calprotectin is significantly elevated suggesting bowel inflammation.  Please try to move up her colonoscopy which is currently not scheduled until October.

## 2024-08-02 ENCOUNTER — TELEPHONE (OUTPATIENT)
Dept: GASTROENTEROLOGY | Facility: CLINIC | Age: 79
End: 2024-08-02

## 2024-08-02 NOTE — TELEPHONE ENCOUNTER
Pt r/s to 8/21/24 . Please check when pt calls back to see if she is on any blood thinners. Thanks Galina       Scheduled date of colonoscopy (as of today):8/21/24  Physician performing colonoscopy: Dr Pina   Location of colonoscopy: [facility]   Bowel prep reviewed with patient:marcial   Instructions reviewed with patient by:acosta  Clearances: none     ----- Message from [employer] sent at 8/2/2024  8:32 AM EDT -----  Left detailed message regarding results. Dr. Pina would like colonoscopy moved up sooner if possible.     Please reach out to pt to try to move up colonoscopy. Thank you

## 2024-08-04 DIAGNOSIS — E03.9 HYPOTHYROIDISM, UNSPECIFIED TYPE: ICD-10-CM

## 2024-08-04 RX ORDER — LEVOTHYROXINE SODIUM 0.05 MG/1
TABLET ORAL
Qty: 90 TABLET | Refills: 1 | Status: SHIPPED | OUTPATIENT
Start: 2024-08-04

## 2024-08-07 ENCOUNTER — ANESTHESIA EVENT (OUTPATIENT)
Dept: ANESTHESIOLOGY | Facility: HOSPITAL | Age: 79
End: 2024-08-07

## 2024-08-07 ENCOUNTER — ANESTHESIA (OUTPATIENT)
Dept: ANESTHESIOLOGY | Facility: HOSPITAL | Age: 79
End: 2024-08-07

## 2024-08-10 DIAGNOSIS — K22.70 BARRETT'S ESOPHAGUS WITHOUT DYSPLASIA: ICD-10-CM

## 2024-08-10 DIAGNOSIS — R05.3 CHRONIC COUGH: ICD-10-CM

## 2024-08-10 DIAGNOSIS — K21.00 GASTROESOPHAGEAL REFLUX DISEASE WITH ESOPHAGITIS WITHOUT HEMORRHAGE: ICD-10-CM

## 2024-08-11 RX ORDER — FAMOTIDINE 40 MG/1
TABLET, FILM COATED ORAL
Qty: 60 TABLET | Refills: 5 | Status: SHIPPED | OUTPATIENT
Start: 2024-08-11

## 2024-08-21 ENCOUNTER — ANESTHESIA EVENT (OUTPATIENT)
Dept: GASTROENTEROLOGY | Facility: AMBULARY SURGERY CENTER | Age: 79
End: 2024-08-21

## 2024-08-21 ENCOUNTER — ANESTHESIA (OUTPATIENT)
Dept: GASTROENTEROLOGY | Facility: AMBULARY SURGERY CENTER | Age: 79
End: 2024-08-21

## 2024-08-21 ENCOUNTER — HOSPITAL ENCOUNTER (OUTPATIENT)
Dept: GASTROENTEROLOGY | Facility: AMBULARY SURGERY CENTER | Age: 79
Setting detail: OUTPATIENT SURGERY
Discharge: HOME | End: 2024-08-21
Attending: INTERNAL MEDICINE
Payer: COMMERCIAL

## 2024-08-21 VITALS
OXYGEN SATURATION: 99 % | TEMPERATURE: 98.7 F | HEART RATE: 71 BPM | DIASTOLIC BLOOD PRESSURE: 59 MMHG | RESPIRATION RATE: 18 BRPM | SYSTOLIC BLOOD PRESSURE: 105 MMHG

## 2024-08-21 DIAGNOSIS — R19.8 CHANGE IN BOWEL FUNCTION: ICD-10-CM

## 2024-08-21 DIAGNOSIS — R63.4 WEIGHT LOSS, ABNORMAL: ICD-10-CM

## 2024-08-21 PROCEDURE — 88305 TISSUE EXAM BY PATHOLOGIST: CPT | Performed by: STUDENT IN AN ORGANIZED HEALTH CARE EDUCATION/TRAINING PROGRAM

## 2024-08-21 PROCEDURE — 45380 COLONOSCOPY AND BIOPSY: CPT | Performed by: INTERNAL MEDICINE

## 2024-08-21 RX ORDER — PROPOFOL 10 MG/ML
INJECTION, EMULSION INTRAVENOUS AS NEEDED
Status: DISCONTINUED | OUTPATIENT
Start: 2024-08-21 | End: 2024-08-21

## 2024-08-21 RX ORDER — SODIUM CHLORIDE, SODIUM LACTATE, POTASSIUM CHLORIDE, CALCIUM CHLORIDE 600; 310; 30; 20 MG/100ML; MG/100ML; MG/100ML; MG/100ML
125 INJECTION, SOLUTION INTRAVENOUS CONTINUOUS
Status: DISCONTINUED | OUTPATIENT
Start: 2024-08-21 | End: 2024-08-25 | Stop reason: HOSPADM

## 2024-08-21 RX ORDER — LIDOCAINE HYDROCHLORIDE 10 MG/ML
INJECTION, SOLUTION EPIDURAL; INFILTRATION; INTRACAUDAL; PERINEURAL AS NEEDED
Status: DISCONTINUED | OUTPATIENT
Start: 2024-08-21 | End: 2024-08-21

## 2024-08-21 RX ORDER — PROPOFOL 10 MG/ML
INJECTION, EMULSION INTRAVENOUS CONTINUOUS PRN
Status: DISCONTINUED | OUTPATIENT
Start: 2024-08-21 | End: 2024-08-21

## 2024-08-21 RX ADMIN — PROPOFOL 30 MG: 10 INJECTION, EMULSION INTRAVENOUS at 08:05

## 2024-08-21 RX ADMIN — PROPOFOL 100 MCG/KG/MIN: 10 INJECTION, EMULSION INTRAVENOUS at 08:00

## 2024-08-21 RX ADMIN — PROPOFOL 50 MG: 10 INJECTION, EMULSION INTRAVENOUS at 08:00

## 2024-08-21 RX ADMIN — SODIUM CHLORIDE, SODIUM LACTATE, POTASSIUM CHLORIDE, AND CALCIUM CHLORIDE: .6; .31; .03; .02 INJECTION, SOLUTION INTRAVENOUS at 07:42

## 2024-08-21 RX ADMIN — LIDOCAINE HYDROCHLORIDE 50 MG: 10 INJECTION, SOLUTION EPIDURAL; INFILTRATION; INTRACAUDAL; PERINEURAL at 08:00

## 2024-08-21 NOTE — ANESTHESIA PREPROCEDURE EVALUATION
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      Anesthesia Plan  ASA Score- 3     Anesthesia Type- IV sedation with anesthesia with ASA Monitors.         Additional Monitors:     Airway Plan:            Plan Factors-Exercise tolerance (METS): >4 METS.    Chart reviewed.   Existing labs reviewed. Patient summary reviewed.    Patient is not a current smoker.              Induction- intravenous.    Postoperative Plan-         Informed Consent- Anesthetic plan and risks discussed with patient.  I personally reviewed this patient with the CRNA. Discussed and agreed on the Anesthesia Plan with the CRNA..

## 2024-08-21 NOTE — INTERVAL H&P NOTE
H&P reviewed. After examining the patient I find no changes in the patients condition since the H&P had been written.    Vitals:    08/21/24 0715   BP: 170/74   Pulse: 79   Resp: 18   Temp: 98.7 °F (37.1 °C)   SpO2: 100%

## 2024-08-21 NOTE — ANESTHESIA POSTPROCEDURE EVALUATION
Post-Op Assessment Note    CV Status:  Stable  Pain Score: 0    Pain management: adequate       Mental Status:  Sleepy and arousable   Hydration Status:  Stable   PONV Controlled:  None   Airway Patency:  Patent and adequate     Post Op Vitals Reviewed: Yes    No anethesia notable event occurred.    Staff: CRNA               /53 (08/21/24 0820)    Temp      Pulse (!) 53 (08/21/24 0820)   Resp 18 (08/21/24 0820)    SpO2 99 % (08/21/24 0820)

## 2024-08-22 ENCOUNTER — APPOINTMENT (OUTPATIENT)
Dept: LAB | Facility: CLINIC | Age: 79
End: 2024-08-22
Payer: COMMERCIAL

## 2024-08-23 ENCOUNTER — APPOINTMENT (OUTPATIENT)
Dept: LAB | Facility: CLINIC | Age: 79
End: 2024-08-23
Payer: COMMERCIAL

## 2024-08-23 DIAGNOSIS — R63.4 WEIGHT LOSS, ABNORMAL: ICD-10-CM

## 2024-08-23 DIAGNOSIS — R19.8 CHANGE IN BOWEL FUNCTION: ICD-10-CM

## 2024-08-23 PROCEDURE — 83993 ASSAY FOR CALPROTECTIN FECAL: CPT

## 2024-08-24 LAB — CALPROTECTIN STL-MCNC: 51.2 ÂΜG/G

## 2024-08-26 PROCEDURE — 88305 TISSUE EXAM BY PATHOLOGIST: CPT | Performed by: STUDENT IN AN ORGANIZED HEALTH CARE EDUCATION/TRAINING PROGRAM

## 2024-10-01 ENCOUNTER — OFFICE VISIT (OUTPATIENT)
Dept: URGENT CARE | Facility: CLINIC | Age: 79
End: 2024-10-01
Payer: COMMERCIAL

## 2024-10-01 VITALS
SYSTOLIC BLOOD PRESSURE: 114 MMHG | BODY MASS INDEX: 18.07 KG/M2 | RESPIRATION RATE: 18 BRPM | WEIGHT: 102 LBS | HEIGHT: 63 IN | OXYGEN SATURATION: 97 % | TEMPERATURE: 97.8 F | DIASTOLIC BLOOD PRESSURE: 72 MMHG | HEART RATE: 82 BPM

## 2024-10-01 DIAGNOSIS — M10.9 ACUTE GOUT INVOLVING TOE OF RIGHT FOOT, UNSPECIFIED CAUSE: Primary | ICD-10-CM

## 2024-10-01 PROCEDURE — 99213 OFFICE O/P EST LOW 20 MIN: CPT

## 2024-10-01 RX ORDER — COLCHICINE 0.6 MG/1
1.2 TABLET ORAL ONCE
Qty: 3 TABLET | Refills: 0 | Status: SHIPPED | OUTPATIENT
Start: 2024-10-01 | End: 2024-10-01

## 2024-10-01 NOTE — PROGRESS NOTES
[facility]        NAME: Yoana Harris is a 79 y.o. female  : 1945    MRN: [id number]  DATE: 2024  TIME: 11:21 AM    Assessment and Plan   Acute gout involving toe of right foot, unspecified cause [M10.9]  1. Acute gout involving toe of right foot, unspecified cause  colchicine (COLCRYS) 0.6 mg tablet        Gout to R great toe, will treat with colchicine. Recommend PCP follow up     Patient Instructions     Colchicine as prescribed  Follow up with PCP in 3-5 days.  Proceed to  ER if symptoms worsen.    If tests are performed, our office will contact you with results only if changes need to made to the care plan discussed with you at the visit. You can [facility] full [facility] on [facility].    Chief Complaint     Chief Complaint   Patient presents with    Foot Pain     Pt states she is in pain when walking that started Friday. She has pain in her right foot.         History of Present Illness       Toe Pain   The incident occurred 3 to 5 days ago. The incident occurred at home. There was no injury mechanism. Pain location: R foot. The quality of the pain is described as stabbing (throbbing). The pain is severe. The pain has been Worsening since onset. Pertinent negatives include no inability to bear weight, loss of motion, loss of sensation, muscle weakness, numbness or tingling. She reports no foreign bodies present. The symptoms are aggravated by weight bearing and palpation. She has tried nothing for the symptoms.       Review of Systems   Review of Systems   Constitutional:  Negative for chills and fever.   HENT:  Negative for congestion, postnasal drip, rhinorrhea, sinus pressure, sore throat and trouble swallowing.    Respiratory:  Negative for cough, chest tightness and shortness of breath.    Cardiovascular:  Negative for chest pain and palpitations.   Gastrointestinal:  Negative for abdominal pain, nausea and vomiting.   Genitourinary:  Negative for difficulty  urinating.   Musculoskeletal:  Positive for arthralgias (R toe pain) and gait problem (secondary to pain). Negative for myalgias and neck stiffness.   Neurological:  Negative for dizziness, tingling, numbness and headaches.         Current Medications       Current Outpatient Medications:     atorvastatin (LIPITOR) 10 mg tablet, take 1 tablet by mouth every morning, Disp: 90 tablet, Rfl: 1    brinzolamide (AZOPT) 1 % ophthalmic suspension, Administer to the right eye 2 (two) times a day , Disp: , Rfl:     colchicine (COLCRYS) 0.6 mg tablet, Take 2 tablets (1.2 mg total) by mouth 1 (one) time for 1 dose Take 2 tablets and then 1 table an hour later, Disp: 3 tablet, Rfl: 0    famotidine (PEPCID) 40 MG tablet, take 1 tablet by mouth once daily, Disp: 60 tablet, Rfl: 5    levothyroxine 50 mcg tablet, take 1 tablet by mouth every morning, Disp: 90 tablet, Rfl: 1    travoprost (TRAVATAN-Z) 0.004 % ophthalmic solution, Apply 1 drop to eye daily at bedtime Both eyes, Disp: , Rfl:     fluocinolone acetonide (DERMOTIC) 0.01 % otic oil, Administer 3 drops into both ears 2 (two) times a day as needed (for itchy ears) (Patient not taking: Reported on 10/1/2024), Disp: 20 mL, Rfl: 11    Current Facility-Administered Medications:     Botulinum Toxin Type A SOLR 200 Units, 200 Units, Injection, Q3 Months, Manuela Rivero MD    Current Allergies     Allergies as of 10/01/2024    (No Known Allergies)            The following portions of the patient's history were reviewed and updated as appropriate: allergies, current medications, past family history, past medical history, past social history, past surgical history and problem list.     Past Medical History:   Diagnosis Date    Chronic kidney disease     Colon polyp     COVID-19 02/2021    cough, lost sense of taste for a few days    COVID-19 vaccine series completed     Diabetes mellitus (HCC)     Disease of thyroid gland     hypo    Ear problems approx. 2019    impacted wax  "   Essential tremor     hands and head    GERD (gastroesophageal reflux disease)     Glaucoma     both eyes    HL (hearing loss) around 2012    Hyperlipidemia     Osteoporosis     Persistent dry cough     d/t acid reflux    Pre-diabetes     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., 2021    Wears glasses     Wears hearing aid     darien. ears       Past Surgical History:   Procedure Laterality Date    CATARACT EXTRACTION Bilateral     COLONOSCOPY      EGD      EPIDURAL BLOCK INJECTION Right 6/2/2023    Procedure: L4 L5  TRANSFORAMINAL epidural steroid injection ([id number]);  Surgeon: Mony Silva MD;  Location: [facility] MAIN OR;  Service: Pain Management     UPPER GASTROINTESTINAL ENDOSCOPY         Family History   Problem Relation Age of Onset    Kidney disease Mother     Throat cancer Father     Diabetes Sister     Rheum arthritis Sister     Diabetes Brother         pre-diabetes    Rheum arthritis Brother     Diabetes Brother         DM    COPD Brother     Arthritis Brother     Diabetes Brother     Cancer Brother     Cancer Maternal Grandmother         breast    Diabetes Paternal Grandmother     Dystonia Neg Hx     Tremor Neg Hx          Medications have been verified.        Objective   /72   Pulse 82   Temp 97.8 °F (36.6 °C)   Resp 18   Ht 5' 3\" (1.6 m)   Wt 46.3 kg (102 lb)   SpO2 97%   BMI 18.07 kg/m²        Physical Exam     Physical Exam  Constitutional:       General: She is not in acute distress.  HENT:      Head: Normocephalic.      Nose: Nose normal.   Eyes:      Pupils: Pupils are equal, round, and reactive to light.   Cardiovascular:      Rate and Rhythm: Normal rate and regular rhythm.      Pulses: Normal pulses.      Heart sounds: Normal heart sounds.   Pulmonary:      Effort: Pulmonary effort is normal.      Breath sounds: Normal breath sounds.   Abdominal:      General: Abdomen is flat.   Musculoskeletal:         General: Normal range of motion.      Right foot: Normal range of motion. Swelling (mild with " redness to base of great toe) and tenderness (to any touch of base of great toe) present. No deformity, laceration or bony tenderness.      Left foot: Normal.   Skin:     General: Skin is warm and dry.      Capillary Refill: Capillary refill takes less than 2 seconds.   Neurological:      Mental Status: She is alert and oriented to person, place, and time.

## 2024-10-17 ENCOUNTER — OFFICE VISIT (OUTPATIENT)
Dept: AUDIOLOGY | Facility: CLINIC | Age: 79
End: 2024-10-17

## 2024-10-17 DIAGNOSIS — R91.1 LUNG NODULE: Primary | ICD-10-CM

## 2024-10-17 DIAGNOSIS — H90.3 SENSORINEURAL HEARING LOSS (SNHL), BILATERAL: Primary | ICD-10-CM

## 2024-10-17 NOTE — PROGRESS NOTES
Progress Note    Name:  Yoana Harris  :  1945  Age:  79 y.o.  MRN:  [id number]  Date of Evaluation: 10/17/24     Patient wanted to let me know that the medium closed domes were not as comfortable as the small power domes / she needed a few more power domes.  Provided.    Scheduled for 2024 with Safia Galdamez, CCC-A  Clinical Audiologist  [id number]  [phone]

## 2024-10-23 ENCOUNTER — RA CDI HCC (OUTPATIENT)
Dept: OTHER | Facility: HOSPITAL | Age: 79
End: 2024-10-23

## 2024-10-23 ENCOUNTER — APPOINTMENT (OUTPATIENT)
Dept: LAB | Facility: CLINIC | Age: 79
End: 2024-10-23
Payer: COMMERCIAL

## 2024-10-23 DIAGNOSIS — E03.9 HYPOTHYROIDISM, UNSPECIFIED TYPE: ICD-10-CM

## 2024-10-23 DIAGNOSIS — E87.1 HYPONATREMIA: ICD-10-CM

## 2024-10-23 DIAGNOSIS — E78.2 MIXED HYPERLIPIDEMIA: ICD-10-CM

## 2024-10-23 DIAGNOSIS — R73.09 ELEVATED GLUCOSE: ICD-10-CM

## 2024-10-23 DIAGNOSIS — N18.31 STAGE 3A CHRONIC KIDNEY DISEASE (HCC): ICD-10-CM

## 2024-10-23 LAB
ALBUMIN SERPL BCG-MCNC: 4.2 G/DL (ref 3.5–5)
ALP SERPL-CCNC: 75 U/L (ref 34–104)
ALT SERPL W P-5'-P-CCNC: 12 U/L (ref 7–52)
ANION GAP SERPL CALCULATED.3IONS-SCNC: 9 MMOL/L (ref 4–13)
AST SERPL W P-5'-P-CCNC: 18 U/L (ref 13–39)
BILIRUB SERPL-MCNC: 0.78 MG/DL (ref 0.2–1)
BUN SERPL-MCNC: 10 MG/DL (ref 5–25)
CALCIUM SERPL-MCNC: 9.5 MG/DL (ref 8.4–10.2)
CHLORIDE SERPL-SCNC: 102 MMOL/L (ref 96–108)
CHOLEST SERPL-MCNC: 163 MG/DL
CO2 SERPL-SCNC: 26 MMOL/L (ref 21–32)
CREAT SERPL-MCNC: 1.08 MG/DL (ref 0.6–1.3)
ERYTHROCYTE [DISTWIDTH] IN BLOOD BY AUTOMATED COUNT: 12.3 % (ref 11.6–15.1)
EST. AVERAGE GLUCOSE BLD GHB EST-MCNC: 128 MG/DL
GFR SERPL CREATININE-BSD FRML MDRD: 48 ML/MIN/1.73SQ M
GLUCOSE P FAST SERPL-MCNC: 96 MG/DL (ref 65–99)
HBA1C MFR BLD: 6.1 %
HCT VFR BLD AUTO: 36.9 % (ref 34.8–46.1)
HDLC SERPL-MCNC: 55 MG/DL
HGB BLD-MCNC: 12.1 G/DL (ref 11.5–15.4)
LDLC SERPL CALC-MCNC: 87 MG/DL (ref 0–100)
MCH RBC QN AUTO: 30.9 PG (ref 26.8–34.3)
MCHC RBC AUTO-ENTMCNC: 32.8 G/DL (ref 31.4–37.4)
MCV RBC AUTO: 94 FL (ref 82–98)
NONHDLC SERPL-MCNC: 108 MG/DL
PLATELET # BLD AUTO: 269 THOUSANDS/UL (ref 149–390)
PMV BLD AUTO: 9.9 FL (ref 8.9–12.7)
POTASSIUM SERPL-SCNC: 4.2 MMOL/L (ref 3.5–5.3)
PROT SERPL-MCNC: 7.4 G/DL (ref 6.4–8.4)
RBC # BLD AUTO: 3.91 MILLION/UL (ref 3.81–5.12)
SODIUM SERPL-SCNC: 137 MMOL/L (ref 135–147)
TRIGL SERPL-MCNC: 104 MG/DL
TSH SERPL DL<=0.05 MIU/L-ACNC: 2.02 UIU/ML (ref 0.45–4.5)
WBC # BLD AUTO: 6.3 THOUSAND/UL (ref 4.31–10.16)

## 2024-10-23 PROCEDURE — 84443 ASSAY THYROID STIM HORMONE: CPT

## 2024-10-23 PROCEDURE — 80053 COMPREHEN METABOLIC PANEL: CPT

## 2024-10-23 PROCEDURE — 83036 HEMOGLOBIN GLYCOSYLATED A1C: CPT

## 2024-10-23 PROCEDURE — 80061 LIPID PANEL: CPT

## 2024-10-23 PROCEDURE — 36415 COLL VENOUS BLD VENIPUNCTURE: CPT

## 2024-10-23 PROCEDURE — 85027 COMPLETE CBC AUTOMATED: CPT

## 2024-10-30 ENCOUNTER — OFFICE VISIT (OUTPATIENT)
Dept: FAMILY MEDICINE CLINIC | Facility: CLINIC | Age: 79
End: 2024-10-30
Payer: COMMERCIAL

## 2024-10-30 VITALS
HEIGHT: 63 IN | SYSTOLIC BLOOD PRESSURE: 128 MMHG | HEART RATE: 80 BPM | BODY MASS INDEX: 18.18 KG/M2 | DIASTOLIC BLOOD PRESSURE: 70 MMHG | RESPIRATION RATE: 16 BRPM | TEMPERATURE: 97.6 F | WEIGHT: 102.6 LBS

## 2024-10-30 DIAGNOSIS — E87.1 HYPONATREMIA: ICD-10-CM

## 2024-10-30 DIAGNOSIS — R73.09 ELEVATED GLUCOSE: ICD-10-CM

## 2024-10-30 DIAGNOSIS — E78.2 MIXED HYPERLIPIDEMIA: ICD-10-CM

## 2024-10-30 DIAGNOSIS — Z00.00 MEDICARE ANNUAL WELLNESS VISIT, SUBSEQUENT: Primary | ICD-10-CM

## 2024-10-30 DIAGNOSIS — E03.9 HYPOTHYROIDISM, UNSPECIFIED TYPE: ICD-10-CM

## 2024-10-30 PROCEDURE — G0439 PPPS, SUBSEQ VISIT: HCPCS | Performed by: INTERNAL MEDICINE

## 2024-10-30 PROCEDURE — 99214 OFFICE O/P EST MOD 30 MIN: CPT | Performed by: INTERNAL MEDICINE

## 2024-10-30 NOTE — ASSESSMENT & PLAN NOTE
Reviewed labs with patient, this is still well controlled on atorvastatin and will continue as ordered.   Orders:    CBC; Future    Comprehensive metabolic panel; Future    Lipid panel; Future    Hemoglobin A1C; Future    TSH, 3rd generation with Free T4 reflex; Future

## 2024-10-30 NOTE — ASSESSMENT & PLAN NOTE
Improving and will continue to monitor.  Continue exercise and low carb diet.   Orders:    CBC; Future    Comprehensive metabolic panel; Future    Lipid panel; Future    Hemoglobin A1C; Future    TSH, 3rd generation with Free T4 reflex; Future

## 2024-10-30 NOTE — ASSESSMENT & PLAN NOTE
Clinically euthyroid and TSH is normal.  Continue levothyroxine as ordered.   Orders:    CBC; Future    Comprehensive metabolic panel; Future    Lipid panel; Future    Hemoglobin A1C; Future    TSH, 3rd generation with Free T4 reflex; Future

## 2024-10-30 NOTE — PROGRESS NOTES
Ambulatory Visit  Name: Yoana Harris      : 1945      MRN: [id number]  Encounter Provider: Morenita Todd MD  Encounter Date: 10/30/2024   Encounter department: [facility]    Assessment & Plan  Medicare annual wellness visit, subsequent         Mixed hyperlipidemia  Reviewed labs with patient, this is still well controlled on atorvastatin and will continue as ordered.   Orders:    CBC; Future    Comprehensive metabolic panel; Future    Lipid panel; Future    Hemoglobin A1C; Future    TSH, 3rd generation with Free T4 reflex; Future    Hypothyroidism, unspecified type  Clinically euthyroid and TSH is normal.  Continue levothyroxine as ordered.   Orders:    CBC; Future    Comprehensive metabolic panel; Future    Lipid panel; Future    Hemoglobin A1C; Future    TSH, 3rd generation with Free T4 reflex; Future    Hyponatremia  Resolved.   Orders:    CBC; Future    Comprehensive metabolic panel; Future    Lipid panel; Future    Hemoglobin A1C; Future    TSH, 3rd generation with Free T4 reflex; Future    Elevated glucose  Improving and will continue to monitor.  Continue exercise and low carb diet.   Orders:    CBC; Future    Comprehensive metabolic panel; Future    Lipid panel; Future    Hemoglobin A1C; Future    TSH, 3rd generation with Free T4 reflex; Future       Preventive health issues were discussed with patient, and age appropriate screening tests were ordered as noted in patient's After Visit Summary. Personalized health advice and appropriate referrals for health education or preventive services given if needed, as noted in patient's After Visit Summary.    History of Present Illness     Here for AWV and follow up.  She has been seeing GI for loose BM's and colonoscopy and biopsies are normal.  She tried immodium but it stopped her up too much. She does have some fatigue.         Patient Care Team:  Morenita Todd MD as PCP - General  Vadim Gamboa MD    Review of Systems    Constitutional:  Negative for chills and fever.   HENT:  Negative for ear pain and sore throat.    Eyes:  Negative for pain and visual disturbance.   Respiratory:  Negative for cough and shortness of breath.    Cardiovascular:  Negative for chest pain and palpitations.   Gastrointestinal:  Negative for abdominal pain and vomiting.   Genitourinary:  Negative for dysuria and hematuria.   Musculoskeletal:  Negative for arthralgias and back pain.   Skin:  Negative for color change and rash.   Neurological:  Negative for seizures and syncope.   All other systems reviewed and are negative.    Medical History Reviewed by provider this encounter:       Annual Wellness Visit Questionnaire   Yoana is here for her Subsequent Wellness visit.     Health Risk Assessment:   Patient rates overall health as good. Patient feels that their physical health rating is slightly worse. Patient is satisfied with their life. Eyesight was rated as same. Hearing was rated as same. Patient feels that their emotional and mental health rating is same. Patients states they are never, rarely angry. Patient states they are often unusually tired/fatigued. Pain experienced in the last 7 days has been none. Patient states that she has experienced no weight loss or gain in last 6 months.     Fall Risk Screening:   In the past year, patient has experienced: no history of falling in past year      Urinary Incontinence Screening:   Patient has not leaked urine accidently in the last six months.     Home Safety:  Patient does not have trouble with stairs inside or outside of their home. Patient has working smoke alarms and has working carbon monoxide detector. Home safety hazards include: none.     Nutrition:   Current diet is Low Carb and Limited junk food.     Medications:   Patient is not currently taking any over-the-counter supplements. Patient is able to manage medications.     Activities of Daily Living (ADLs)/Instrumental Activities of Daily  Living (IADLs):   Walk and transfer into and out of bed and chair?: Yes  Dress and groom yourself?: Yes    Bathe or shower yourself?: Yes    Feed yourself? Yes  Do your laundry/housekeeping?: Yes  Manage your money, pay your bills and track your expenses?: Yes  Make your own meals?: Yes    Do your own shopping?: Yes    Previous Hospitalizations:   Any hospitalizations or ED visits within the last 12 months?: No      Advance Care Planning:   Living will: Yes    Durable POA for healthcare: Yes    Advanced directive: Yes    End of Life Decisions reviewed with patient: Yes      Cognitive Screening:   Provider or family/friend/caregiver concerned regarding cognition?: No    PREVENTIVE SCREENINGS      Cardiovascular Screening:    General: Screening Not Indicated and History Lipid Disorder      Diabetes Screening:     General: Screening Current      Colorectal Cancer Screening:     General: Screening Not Indicated      Breast Cancer Screening:     General: Screening Not Indicated      Cervical Cancer Screening:    General: Screening Not Indicated      Osteoporosis Screening:    General: Screening Not Indicated and History Osteoporosis      Abdominal Aortic Aneurysm (AAA) Screening:        General: Screening Not Indicated      Lung Cancer Screening:     General: Screening Not Indicated      Hepatitis C Screening:    General: Screening Not Indicated    Screening, Brief Intervention, and Referral to Treatment (SBIRT)    Screening  Typical number of drinks in a day: 1  Typical number of drinks in a week: 6  Interpretation: Low risk drinking behavior.    AUDIT-C Screenin) How often did you have a drink containing alcohol in the past year? 4 or more times a week  2) How many drinks did you have on a typical day when you were drinking in the past year? 1 to 2  3) How often did you have 6 or more drinks on one occasion in the past year? never    AUDIT-C Score: 4  Interpretation: Score 3-12 (female): POSITIVE screen for  alcohol misuse    AUDIT Screenin) How often during the last year have you found that you were not able to stop drinking once you had started? 0 - never  5) How often during the last year have you failed to do what was normally expected from you because of drinking? 0 - never  6) How often during the last year have you needed a first drink in the morning to get yourself going after a heavy drinking session? 0 - never  7) How often during the last year have you had a feeling of guilt or remorse after drinking? 0 - never  8) How often during the last year have you been unable to remember what happened the night before because you had been drinking? 0 - never  9) Have you or someone else been injured as a result of your drinking? 0 - no  10) Has a relative or friend or a doctor or another health worker been concerned about your drinking or suggested you cut down? 0 - no    AUDIT Score: 4  Interpretation: Low risk alcohol consumption    Single Item Drug Screening:  How often have you used an illegal drug (including marijuana) or a prescription medication for non-medical reasons in the past year? never    Single Item Drug Screen Score: 0  Interpretation: Negative screen for possible drug use disorder    Brief Intervention  Alcohol & drug use screenings were reviewed. No concerns regarding substance use disorder identified. Healthy alcohol use/limits discussed.     Other Counseling Topics:   Car/seat belt/driving safety, skin self-exam, sunscreen and calcium and vitamin D intake and regular weightbearing exercise.     Social Determinants of Health     Financial Resource Strain: Low Risk  (10/11/2023)    Overall Financial Resource Strain (CARDIA)     Difficulty of Paying Living Expenses: Not hard at all   Food Insecurity: No Food Insecurity (10/25/2024)    Hunger Vital Sign     Worried About Running Out of Food in the Last Year: Never true     Ran Out of Food in the Last Year: Never true   Transportation Needs: No  Transportation Needs (10/25/2024)    PRAPARE - Transportation     Lack of Transportation (Medical): No     Lack of Transportation (Non-Medical): No   Housing Stability: Low Risk  (10/25/2024)    Housing Stability Vital Sign     Unable to Pay for Housing in the Last Year: No     Number of Times Moved in the Last Year: 0     Homeless in the Last Year: No   Utilities: Not At Risk (10/25/2024)    [facility] Utilities     Threatened with loss of utilities: No     No results found.    Objective     There were no vitals taken for this visit.    Physical Exam  Constitutional:       General: She is not in acute distress.     Appearance: She is well-developed. She is not diaphoretic.   HENT:      Head: Normocephalic and atraumatic.      Right Ear: External ear normal.      Left Ear: External ear normal.      Nose: Nose normal.   Eyes:      Pupils: Pupils are equal, round, and reactive to light.   Neck:      Thyroid: No thyromegaly.      Vascular: No JVD.   Cardiovascular:      Rate and Rhythm: Normal rate and regular rhythm.      Heart sounds: No murmur heard.     No friction rub. No gallop.   Pulmonary:      Effort: Pulmonary effort is normal.      Breath sounds: Normal breath sounds. No wheezing or rales.   Abdominal:      General: Bowel sounds are normal. There is no distension.      Palpations: Abdomen is soft.      Tenderness: There is no abdominal tenderness.   Musculoskeletal:         General: Normal range of motion.      Cervical back: Normal range of motion and neck supple.   Skin:     General: Skin is warm and dry.      Findings: No rash.   Neurological:      Mental Status: She is alert and oriented to person, place, and time.      Cranial Nerves: No cranial nerve deficit.      Sensory: No sensory deficit.      Motor: No abnormal muscle tone.      Coordination: Coordination normal.      Deep Tendon Reflexes: Reflexes normal.   Psychiatric:         Behavior: Behavior normal.         Thought Content: Thought content normal.          Judgment: Judgment normal.

## 2024-10-30 NOTE — PATIENT INSTRUCTIONS
Medicare Preventive Visit Patient Instructions  Thank you for completing your Welcome to Medicare Visit or Medicare Annual Wellness Visit today. Your next wellness visit will be due in one year (10/31/2025).  The screening/preventive services that you may require over the next 5-10 years are detailed below. Some tests may not apply to you based off risk factors and/or age. Screening tests ordered at today's visit but not completed yet may show as past due. Also, please note that scanned in results may not display below.  Preventive Screenings:  Service Recommendations Previous Testing/Comments   Colorectal Cancer Screening  * Colonoscopy    * Fecal Occult Blood Test (FOBT)/Fecal Immunochemical Test (FIT)  * Fecal DNA/Cologuard Test  * Flexible Sigmoidoscopy Age: 45-75 years old   Colonoscopy: every 10 years (may be performed more frequently if at higher risk)  OR  FOBT/FIT: every 1 year  OR  Cologuard: every 3 years  OR  Sigmoidoscopy: every 5 years  Screening may be recommended earlier than age 45 if at higher risk for colorectal cancer. Also, an individualized decision between you and your healthcare provider will decide whether screening between the ages of 76-85 would be appropriate. Colonoscopy: 08/21/2024  FOBT/FIT: Not on file  Cologuard: Not on file  Sigmoidoscopy: Not on file          Breast Cancer Screening Age: 40+ years old  Frequency: every 1-2 years  Not required if history of left and right mastectomy Mammogram: 11/08/2018        Cervical Cancer Screening Between the ages of 21-29, pap smear recommended once every 3 years.   Between the ages of 30-65, can perform pap smear with HPV co-testing every 5 years.   Recommendations may differ for women with a history of total hysterectomy, cervical cancer, or abnormal pap smears in past. Pap Smear: Not on file        Hepatitis C Screening Once for adults born between 1945 and 1965  More frequently in patients at high risk for Hepatitis C Hep C Antibody: Not  on file        Diabetes Screening 1-2 times per year if you're at risk for diabetes or have pre-diabetes Fasting glucose: 96 mg/dL (10/23/2024)  A1C: 6.1 % (10/23/2024)      Cholesterol Screening Once every 5 years if you don't have a lipid disorder. May order more often based on risk factors. Lipid panel: 10/23/2024          Other Preventive Screenings Covered by Medicare:  Abdominal Aortic Aneurysm (AAA) Screening: covered once if your at risk. You're considered to be at risk if you have a family history of AAA.  Lung Cancer Screening: covers low dose CT scan once per year if you meet all of the following conditions: (1) Age 55-77; (2) No signs or symptoms of lung cancer; (3) Current smoker or have quit smoking within the last 15 years; (4) You have a tobacco smoking history of at least 20 pack years (packs per day multiplied by number of years you smoked); (5) You get a written order from a healthcare provider.  Glaucoma Screening: covered annually if you're considered high risk: (1) You have diabetes OR (2) Family history of glaucoma OR (3)  aged 50 and older OR (4)  American aged 65 and older  Osteoporosis Screening: covered every 2 years if you meet one of the following conditions: (1) You're estrogen deficient and at risk for osteoporosis based off medical history and other findings; (2) Have a vertebral abnormality; (3) On glucocorticoid therapy for more than 3 months; (4) Have primary hyperparathyroidism; (5) On osteoporosis medications and need to assess response to drug therapy.   Last bone density test (DXA Scan): Not on file.  HIV Screening: covered annually if you're between the age of 15-65. Also covered annually if you are younger than 15 and older than 65 with risk factors for HIV infection. For pregnant patients, it is covered up to 3 times per pregnancy.    Immunizations:  Immunization Recommendations   Influenza Vaccine Annual influenza vaccination during flu season is  recommended for all persons aged >= 6 months who do not have contraindications   Pneumococcal Vaccine   * Pneumococcal conjugate vaccine = PCV13 (Prevnar 13), PCV15 (Vaxneuvance), PCV20 (Prevnar 20)  * Pneumococcal polysaccharide vaccine = PPSV23 (Pneumovax) Adults 19-65 yo with certain risk factors or if 65+ yo  If never received any pneumonia vaccine: recommend Prevnar 20 (PCV20)  Give PCV20 if previously received 1 dose of PCV13 or PPSV23   Hepatitis B Vaccine 3 dose series if at intermediate or high risk (ex: diabetes, end stage renal disease, liver disease)   Respiratory syncytial virus (RSV) Vaccine - COVERED BY MEDICARE PART D  * RSVPreF3 (Arexvy) CDC recommends that adults 60 years of age and older may receive a single dose of RSV vaccine using shared clinical decision-making (SCDM)   Tetanus (Td) Vaccine - COST NOT COVERED BY MEDICARE PART B Following completion of primary series, a booster dose should be given every 10 years to maintain immunity against tetanus. Td may also be given as tetanus wound prophylaxis.   Tdap Vaccine - COST NOT COVERED BY MEDICARE PART B Recommended at least once for all adults. For pregnant patients, recommended with each pregnancy.   Shingles Vaccine (Shingrix) - COST NOT COVERED BY MEDICARE PART B  2 shot series recommended in those 19 years and older who have or will have weakened immune systems or those 50 years and older     Health Maintenance Due:  There are no preventive care reminders to display for this patient.  Immunizations Due:  There are no preventive care reminders to display for this patient.  Advance Directives   What are advance directives?  Advance directives are legal documents that state your wishes and plans for medical care. These plans are made ahead of time in case you lose your ability to make decisions for yourself. Advance directives can apply to any medical decision, such as the treatments you want, and if you want to donate organs.   What are the  types of advance directives?  There are many types of advance directives, and each state has rules about how to use them. You may choose a combination of any of the following:  Living will:  This is a written record of the treatment you want. You can also choose which treatments you do not want, which to limit, and which to stop at a certain time. This includes surgery, medicine, IV fluid, and tube feedings.   Durable power of  for healthcare (DPAHC):  This is a written record that states who you want to make healthcare choices for you when you are unable to make them for yourself. This person, called a proxy, is usually a family member or a friend. You may choose more than 1 proxy.  Do not resuscitate (DNR) order:  A DNR order is used in case your heart stops beating or you stop breathing. It is a request not to have certain forms of treatment, such as CPR. A DNR order may be included in other types of advance directives.  Medical directive:  This covers the care that you want if you are in a coma, near death, or unable to make decisions for yourself. You can list the treatments you want for each condition. Treatment may include pain medicine, surgery, blood transfusions, dialysis, IV or tube feedings, and a ventilator (breathing machine).  Values history:  This document has questions about your views, beliefs, and how you feel and think about life. This information can help others choose the care that you would choose.  Why are advance directives important?  An advance directive helps you control your care. Although spoken wishes may be used, it is better to have your wishes written down. Spoken wishes can be misunderstood, or not followed. Treatments may be given even if you do not want them. An advance directive may make it easier for your family to make difficult choices about your care.   Underweight  Underweight is defined as having a body mass index (BMI) of less than 18.5 kg/m2   Anorexia  is a loss  of appetite, decreased food intake, or both. Your appetite naturally decreases as you get older. You also get full faster than you used to. This occurs because your body needs less energy. Other body changes can also lead to a decreased appetite. Even though some appetite loss is normal, you still need to get enough calories and nutrients to keep you healthy. You can start to lose too much weight if you do not eat as much food as your body needs. Unwanted weight loss can cause health problems, or worsen health problems you already have. You can also become dehydrated if you do not drink enough liquid.  How to eat healthy and get enough nutrients:   Choose healthy foods.  Eat a variety of fruits, vegetables, whole grains, low-fat dairy foods, lean meats, and other protein foods. Limit foods high in fat, sugar, and salt. Limit or avoid alcohol as directed. Work with a dietitian to help you plan your meals if you need to follow a special diet. A dietitian can also teach you how to modify foods if you have trouble chewing or swallowing.   Snack on healthy foods between meals  if you only eat a small amount during meals. Snacks provide extra healthy nutrients and calories between meals. Examples include fruit, cheese, and whole grain crackers.   Drink liquids as directed  to avoid dehydration. Drink liquids between meals if they cause you to get full too quickly during meals. Ask how much liquid to drink each day and which liquids are best for you.   Use herbs, spices, and flavor enhancers to add flavor to foods.  Avoid using herbs and spice blends that also contain sodium. Ask your healthcare provider or dietitian about flavor enhancers. Flavor enhancers with ham, natural fitzpatrick, and roast beef flavors can also be sprinkled on food to add flavor.   Share meals with others as often as you can.  Eating with others may help you to eat better during meal time. Ask family members, neighbors, or friends to join you for lunch.  There are also senior centers where you can meet people, and share meals with them.   Ask family and friends for help  with shopping or preparing foods. Ask for a ride to the grocery store, if needed.       © Copyright [employer] 2018 Information is for End User's use only and may not be sold, redistributed or otherwise used for commercial purposes. All illustrations and images included in CareNotes® are the copyrighted property of A.D.A.M., Inc. or [employer]

## 2024-10-30 NOTE — ASSESSMENT & PLAN NOTE
Resolved.   Orders:    CBC; Future    Comprehensive metabolic panel; Future    Lipid panel; Future    Hemoglobin A1C; Future    TSH, 3rd generation with Free T4 reflex; Future

## 2024-11-02 DIAGNOSIS — K22.70 BARRETT'S ESOPHAGUS WITHOUT DYSPLASIA: ICD-10-CM

## 2024-11-02 DIAGNOSIS — R05.3 CHRONIC COUGH: ICD-10-CM

## 2024-11-02 DIAGNOSIS — K21.00 GASTROESOPHAGEAL REFLUX DISEASE WITH ESOPHAGITIS WITHOUT HEMORRHAGE: ICD-10-CM

## 2024-11-04 RX ORDER — FAMOTIDINE 40 MG/1
TABLET, FILM COATED ORAL
Qty: 90 TABLET | Refills: 1 | Status: SHIPPED | OUTPATIENT
Start: 2024-11-04

## 2024-11-25 ENCOUNTER — OFFICE VISIT (OUTPATIENT)
Dept: AUDIOLOGY | Facility: CLINIC | Age: 79
End: 2024-11-25

## 2024-11-25 DIAGNOSIS — H90.3 SENSORINEURAL HEARING LOSS (SNHL), BILATERAL: Primary | ICD-10-CM

## 2024-11-25 NOTE — PROGRESS NOTES
Hearing Aid Visit:    Name:  Yoana Harris  :  1945  Age:  79 y.o.  MRN:  [id number]  Date of Evaluation: 24     HISTORY:    Yoana Harris was seen today (2024) for a(n) in-warranty hearing aid check of her bilateral hearing aids. Today, Yoana reports the hearing aids are working well. She shares changing the filters and domes are difficult for her    DEVICE INFORMATION:  Hearing Aid Fitting Date: 5/15/2023          Left Device Right Device   Hearing Aid Make: Phonak Phonak   Hearing Aid Model: Slim L70-R Slim L70-R   Serial Number: [id number]   Repair Warranty Expiration Date: 26   Loss/Damage Warranty Expiration Date:  26    Length: # 2  added lock  # 2  added lock     Output: M M   Wax System: Gusto   Dome Size/Style: Small power Small power   Battery: Lithium-ion Rechargeable Lithium-ion Rechargeable   [employer] Serial Number: does not use [id number]   [employer]old Warranty Date:  23       Serial Number: [id number]  Accessories: N/A      ACTION/ADJUSTMENTS:    Hearing aids cleaned and checked. Domes and wax filters changed and microphones brushed  Placed in ReDux dryer and 0.5 uL of moisture was removed  No firmware updates or hearing aid programming was needed today    RECOMMENDATIONS:     Return for routine hearing aid maintenance in 3 months (24) so we can change filters for her, or sooner if problems or concerns arise      Liu Ratliff., CCC-A  Clinical Audiologist  [facility] AUDIOLOGY  [address] [facility] [id number]

## 2024-12-05 ENCOUNTER — OFFICE VISIT (OUTPATIENT)
Dept: OTOLARYNGOLOGY | Facility: CLINIC | Age: 79
End: 2024-12-05
Payer: COMMERCIAL

## 2024-12-05 VITALS — HEIGHT: 63 IN | BODY MASS INDEX: 18.07 KG/M2 | TEMPERATURE: 97.4 F | WEIGHT: 102 LBS

## 2024-12-05 DIAGNOSIS — H61.23 BILATERAL IMPACTED CERUMEN: Primary | ICD-10-CM

## 2024-12-05 PROCEDURE — 69210 REMOVE IMPACTED EAR WAX UNI: CPT | Performed by: OTOLARYNGOLOGY

## 2024-12-05 PROCEDURE — 99213 OFFICE O/P EST LOW 20 MIN: CPT | Performed by: OTOLARYNGOLOGY

## 2024-12-05 NOTE — PROGRESS NOTES
"Assessment/Plan:  Impacted cerumen removed.  F/u in 6 months.      Diagnosis ICD-10-CM Associated Orders   1. Bilateral impacted cerumen  H61.23              Subjective:      Patient ID: Yoana Harris is a 79 y.o. female.    F/u for cerumen impaction.        The following portions of the patient's history were reviewed and updated as appropriate: allergies, current medications, past family history, past medical history, past social history, past surgical history and problem list.    Review of Systems      Objective:      Temp (!) 97.4 °F (36.3 °C) (Temporal)   Ht 5' 3\" (1.6 m)   Wt 46.3 kg (102 lb)   BMI 18.07 kg/m²          Physical Exam  HENT:      Right Ear: Tympanic membrane, ear canal and external ear normal. There is impacted cerumen.      Left Ear: Tympanic membrane, ear canal and external ear normal. There is impacted cerumen.         PROCEDURE: Cerumen removal from ears  Impacted cerumen removal was performed by Dr. Dodson and removed from both ears.   The visualization instrument used was the operating otoscope and speculum. The ear cleaning instrument used was suction. The outcome was successful and the patient tolerated the procedure well. There were no complications.  "

## 2024-12-08 NOTE — PATIENT INSTRUCTIONS
You can substitute some G2 GATORADE for some of the water during the day to stay hydrated and help your sodium.   Preventive measure

## 2024-12-22 DIAGNOSIS — E78.2 MIXED HYPERLIPIDEMIA: ICD-10-CM

## 2024-12-23 ENCOUNTER — TELEPHONE (OUTPATIENT)
Dept: OTOLARYNGOLOGY | Facility: CLINIC | Age: 79
End: 2024-12-23

## 2024-12-24 RX ORDER — ATORVASTATIN CALCIUM 10 MG/1
10 TABLET, FILM COATED ORAL EVERY MORNING
Qty: 90 TABLET | Refills: 1 | Status: SHIPPED | OUTPATIENT
Start: 2024-12-24

## 2025-01-29 ENCOUNTER — TELEPHONE (OUTPATIENT)
Age: 80
End: 2025-01-29

## 2025-01-29 ENCOUNTER — OFFICE VISIT (OUTPATIENT)
Age: 80
End: 2025-01-29
Payer: COMMERCIAL

## 2025-01-29 VITALS — WEIGHT: 103.4 LBS | HEIGHT: 64 IN | BODY MASS INDEX: 17.65 KG/M2

## 2025-01-29 DIAGNOSIS — K21.00 GASTROESOPHAGEAL REFLUX DISEASE WITH ESOPHAGITIS WITHOUT HEMORRHAGE: Primary | ICD-10-CM

## 2025-01-29 DIAGNOSIS — R05.3 CHRONIC COUGH: ICD-10-CM

## 2025-01-29 DIAGNOSIS — K22.70 BARRETT'S ESOPHAGUS WITHOUT DYSPLASIA: ICD-10-CM

## 2025-01-29 DIAGNOSIS — E03.9 HYPOTHYROIDISM, UNSPECIFIED TYPE: ICD-10-CM

## 2025-01-29 PROCEDURE — 99214 OFFICE O/P EST MOD 30 MIN: CPT | Performed by: NURSE PRACTITIONER

## 2025-01-29 RX ORDER — LEVOTHYROXINE SODIUM 50 UG/1
50 TABLET ORAL EVERY MORNING
Qty: 90 TABLET | Refills: 1 | Status: SHIPPED | OUTPATIENT
Start: 2025-01-29

## 2025-01-29 NOTE — H&P (VIEW-ONLY)
Name: Yoana Harris      : 1945      MRN: [id number]  Encounter Provider: KINDRA Sainz  Encounter Date: 2025   Encounter department: [facility]  :  Assessment & Plan  Gastroesophageal reflux disease with esophagitis without hemorrhage  Last EGD 2021 with irregular z line, antral erythema. LE with chronic inlammatory changes; no watson's identified at that time. Chronic inactive gastritis negative for H.pylori  Avoid dietary triggers  Continue Pepcid 40mg daily  Orders:    EGD; Future    Watson's esophagus without dysplasia  Overdue for surveillance, EGD scheduled.  Orders:    EGD; Future    Chronic cough  Stopped PPI due to potential side effects, didn't note any difference with Pepcid BID vs daily so continues on daily dosing. Has seen ENT, Pulm.   Orders:    EGD; Future        History of Present Illness   HPI  Yoana Harris is a 79 y.o. female with history of diabetes, hyperlipidemia, CKD, GERD, Watson's esophagus, colon polyps who presents for follow-up.  She is due for EGD for Watson's surveillance.  She denies any heartburn, reflux, regurgitation, abdominal pain, nausea or vomiting.  She does have a chronic cough and this persisted despite PPI and she developed side effects to omeprazole so she stopped this, and is taking Pepcid daily.  She tried taking this twice daily in the past but again had no improvement in her cough, so she decreased back to daily dosing.  She reports her bowel movements have improved and she is only moving her bowels 1-2 times a day with loose stool.  She tried a fiber supplement effective for her.  Colonoscopy with random colon biopsies, fecal elastase, O&P, C. difficile, enteric panel were negative.  Fecal calprotectin elevated at first but then resolved.     Review of Systems   HENT:  Negative for trouble swallowing.    Respiratory:  Positive for cough.    Gastrointestinal:  Positive for diarrhea. Negative for  "abdominal distention, abdominal pain, anal bleeding, blood in stool, constipation, nausea, rectal pain and vomiting.          Objective   Ht 5' 4\" (1.626 m)   Wt 46.9 kg (103 lb 6.4 oz)   BMI 17.75 kg/m²      Physical Exam      "

## 2025-01-29 NOTE — TELEPHONE ENCOUNTER
Scheduled date of EGD(as of today):fEB. 3, 2025  Physician performing EGD:Dr. Pina  Location of EGD:[facility]  Instructions reviewed with patient by:jacki  Clearances: na

## 2025-01-29 NOTE — ASSESSMENT & PLAN NOTE
Last EGD 12/2021 with irregular z line, antral erythema. LE with chronic inlammatory changes; no watson's identified at that time. Chronic inactive gastritis negative for H.pylori  Avoid dietary triggers  Continue Pepcid 40mg daily  Orders:    EGD; Future

## 2025-01-29 NOTE — PROGRESS NOTES
Name: Yoana Harris      : 1945      MRN: [id number]  Encounter Provider: KINDRA Sainz  Encounter Date: 2025   Encounter department: [facility]  :  Assessment & Plan  Gastroesophageal reflux disease with esophagitis without hemorrhage  Last EGD 2021 with irregular z line, antral erythema. LE with chronic inlammatory changes; no watson's identified at that time. Chronic inactive gastritis negative for H.pylori  Avoid dietary triggers  Continue Pepcid 40mg daily  Orders:    EGD; Future    Watson's esophagus without dysplasia  Overdue for surveillance, EGD scheduled.  Orders:    EGD; Future    Chronic cough  Stopped PPI due to potential side effects, didn't note any difference with Pepcid BID vs daily so continues on daily dosing. Has seen ENT, Pulm.   Orders:    EGD; Future        History of Present Illness   HPI  Yoana Harris is a 79 y.o. female with history of diabetes, hyperlipidemia, CKD, GERD, Watson's esophagus, colon polyps who presents for follow-up.  She is due for EGD for Watson's surveillance.  She denies any heartburn, reflux, regurgitation, abdominal pain, nausea or vomiting.  She does have a chronic cough and this persisted despite PPI and she developed side effects to omeprazole so she stopped this, and is taking Pepcid daily.  She tried taking this twice daily in the past but again had no improvement in her cough, so she decreased back to daily dosing.  She reports her bowel movements have improved and she is only moving her bowels 1-2 times a day with loose stool.  She tried a fiber supplement effective for her.  Colonoscopy with random colon biopsies, fecal elastase, O&P, C. difficile, enteric panel were negative.  Fecal calprotectin elevated at first but then resolved.     Review of Systems   HENT:  Negative for trouble swallowing.    Respiratory:  Positive for cough.    Gastrointestinal:  Positive for diarrhea. Negative for  "abdominal distention, abdominal pain, anal bleeding, blood in stool, constipation, nausea, rectal pain and vomiting.          Objective   Ht 5' 4\" (1.626 m)   Wt 46.9 kg (103 lb 6.4 oz)   BMI 17.75 kg/m²      Physical Exam      "

## 2025-01-31 ENCOUNTER — ANESTHESIA (OUTPATIENT)
Dept: ANESTHESIOLOGY | Facility: HOSPITAL | Age: 80
End: 2025-01-31

## 2025-01-31 ENCOUNTER — ANESTHESIA EVENT (OUTPATIENT)
Dept: ANESTHESIOLOGY | Facility: HOSPITAL | Age: 80
End: 2025-01-31

## 2025-02-01 RX ORDER — SODIUM CHLORIDE, SODIUM LACTATE, POTASSIUM CHLORIDE, CALCIUM CHLORIDE 600; 310; 30; 20 MG/100ML; MG/100ML; MG/100ML; MG/100ML
125 INJECTION, SOLUTION INTRAVENOUS CONTINUOUS
Status: CANCELLED | OUTPATIENT
Start: 2025-02-01

## 2025-02-03 ENCOUNTER — HOSPITAL ENCOUNTER (OUTPATIENT)
Dept: GASTROENTEROLOGY | Facility: AMBULARY SURGERY CENTER | Age: 80
Setting detail: OUTPATIENT SURGERY
Discharge: HOME | End: 2025-02-03
Attending: INTERNAL MEDICINE
Payer: COMMERCIAL

## 2025-02-03 ENCOUNTER — ANESTHESIA (OUTPATIENT)
Dept: GASTROENTEROLOGY | Facility: AMBULARY SURGERY CENTER | Age: 80
End: 2025-02-03
Payer: COMMERCIAL

## 2025-02-03 VITALS
SYSTOLIC BLOOD PRESSURE: 134 MMHG | WEIGHT: 103 LBS | HEIGHT: 64 IN | RESPIRATION RATE: 19 BRPM | BODY MASS INDEX: 17.58 KG/M2 | HEART RATE: 66 BPM | OXYGEN SATURATION: 97 % | DIASTOLIC BLOOD PRESSURE: 60 MMHG | TEMPERATURE: 97.3 F

## 2025-02-03 DIAGNOSIS — R05.3 CHRONIC COUGH: ICD-10-CM

## 2025-02-03 DIAGNOSIS — K22.70 BARRETT'S ESOPHAGUS WITHOUT DYSPLASIA: ICD-10-CM

## 2025-02-03 DIAGNOSIS — K21.00 GASTROESOPHAGEAL REFLUX DISEASE WITH ESOPHAGITIS WITHOUT HEMORRHAGE: ICD-10-CM

## 2025-02-03 PROCEDURE — 88305 TISSUE EXAM BY PATHOLOGIST: CPT | Performed by: PATHOLOGY

## 2025-02-03 PROCEDURE — 43239 EGD BIOPSY SINGLE/MULTIPLE: CPT | Performed by: INTERNAL MEDICINE

## 2025-02-03 RX ORDER — PROPOFOL 10 MG/ML
INJECTION, EMULSION INTRAVENOUS AS NEEDED
Status: DISCONTINUED | OUTPATIENT
Start: 2025-02-03 | End: 2025-02-03

## 2025-02-03 RX ORDER — LIDOCAINE HYDROCHLORIDE 10 MG/ML
INJECTION, SOLUTION EPIDURAL; INFILTRATION; INTRACAUDAL; PERINEURAL AS NEEDED
Status: DISCONTINUED | OUTPATIENT
Start: 2025-02-03 | End: 2025-02-03

## 2025-02-03 RX ORDER — SODIUM CHLORIDE, SODIUM LACTATE, POTASSIUM CHLORIDE, CALCIUM CHLORIDE 600; 310; 30; 20 MG/100ML; MG/100ML; MG/100ML; MG/100ML
INJECTION, SOLUTION INTRAVENOUS CONTINUOUS PRN
Status: DISCONTINUED | OUTPATIENT
Start: 2025-02-03 | End: 2025-02-03

## 2025-02-03 RX ORDER — SODIUM CHLORIDE, SODIUM LACTATE, POTASSIUM CHLORIDE, CALCIUM CHLORIDE 600; 310; 30; 20 MG/100ML; MG/100ML; MG/100ML; MG/100ML
125 INJECTION, SOLUTION INTRAVENOUS CONTINUOUS
Status: DISCONTINUED | OUTPATIENT
Start: 2025-02-03 | End: 2025-02-07 | Stop reason: HOSPADM

## 2025-02-03 RX ADMIN — PROPOFOL 130 MG: 10 INJECTION, EMULSION INTRAVENOUS at 08:06

## 2025-02-03 RX ADMIN — SODIUM CHLORIDE, SODIUM LACTATE, POTASSIUM CHLORIDE, AND CALCIUM CHLORIDE 125 ML/HR: .6; .31; .03; .02 INJECTION, SOLUTION INTRAVENOUS at 07:24

## 2025-02-03 RX ADMIN — SODIUM CHLORIDE, SODIUM LACTATE, POTASSIUM CHLORIDE, AND CALCIUM CHLORIDE: .6; .31; .03; .02 INJECTION, SOLUTION INTRAVENOUS at 07:48

## 2025-02-03 RX ADMIN — LIDOCAINE HYDROCHLORIDE 50 MG: 10 INJECTION, SOLUTION EPIDURAL; INFILTRATION; INTRACAUDAL; PERINEURAL at 08:06

## 2025-02-03 NOTE — NURSING NOTE
Pt in phase 2. Report received from WIN mark and ROSE Jackson. Call bell within reach, bed in lowest position and locked, side rails up, this RN at bedside

## 2025-02-03 NOTE — INTERVAL H&P NOTE
H&P reviewed. After examining the patient I find no changes in the patients condition since the H&P had been written.    Vitals:    02/03/25 0725   BP: (!) 184/81   Pulse: 72   Resp: 18   Temp: (!) 97.3 °F (36.3 °C)   SpO2: 96%

## 2025-02-03 NOTE — ANESTHESIA PREPROCEDURE EVALUATION
Procedure:  EGD    Relevant Problems   ANESTHESIA (within normal limits)      CARDIO   (+) Mixed hyperlipidemia      ENDO   (+) Hypothyroidism      GI/HEPATIC   (+) Acid reflux      /RENAL   (+) Stage 3a chronic kidney disease (HCC)      MUSCULOSKELETAL   (+) Lumbar degenerative disc disease      PULMONARY (within normal limits)   Former smoker  Daily alcohol     Physical Exam    Airway    Mallampati score: II  TM Distance: >3 FB  Neck ROM: full     Dental   Comment: Denies loose teeth     Cardiovascular  Cardiovascular exam normal    Pulmonary  Pulmonary exam normal     Other Findings  Portions of exam deferred due to low yield and/or unknown COVID statuspost-pubertal.      Anesthesia Plan  ASA Score- 3     Anesthesia Type- IV sedation with anesthesia with ASA Monitors.         Additional Monitors:     Airway Plan:            Plan Factors-Exercise tolerance (METS): >4 METS.    Chart reviewed.   Existing labs reviewed. Patient summary reviewed.    Patient is not a current smoker.              Induction- intravenous.    Postoperative Plan-         Informed Consent- Anesthetic plan and risks discussed with patient.  I personally reviewed this patient with the CRNA. Discussed and agreed on the Anesthesia Plan with the CRNA..

## 2025-02-03 NOTE — ANESTHESIA POSTPROCEDURE EVALUATION
Post-Op Assessment Note    CV Status:  Stable  Pain Score: 0    Pain management: adequate       Mental Status:  Sleepy   Hydration Status:  Euvolemic   PONV Controlled:  Controlled   Airway Patency:  Patent     Post Op Vitals Reviewed: Yes    No anethesia notable event occurred.    Staff: Anesthesiologist, CRNA           Last Filed PACU Vitals:  Vitals Value Taken Time   Temp     Pulse 59 02/03/25 0810   /55 02/03/25 0810   Resp 12 02/03/25 0810   SpO2 100 % 02/03/25 0810

## 2025-02-03 NOTE — PERIOPERATIVE NURSING NOTE
Patient brought from procedure to phase two. Sbar given at bedside to GI rn  . Patient resting in bed, bed locked in lowest position with side rails raise, call light in reach and environment cleared. Plan of care ongoing. Patient brought from procedure to phase two. Sbar given at bedside to GI rn  . Patient resting in bed, bed locked in lowest position with side rails raise, call light in reach and environment cleared. Plan of care ongoing.

## 2025-02-03 NOTE — NURSING NOTE
Patient D/C'd to home. D/C instructions reviewed with pt and pt expressed understanding. Per pt, no questions or concerns at this time. Per pt, has all belongings. Pt was able to ambulate to ride w/o difficulty. Advised to contact Dr. Pina with any concerns once home

## 2025-02-10 PROCEDURE — 88305 TISSUE EXAM BY PATHOLOGIST: CPT | Performed by: PATHOLOGY

## 2025-02-13 ENCOUNTER — RESULTS FOLLOW-UP (OUTPATIENT)
Age: 80
End: 2025-02-13

## 2025-02-13 ENCOUNTER — TELEPHONE (OUTPATIENT)
Dept: OTOLARYNGOLOGY | Facility: CLINIC | Age: 80
End: 2025-02-13

## 2025-02-13 NOTE — RESULT ENCOUNTER NOTE
Please call the patient regarding results.  Biopsies benign.  Suggestive of acid reflux.  No evidence of Manjarrez's esophagus or precancerous changes.  No need for repeat EGD

## 2025-02-24 ENCOUNTER — OFFICE VISIT (OUTPATIENT)
Dept: AUDIOLOGY | Facility: CLINIC | Age: 80
End: 2025-02-24

## 2025-02-24 DIAGNOSIS — H90.3 SENSORINEURAL HEARING LOSS (SNHL), BILATERAL: Primary | ICD-10-CM

## 2025-02-24 NOTE — PROGRESS NOTES
Hearing Aid Visit:    Name:  Yoana Harris  :  1945  Age:  79 y.o.  MRN:  [id number]  Date of Evaluation: 25     HISTORY:    Brendan Harris was seen today (2025) for a(n) in-warranty hearing aid check of her bilateral hearing aids. Today, Brendan reports she is having difficulty with clarity. She notes things are loud but not clear    DEVICE INFORMATION:  Hearing Aid Fitting Date: 5/15/2023          Left Device Right Device   Hearing Aid Make: Phonak Phonak   Hearing Aid Model: Slim L70-R Slim L70-R   Serial Number: [id number]   Repair Warranty Expiration Date: 26   Loss/Damage Warranty Expiration Date:  26    Length: # 2  added lock  # 2  added lock     Output: M M   Wax System: centrose   Dome Size/Style: Small power Small power   Battery: Lithium-ion Rechargeable Lithium-ion Rechargeable   Earmold Serial Number: does not use [id number]   Earmold Warranty Date:  23       Serial Number: [id number]  Accessories: N/A      ACTION/ADJUSTMENTS:    Hearing aids cleaned and checked. Domes and wax filters changed and microphones brushed  Re-ran FB with a much better margin for more high frequency gain. She notes it's difficult to assess if it's improved but seems ok for now.   Re-visited silicone earmolds but insertion was difficult and she noted remembering the left giving her discomfort. Will stick with domes for now     RECOMMENDATIONS:     Return for routine hearing aid maintenance in 3 months (25) so we can change filters for her, or sooner if problems or concerns arise      Liu Ratliff., CCC-A  Clinical Audiologist  [facility] AUDIOLOGY  [address] [address]

## 2025-03-20 ENCOUNTER — TELEPHONE (OUTPATIENT)
Dept: OTOLARYNGOLOGY | Facility: CLINIC | Age: 80
End: 2025-03-20

## 2025-03-20 NOTE — TELEPHONE ENCOUNTER
LM to r/s patient's appt for 6/5/25 with Dr. Dodson.  He will not be in the office on that date.  Please r/s her on another Thursday in June when she calls back.  Thank you

## 2025-04-21 ENCOUNTER — APPOINTMENT (OUTPATIENT)
Dept: LAB | Facility: CLINIC | Age: 80
End: 2025-04-21
Attending: INTERNAL MEDICINE
Payer: COMMERCIAL

## 2025-04-21 DIAGNOSIS — R73.09 ELEVATED GLUCOSE: ICD-10-CM

## 2025-04-21 DIAGNOSIS — E87.1 HYPONATREMIA: ICD-10-CM

## 2025-04-21 DIAGNOSIS — E03.9 HYPOTHYROIDISM, UNSPECIFIED TYPE: ICD-10-CM

## 2025-04-21 DIAGNOSIS — E78.2 MIXED HYPERLIPIDEMIA: ICD-10-CM

## 2025-04-21 LAB
ALBUMIN SERPL BCG-MCNC: 4.6 G/DL (ref 3.5–5)
ALP SERPL-CCNC: 81 U/L (ref 34–104)
ALT SERPL W P-5'-P-CCNC: 15 U/L (ref 7–52)
ANION GAP SERPL CALCULATED.3IONS-SCNC: 8 MMOL/L (ref 4–13)
AST SERPL W P-5'-P-CCNC: 19 U/L (ref 13–39)
BILIRUB SERPL-MCNC: 0.69 MG/DL (ref 0.2–1)
BUN SERPL-MCNC: 17 MG/DL (ref 5–25)
CALCIUM SERPL-MCNC: 9.9 MG/DL (ref 8.4–10.2)
CHLORIDE SERPL-SCNC: 99 MMOL/L (ref 96–108)
CHOLEST SERPL-MCNC: 204 MG/DL (ref ?–200)
CO2 SERPL-SCNC: 28 MMOL/L (ref 21–32)
CREAT SERPL-MCNC: 1.21 MG/DL (ref 0.6–1.3)
ERYTHROCYTE [DISTWIDTH] IN BLOOD BY AUTOMATED COUNT: 11.9 % (ref 11.6–15.1)
EST. AVERAGE GLUCOSE BLD GHB EST-MCNC: 131 MG/DL
GFR SERPL CREATININE-BSD FRML MDRD: 42 ML/MIN/1.73SQ M
GLUCOSE P FAST SERPL-MCNC: 113 MG/DL (ref 65–99)
HBA1C MFR BLD: 6.2 %
HCT VFR BLD AUTO: 39.4 % (ref 34.8–46.1)
HDLC SERPL-MCNC: 76 MG/DL
HGB BLD-MCNC: 13.4 G/DL (ref 11.5–15.4)
LDLC SERPL CALC-MCNC: 97 MG/DL (ref 0–100)
MCH RBC QN AUTO: 31.5 PG (ref 26.8–34.3)
MCHC RBC AUTO-ENTMCNC: 34 G/DL (ref 31.4–37.4)
MCV RBC AUTO: 93 FL (ref 82–98)
NONHDLC SERPL-MCNC: 128 MG/DL
PLATELET # BLD AUTO: 292 THOUSANDS/UL (ref 149–390)
PMV BLD AUTO: 9.7 FL (ref 8.9–12.7)
POTASSIUM SERPL-SCNC: 5 MMOL/L (ref 3.5–5.3)
PROT SERPL-MCNC: 7.5 G/DL (ref 6.4–8.4)
RBC # BLD AUTO: 4.25 MILLION/UL (ref 3.81–5.12)
SODIUM SERPL-SCNC: 135 MMOL/L (ref 135–147)
TRIGL SERPL-MCNC: 156 MG/DL (ref ?–150)
TSH SERPL DL<=0.05 MIU/L-ACNC: 1.75 UIU/ML (ref 0.45–4.5)
WBC # BLD AUTO: 6.53 THOUSAND/UL (ref 4.31–10.16)

## 2025-04-21 PROCEDURE — 84443 ASSAY THYROID STIM HORMONE: CPT

## 2025-04-21 PROCEDURE — 80061 LIPID PANEL: CPT

## 2025-04-21 PROCEDURE — 36415 COLL VENOUS BLD VENIPUNCTURE: CPT

## 2025-04-21 PROCEDURE — 85027 COMPLETE CBC AUTOMATED: CPT

## 2025-04-21 PROCEDURE — 80053 COMPREHEN METABOLIC PANEL: CPT

## 2025-04-21 PROCEDURE — 83036 HEMOGLOBIN GLYCOSYLATED A1C: CPT

## 2025-04-30 ENCOUNTER — OFFICE VISIT (OUTPATIENT)
Dept: FAMILY MEDICINE CLINIC | Facility: CLINIC | Age: 80
End: 2025-04-30
Payer: COMMERCIAL

## 2025-04-30 ENCOUNTER — RESULTS FOLLOW-UP (OUTPATIENT)
Dept: FAMILY MEDICINE CLINIC | Facility: CLINIC | Age: 80
End: 2025-04-30

## 2025-04-30 VITALS
DIASTOLIC BLOOD PRESSURE: 70 MMHG | WEIGHT: 103 LBS | HEIGHT: 64 IN | BODY MASS INDEX: 17.58 KG/M2 | RESPIRATION RATE: 20 BRPM | TEMPERATURE: 97.4 F | SYSTOLIC BLOOD PRESSURE: 128 MMHG | HEART RATE: 76 BPM

## 2025-04-30 DIAGNOSIS — E78.2 MIXED HYPERLIPIDEMIA: ICD-10-CM

## 2025-04-30 DIAGNOSIS — E03.9 HYPOTHYROIDISM, UNSPECIFIED TYPE: Primary | ICD-10-CM

## 2025-04-30 DIAGNOSIS — R73.09 ELEVATED GLUCOSE: ICD-10-CM

## 2025-04-30 DIAGNOSIS — N18.31 STAGE 3A CHRONIC KIDNEY DISEASE (HCC): ICD-10-CM

## 2025-04-30 PROCEDURE — G2211 COMPLEX E/M VISIT ADD ON: HCPCS | Performed by: INTERNAL MEDICINE

## 2025-04-30 PROCEDURE — 99214 OFFICE O/P EST MOD 30 MIN: CPT | Performed by: INTERNAL MEDICINE

## 2025-04-30 NOTE — ASSESSMENT & PLAN NOTE
Stable and encouraged patient to continue exercise and follow low carb diet, and will continue to follow A1C.

## 2025-04-30 NOTE — ASSESSMENT & PLAN NOTE
Lab Results   Component Value Date    EGFR 42 04/21/2025    EGFR 48 10/23/2024    EGFR 50 04/12/2024    CREATININE 1.21 04/21/2025    CREATININE 1.08 10/23/2024    CREATININE 1.04 04/12/2024     This continues to slowly worsen. She will see nephrology for an opinion and ordered an US to be done prior to that evaluation.   Orders:  •  Ambulatory Referral to Nephrology; Future  •  US kidney and bladder; Future

## 2025-04-30 NOTE — PROGRESS NOTES
"Name: Yoana Harris      : 1945      MRN: [id number]  Encounter Provider: Morenita Todd MD  Encounter Date: 2025   Encounter department: [facility]  :  Assessment & Plan  Hypothyroidism, unspecified type  TSH is within goal and she will continue levothyroxine 50 mcg daily.       Elevated glucose  Stable and encouraged patient to continue exercise and follow low carb diet, and will continue to follow A1C.       Mixed hyperlipidemia  Reviewed labs with patient.  TG was elevated but her diet has changed and she will modify this.  LDL still within desired goal. Will continue atorvastatin 10 mg for now.        Stage 3a chronic kidney disease (HCC)  Lab Results   Component Value Date    EGFR 42 2025    EGFR 48 10/23/2024    EGFR 50 2024    CREATININE 1.21 2025    CREATININE 1.08 10/23/2024    CREATININE 1.04 2024     This continues to slowly worsen. She will see nephrology for an opinion and ordered an US to be done prior to that evaluation.   Orders:  •  Ambulatory Referral to Nephrology; Future  •  US kidney and bladder; Future           History of Present Illness   Here for follow up visit.  She is concerned that her kidney function continues to slowly decline. She does not use any NSAIDS.  Otherwise is feeling well, we reviewed her labs.  She has been eating cream cheese every day and feels that is why her lipids are up.  Continues to be active with walking. There is no cold or heat intolerance, diarrhea or constipation, hair or skin changes, unanticipated weight gain or loss.        Review of Systems   Constitutional: Negative.    Respiratory: Negative.     Cardiovascular: Negative.    Endocrine: Negative for cold intolerance and heat intolerance.       Objective   /70   Pulse 76   Temp (!) 97.4 °F (36.3 °C)   Resp 20   Ht 5' 4\" (1.626 m)   Wt 46.7 kg (103 lb)   BMI 17.68 kg/m²      Physical Exam  Constitutional:       Appearance: Normal appearance. "   HENT:      Head: Normocephalic and atraumatic.   Eyes:      Pupils: Pupils are equal, round, and reactive to light.   Cardiovascular:      Rate and Rhythm: Normal rate and regular rhythm.      Heart sounds: No murmur heard.     No friction rub. No gallop.   Pulmonary:      Effort: Pulmonary effort is normal.      Breath sounds: Normal breath sounds. No wheezing or rales.   Abdominal:      General: Abdomen is flat. Bowel sounds are normal.      Palpations: Abdomen is soft.      Tenderness: There is no abdominal tenderness.   Musculoskeletal:         General: No swelling.   Neurological:      Mental Status: She is alert.

## 2025-04-30 NOTE — ASSESSMENT & PLAN NOTE
Reviewed labs with patient.  TG was elevated but her diet has changed and she will modify this.  LDL still within desired goal. Will continue atorvastatin 10 mg for now.

## 2025-05-05 ENCOUNTER — HOSPITAL ENCOUNTER (OUTPATIENT)
Dept: RADIOLOGY | Facility: HOSPITAL | Age: 80
Discharge: HOME | End: 2025-05-05
Attending: INTERNAL MEDICINE
Payer: COMMERCIAL

## 2025-05-05 DIAGNOSIS — N18.31 STAGE 3A CHRONIC KIDNEY DISEASE (HCC): ICD-10-CM

## 2025-05-05 PROCEDURE — 76775 US EXAM ABDO BACK WALL LIM: CPT

## 2025-05-12 ENCOUNTER — RESULTS FOLLOW-UP (OUTPATIENT)
Dept: FAMILY MEDICINE CLINIC | Facility: CLINIC | Age: 80
End: 2025-05-12

## 2025-05-20 ENCOUNTER — OFFICE VISIT (OUTPATIENT)
Dept: AUDIOLOGY | Facility: CLINIC | Age: 80
End: 2025-05-20

## 2025-05-20 DIAGNOSIS — H90.3 SENSORINEURAL HEARING LOSS (SNHL), BILATERAL: Primary | ICD-10-CM

## 2025-05-20 NOTE — PROGRESS NOTES
Hearing Aid Visit:    Name:  Yoana Harris  :  1945  Age:  80 y.o.  MRN:  [id number]  Date of Evaluation: 25     HISTORY:    Yoana Harris was seen today (2025) for a(n) in-warranty hearing aid check of her bilateral hearing aids. Today, Yoana notes that she is doing well with the devices but sometimes feels she needs the TV volume up a bit.    DEVICE INFORMATION:      Left Device Right Device   Hearing Aid Make: Phonak Phonak   Hearing Aid Model: Slim L70-R Slim L70-R   Serial Number: [id number]   Repair Warranty Expiration Date: 26   Loss/Damage Warranty Expiration Date:  26    Length: # 2  added lock  # 2  added lock     Output: M M   Wax System: [employer]ef[employer]   Dome Size/Style: Small power Small power   Battery: Lithium-ion Rechargeable Lithium-ion Rechargeable   Earmold Serial Number: does not use [id number]   Earmold Warranty Date:  23       Serial Number: [id number]  Accessories: N/A      ACTION/ADJUSTMENTS:    Hearing aids cleaned and checked. Domes and wax filters changed and microphones vacuumed  Increased mid frequency gain for better sound quality and improved speech understanding.     RECOMMENDATIONS:     Return for routine hearing aid maintenance in about 3 month(s) (25) or sooner if problems or concerns arise.      Liu Ratliff., CCC-A  Clinical Audiologist [id number]  [facility] PROFESSIONAL [facility] AUDIOLOGY  [address] [address]

## 2025-06-09 DIAGNOSIS — E03.9 HYPOTHYROIDISM, UNSPECIFIED TYPE: ICD-10-CM

## 2025-06-09 RX ORDER — LEVOTHYROXINE SODIUM 50 UG/1
50 TABLET ORAL EVERY MORNING
Qty: 90 TABLET | Refills: 1 | Status: SHIPPED | OUTPATIENT
Start: 2025-06-09

## 2025-06-12 ENCOUNTER — OFFICE VISIT (OUTPATIENT)
Dept: FAMILY MEDICINE CLINIC | Facility: CLINIC | Age: 80
End: 2025-06-12
Payer: COMMERCIAL

## 2025-06-12 VITALS
TEMPERATURE: 97.8 F | DIASTOLIC BLOOD PRESSURE: 68 MMHG | BODY MASS INDEX: 17.72 KG/M2 | WEIGHT: 103.8 LBS | SYSTOLIC BLOOD PRESSURE: 110 MMHG | HEIGHT: 64 IN

## 2025-06-12 DIAGNOSIS — H61.23 BILATERAL IMPACTED CERUMEN: Primary | ICD-10-CM

## 2025-06-12 PROCEDURE — 69210 REMOVE IMPACTED EAR WAX UNI: CPT | Performed by: NURSE PRACTITIONER

## 2025-06-12 PROCEDURE — 99213 OFFICE O/P EST LOW 20 MIN: CPT | Performed by: NURSE PRACTITIONER

## 2025-06-12 NOTE — PROGRESS NOTES
"Name: Yoana Harris      : 1945      MRN: [id number]  Encounter Provider: KINDRA Taylor  Encounter Date: 2025   Encounter department: [facility]  :  Assessment & Plan  Bilateral impacted cerumen         Ear cerumen removal    Date/Time: 2025 12:45 PM    Performed by: KINDRA Taylor  Authorized by: KINDRA Taylor    Universal Protocol:  Consent: Verbal consent obtained  Consent given by: patient    Patient location:  Clinic  Procedure details:     Local anesthetic:  None    Location:  L ear and R ear    Procedure type: irrigation with instrumentation      Instrumentation: curette      Approach:  External  Post-procedure details:     Complication:  None    Hearing quality:  Normal    Patient tolerance of procedure:  Tolerated well, no immediate complications           History of Present Illness   Here today for ear lavage.  She wears hearing aids and likes to have this done every 6 months.      Review of Systems   HENT:          See HPI   All other systems reviewed and are negative.      Objective   /68 (BP Location: Left arm, Patient Position: Sitting, Cuff Size: Adult)   Temp 97.8 °F (36.6 °C) (Temporal)   Ht 5' 4\" (1.626 m)   Wt 47.1 kg (103 lb 12.8 oz)   BMI 17.82 kg/m²      Physical Exam  Vitals and nursing note reviewed.   Constitutional:       General: She is not in acute distress.     Appearance: Normal appearance. She is well-developed. She is not diaphoretic.   HENT:      Right Ear: There is impacted cerumen.      Left Ear: There is impacted cerumen.     Eyes:      Conjunctiva/sclera: Conjunctivae normal.     Pulmonary:      Effort: Pulmonary effort is normal. No respiratory distress.     Neurological:      Mental Status: She is alert.     Psychiatric:         Mood and Affect: Mood normal.         Behavior: Behavior normal.         "

## 2025-06-26 ENCOUNTER — CONSULT (OUTPATIENT)
Dept: NEPHROLOGY | Facility: CLINIC | Age: 80
End: 2025-06-26
Attending: INTERNAL MEDICINE
Payer: COMMERCIAL

## 2025-06-26 VITALS
DIASTOLIC BLOOD PRESSURE: 82 MMHG | HEIGHT: 64 IN | WEIGHT: 103 LBS | SYSTOLIC BLOOD PRESSURE: 144 MMHG | OXYGEN SATURATION: 96 % | HEART RATE: 87 BPM | BODY MASS INDEX: 17.58 KG/M2

## 2025-06-26 DIAGNOSIS — N26.1 BILATERAL RENAL ATROPHY: Primary | ICD-10-CM

## 2025-06-26 DIAGNOSIS — N18.31 STAGE 3A CHRONIC KIDNEY DISEASE (HCC): ICD-10-CM

## 2025-06-26 DIAGNOSIS — Z87.891 FORMER SMOKER, STOPPED SMOKING MANY YEARS AGO: ICD-10-CM

## 2025-06-26 DIAGNOSIS — R03.0 ELEVATED BLOOD PRESSURE READING WITHOUT DIAGNOSIS OF HYPERTENSION: ICD-10-CM

## 2025-06-26 PROCEDURE — 99204 OFFICE O/P NEW MOD 45 MIN: CPT | Performed by: INTERNAL MEDICINE

## 2025-06-26 NOTE — PROGRESS NOTES
Name: Yoana Harris      : 1945      MRN: [id number]  Encounter Provider: Velia Fisher MD  Encounter Date: 2025   Encounter department: [facility]  :  Assessment & Plan  Stage 3a chronic kidney disease (HCC)  Lab Results   Component Value Date    EGFR 42 2025    EGFR 48 10/23/2024    EGFR 50 2024    CREATININE 1.21 2025    CREATININE 1.08 10/23/2024    CREATININE 1.04 2024     Chronic kidney disease stage III  -- Baseline creatinine appears to be low 1's  -- No concurrent comorbidities such as hypertension or diabetes  -- Patient was a former smoker about 1 pack/day for about 30 years quit in   -- Renal ultrasound reviewed from last month personally.  Has evidence of bilateral renal atrophy with both kidneys measuring less than 9 cm.  Simple cyst noted in the right kidney no further workup required for the cyst.  -- Given the bilateral renal atrophy and smaller size of kidneys with history of smoking possible underlying renovascular disease  -- Check renal artery Doppler  -- Avoid NSAIDs  -- Blood pressure usually well-controlled she does have tremors at times which may at times throughout the blood pressure  -- Check urine analysis and screen for albuminuria  -- Check Cystatin C, as her BMI is 17, poor muscle mass    Orders:    Ambulatory Referral to Nephrology    VAS renal artery complete; Future    Cystatin C With eGFR; Future    Albumin / creatinine urine ratio; Future    Comprehensive metabolic panel; Future    Urinalysis with microscopic; Future    Bilateral renal atrophy  -- Former smoker both kidneys measure less than 9 cm each.  --Check renal artery Doppler    Orders:    VAS renal artery complete; Future    Cystatin C With eGFR; Future    Albumin / creatinine urine ratio; Future    Comprehensive metabolic panel; Future    Urinalysis with microscopic; Future    Elevated blood pressure reading without diagnosis of hypertension  -- Patient  does have essential tremors can cause shaking of the arms when checking blood pressure which may throw off and cause an elevated blood pressure and may be more pseudohypertension  --Blood pressure can range anywhere from 100-140 systolic.  --Continue home blood pressure monitoring  --Former smoker  --Low 2 g sodium diet  --BMI 17.6    Orders:    VAS renal artery complete; Future    Cystatin C With eGFR; Future    Albumin / creatinine urine ratio; Future    Comprehensive metabolic panel; Future    Urinalysis with microscopic; Future    Former smoker, stopped smoking many years ago  -- Quit in 1991 was a 1 pack/day smoker for 30 years    Orders:    VAS renal artery complete; Future    Cystatin C With eGFR; Future    Albumin / creatinine urine ratio; Future    Comprehensive metabolic panel; Future    Urinalysis with microscopic; Future        Patient Instructions   1.)  Low 2 g sodium diet    2.)  Monitor weights at home    3.)  Avoid NSAIDs (ibuprofen, Motrin, Advil, Aleve, naproxen)    4.)  Monitor blood pressure at home, call if blood pressure greater than 150/90 persistently    5.) I will plan to discuss all results including blood work, and/or imaging at our next visit, unless there is an urgent indication, in which case I will call you earlier. If you have any questions or concerns about your results, please feel free to call our office.    6.) Renal artery US          It was a pleasure evaluating your patient in the office today. Thank you for allowing our team to participate in the care of  Yoana Harris. Please do not hesitate to contact our team if further issues/questions shall arise in the interim.     History of Present Illness   HPI  Yoana Harris is a 80 y.o. female who presents evaluation of low GFR and abnormal kidney function test.    On review of her blood work over the last 7 to 8 years.  Her creatinine has fluctuated anywhere from 0.9 to 1.2 mg/dL with a GFR this been slowly trending down  currently at 42 mL/min.  She was fasting for that last blood work.    She has poor muscle mass.  BMI 17.6.  Will check a Cystatin C.    She has no history of hypertension or diabetes.  Her blood pressure can fluctuate a lot.  She does have a history of essential tremors that did not resolve with conservative management currently takes Botox every 3 months for her neck primarily.    Reports no excessive NSAID use.    Levothyroxine for her thyroid.    No urinary symptoms.    Renal ultrasound from May personally reviewed by myself.  Bilateral renal atrophy kidneys both on the small side with a simple cyst on one kidney.    History obtained from: patient  Pertinent Medical History          Review of Systems   Constitutional:  Negative for activity change and fever.   Respiratory:  Negative for cough, chest tightness, shortness of breath and wheezing.    Cardiovascular:  Negative for chest pain and leg swelling.   Gastrointestinal:  Negative for abdominal pain, diarrhea, nausea and vomiting.   Endocrine: Negative for polyuria.   Genitourinary:  Negative for difficulty urinating, dysuria, flank pain, frequency and urgency.   Skin:  Negative for rash.   Neurological:  Negative for dizziness, syncope, light-headedness and headaches.     Medical History Reviewed by provider this encounter:  Meds     .  Pertinent Medical History            Medical History Reviewed by provider this encounter:  Meds     .  Past Medical History   Past Medical History[1]  Past Surgical History[2]  Family History[3]   reports that she quit smoking about 33 years ago. Her smoking use included cigarettes. She started smoking about 64 years ago. She has a 30.2 pack-year smoking history. She has been exposed to tobacco smoke. She has never used smokeless tobacco. She reports current alcohol use of about 10.0 standard drinks of alcohol per week. She reports that she does not use drugs.  Current Outpatient Medications   Medication Instructions     "atorvastatin (LIPITOR) 10 mg, Oral, Every morning    brinzolamide (AZOPT) 1 % ophthalmic suspension 2 times daily    famotidine (PEPCID) 40 MG tablet take 1 tablet by mouth once daily    fluocinolone acetonide (DERMOTIC) 0.01 % otic oil 3 drops, Both Ears, 2 times daily PRN    levothyroxine 50 mcg, Oral, Every morning    travoprost (TRAVATAN-Z) 0.004 % ophthalmic solution 1 drop, Daily at bedtime   Allergies[4]   Medications Ordered Prior to Encounter[5]   Social History[6]     Medications Ordered Prior to Encounter[7]  Objective   /82 (BP Location: Right arm, Patient Position: Sitting, Cuff Size: Standard)   Pulse 87   Ht 5' 4\" (1.626 m)   Wt 46.7 kg (103 lb)   SpO2 96%   BMI 17.68 kg/m²      Physical Exam  Constitutional:       General: She is not in acute distress.     Appearance: She is well-developed. She is not diaphoretic.   HENT:      Head: Normocephalic and atraumatic.     Eyes:      General: No scleral icterus.     Pupils: Pupils are equal, round, and reactive to light.       Cardiovascular:      Rate and Rhythm: Normal rate and regular rhythm.      Heart sounds: Normal heart sounds. No murmur heard.     No friction rub. No gallop.   Pulmonary:      Effort: Pulmonary effort is normal. No respiratory distress.      Breath sounds: Normal breath sounds. No wheezing or rales.   Chest:      Chest wall: No tenderness.   Abdominal:      General: Bowel sounds are normal. There is no distension.      Palpations: Abdomen is soft.      Tenderness: There is no abdominal tenderness. There is no rebound.     Musculoskeletal:         General: Normal range of motion.      Cervical back: Normal range of motion and neck supple.     Skin:     Findings: No rash.     Neurological:      Mental Status: She is alert and oriented to person, place, and time.           Laboratory Results:        Invalid input(s): \"ALBUMIN\"    Results for orders placed or performed in visit on 04/21/25   CBC   Result Value Ref Range    WBC " 6.53 4.31 - 10.16 Thousand/uL    RBC 4.25 3.81 - 5.12 Million/uL    Hemoglobin 13.4 11.5 - 15.4 g/dL    Hematocrit 39.4 34.8 - 46.1 %    MCV 93 82 - 98 fL    MCH 31.5 26.8 - 34.3 pg    MCHC 34.0 31.4 - 37.4 g/dL    RDW 11.9 11.6 - 15.1 %    Platelets 292 149 - 390 Thousands/uL    MPV 9.7 8.9 - 12.7 fL   Comprehensive metabolic panel   Result Value Ref Range    Sodium 135 135 - 147 mmol/L    Potassium 5.0 3.5 - 5.3 mmol/L    Chloride 99 96 - 108 mmol/L    CO2 28 21 - 32 mmol/L    ANION GAP 8 4 - 13 mmol/L    BUN 17 5 - 25 mg/dL    Creatinine 1.21 0.60 - 1.30 mg/dL    Glucose, Fasting 113 (H) 65 - 99 mg/dL    Calcium 9.9 8.4 - 10.2 mg/dL    AST 19 13 - 39 U/L    ALT 15 7 - 52 U/L    Alkaline Phosphatase 81 34 - 104 U/L    Total Protein 7.5 6.4 - 8.4 g/dL    Albumin 4.6 3.5 - 5.0 g/dL    Total Bilirubin 0.69 0.20 - 1.00 mg/dL    eGFR 42 ml/min/1.73sq m   Lipid panel   Result Value Ref Range    Cholesterol 204 (H) See Comment mg/dL    Triglycerides 156 (H) See Comment mg/dL    HDL, Direct 76 >=50 mg/dL    LDL Calculated 97 0 - 100 mg/dL    Non-HDL-Chol (CHOL-HDL) 128 mg/dl   Hemoglobin A1C   Result Value Ref Range    Hemoglobin A1C 6.2 (H) Normal 4.0-5.6%; PreDiabetic 5.7-6.4%; Diabetic >=6.5%; Glycemic control for adults with diabetes <7.0% %     mg/dl   TSH, 3rd generation with Free T4 reflex   Result Value Ref Range    TSH 3RD GENERATION 1.754 0.450 - 4.500 uIU/mL     *Note: Due to a large number of results and/or encounters for the requested time period, some results have not been displayed. A complete set of results can be found in Results Review.       Administrative Statements   I have spent a total time of 50 minutes in caring for this patient on the day of the visit/encounter including Diagnostic results, Prognosis, Risks and benefits of tx options, Instructions for management, Patient and family education, Importance of tx compliance, Risk factor reductions, Impressions, Counseling / Coordination of  care, Documenting in the medical record, Reviewing/placing orders in the medical record (including tests, medications, and/or procedures), and Obtaining or reviewing history  .       [1]   Past Medical History:  Diagnosis Date    Manjarrez esophagus     Chronic kidney disease     Colon polyp     COVID-19 02/2021    cough, lost sense of taste for a few days    COVID-19 vaccine series completed     Diabetes mellitus (HCC)     Disease of thyroid gland     hypo    Ear problems approx. 2019    impacted wax    Essential tremor     hands and head    GERD (gastroesophageal reflux disease)     Glaucoma     both eyes    HL (hearing loss) around 2012    Hyperlipidemia     Osteoporosis     Persistent dry cough     d/t acid reflux    Pre-diabetes     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., 2021    Wears glasses     Wears hearing aid     darien. ears   [2]   Past Surgical History:  Procedure Laterality Date    CATARACT EXTRACTION Bilateral     COLONOSCOPY      EGD      EPIDURAL BLOCK INJECTION Right 6/2/2023    Procedure: L4 L5  TRANSFORAMINAL epidural steroid injection ([id number]);  Surgeon: Mony Silva MD;  Location: [facility] MAIN OR;  Service: Pain Management     UPPER GASTROINTESTINAL ENDOSCOPY     [3]   Family History  Problem Relation Name Age of Onset    Kidney disease Mother      Throat cancer Father      Diabetes Sister Marilee     Rheum arthritis Sister Marilee     Diabetes Brother Jefe         pre-diabetes    Rheum arthritis Brother Jefe     Diabetes Brother Jarred         DM    COPD Brother Jarred     Arthritis Brother Keith     Diabetes Brother Keith     Cancer Brother Jorge     Cancer Maternal Grandmother Marilee Leung         breast    Diabetes Paternal Grandmother Carol     Dystonia Neg Hx      Tremor Neg Hx     [4] No Known Allergies  [5]   Current Outpatient Medications on File Prior to Visit   Medication Sig Dispense Refill    atorvastatin (LIPITOR) 10 mg tablet take 1 tablet by mouth every morning 90 tablet 1    brinzolamide  (AZOPT) 1 % ophthalmic suspension Administer to the right eye in the morning and before bedtime.      famotidine (PEPCID) 40 MG tablet take 1 tablet by mouth once daily 90 tablet 1    levothyroxine 50 mcg tablet Take 1 tablet (50 mcg total) by mouth every morning 90 tablet 1    travoprost (TRAVATAN-Z) 0.004 % ophthalmic solution Apply 1 drop to eye daily at bedtime Both eyes      fluocinolone acetonide (DERMOTIC) 0.01 % otic oil Administer 3 drops into both ears 2 (two) times a day as needed (for itchy ears) 20 mL 11     Current Facility-Administered Medications on File Prior to Visit   Medication Dose Route Frequency Provider Last Rate Last Admin    Botulinum Toxin Type A SOLR 200 Units  200 Units Injection Q3 Months Manuela Rivero MD       [6]   Social History  Tobacco Use    Smoking status: Former     Current packs/day: 0.00     Average packs/day: 1 pack/day for 30.2 years (30.2 ttl pk-yrs)     Types: Cigarettes     Start date: 1961     Quit date: 1991     Years since quittin.8     Passive exposure: Past    Smokeless tobacco: Never   Vaping Use    Vaping status: Never Used   Substance and Sexual Activity    Alcohol use: Yes     Alcohol/week: 10.0 standard drinks of alcohol     Types: 10 Cans of beer per week     Comment: a can of two of beer per week    Drug use: Never    Sexual activity: Not Currently   [7]   Current Outpatient Medications on File Prior to Visit   Medication Sig Dispense Refill    atorvastatin (LIPITOR) 10 mg tablet take 1 tablet by mouth every morning 90 tablet 1    brinzolamide (AZOPT) 1 % ophthalmic suspension Administer to the right eye in the morning and before bedtime.      famotidine (PEPCID) 40 MG tablet take 1 tablet by mouth once daily 90 tablet 1    levothyroxine 50 mcg tablet Take 1 tablet (50 mcg total) by mouth every morning 90 tablet 1    travoprost (TRAVATAN-Z) 0.004 % ophthalmic solution Apply 1 drop to eye daily at bedtime Both eyes      fluocinolone  acetonide (DERMOTIC) 0.01 % otic oil Administer 3 drops into both ears 2 (two) times a day as needed (for itchy ears) 20 mL 11     Current Facility-Administered Medications on File Prior to Visit   Medication Dose Route Frequency Provider Last Rate Last Admin    Botulinum Toxin Type A SOLR 200 Units  200 Units Injection Q3 Months Manuela Rivero MD

## 2025-06-26 NOTE — ASSESSMENT & PLAN NOTE
-- Quit in 1991 was a 1 pack/day smoker for 30 years    Orders:    VAS renal artery complete; Future    Cystatin C With eGFR; Future    Albumin / creatinine urine ratio; Future    Comprehensive metabolic panel; Future    Urinalysis with microscopic; Future

## 2025-06-26 NOTE — ASSESSMENT & PLAN NOTE
Lab Results   Component Value Date    EGFR 42 04/21/2025    EGFR 48 10/23/2024    EGFR 50 04/12/2024    CREATININE 1.21 04/21/2025    CREATININE 1.08 10/23/2024    CREATININE 1.04 04/12/2024     Chronic kidney disease stage III  -- Baseline creatinine appears to be low 1's  -- No concurrent comorbidities such as hypertension or diabetes  -- Patient was a former smoker about 1 pack/day for about 30 years quit in 1991  -- Renal ultrasound reviewed from last month personally.  Has evidence of bilateral renal atrophy with both kidneys measuring less than 9 cm.  Simple cyst noted in the right kidney no further workup required for the cyst.  -- Given the bilateral renal atrophy and smaller size of kidneys with history of smoking possible underlying renovascular disease  -- Check renal artery Doppler  -- Avoid NSAIDs  -- Blood pressure usually well-controlled she does have tremors at times which may at times throughout the blood pressure  -- Check urine analysis and screen for albuminuria  -- Check Cystatin C, as her BMI is 17, poor muscle mass    Orders:    Ambulatory Referral to Nephrology    VAS renal artery complete; Future    Cystatin C With eGFR; Future    Albumin / creatinine urine ratio; Future    Comprehensive metabolic panel; Future    Urinalysis with microscopic; Future

## 2025-06-26 NOTE — PATIENT INSTRUCTIONS
1.)  Low 2 g sodium diet    2.)  Monitor weights at home    3.)  Avoid NSAIDs (ibuprofen, Motrin, Advil, Aleve, naproxen)    4.)  Monitor blood pressure at home, call if blood pressure greater than 150/90 persistently    5.) I will plan to discuss all results including blood work, and/or imaging at our next visit, unless there is an urgent indication, in which case I will call you earlier. If you have any questions or concerns about your results, please feel free to call our office.    6.) Renal artery US

## 2025-06-26 NOTE — ASSESSMENT & PLAN NOTE
-- Patient does have essential tremors can cause shaking of the arms when checking blood pressure which may throw off and cause an elevated blood pressure and may be more pseudohypertension  --Blood pressure can range anywhere from 100-140 systolic.  --Continue home blood pressure monitoring  --Former smoker  --Low 2 g sodium diet  --BMI 17.6    Orders:    VAS renal artery complete; Future    Cystatin C With eGFR; Future    Albumin / creatinine urine ratio; Future    Comprehensive metabolic panel; Future    Urinalysis with microscopic; Future

## 2025-06-26 NOTE — ASSESSMENT & PLAN NOTE
-- Former smoker both kidneys measure less than 9 cm each.  --Check renal artery Doppler    Orders:    VAS renal artery complete; Future    Cystatin C With eGFR; Future    Albumin / creatinine urine ratio; Future    Comprehensive metabolic panel; Future    Urinalysis with microscopic; Future

## 2025-06-27 ENCOUNTER — APPOINTMENT (OUTPATIENT)
Dept: LAB | Facility: CLINIC | Age: 80
End: 2025-06-27
Payer: COMMERCIAL

## 2025-06-27 DIAGNOSIS — R03.0 ELEVATED BLOOD PRESSURE READING WITHOUT DIAGNOSIS OF HYPERTENSION: ICD-10-CM

## 2025-06-27 DIAGNOSIS — N18.31 STAGE 3A CHRONIC KIDNEY DISEASE (HCC): ICD-10-CM

## 2025-06-27 DIAGNOSIS — N26.1 BILATERAL RENAL ATROPHY: ICD-10-CM

## 2025-06-27 DIAGNOSIS — Z87.891 FORMER SMOKER, STOPPED SMOKING MANY YEARS AGO: ICD-10-CM

## 2025-06-27 LAB
ALBUMIN SERPL BCG-MCNC: 4.8 G/DL (ref 3.5–5)
ALP SERPL-CCNC: 82 U/L (ref 34–104)
ALT SERPL W P-5'-P-CCNC: 16 U/L (ref 7–52)
ANION GAP SERPL CALCULATED.3IONS-SCNC: 9 MMOL/L (ref 4–13)
AST SERPL W P-5'-P-CCNC: 21 U/L (ref 13–39)
BACTERIA UR QL AUTO: ABNORMAL /HPF
BILIRUB SERPL-MCNC: 0.8 MG/DL (ref 0.2–1)
BILIRUB UR QL STRIP: NEGATIVE
BUN SERPL-MCNC: 14 MG/DL (ref 5–25)
CALCIUM SERPL-MCNC: 9.8 MG/DL (ref 8.4–10.2)
CHLORIDE SERPL-SCNC: 98 MMOL/L (ref 96–108)
CLARITY UR: CLEAR
CO2 SERPL-SCNC: 27 MMOL/L (ref 21–32)
COLOR UR: ABNORMAL
CREAT SERPL-MCNC: 1.1 MG/DL (ref 0.6–1.3)
CREAT UR-MCNC: 102.1 MG/DL
GFR SERPL CREATININE-BSD FRML MDRD: 46 ML/MIN/1.73SQ M
GLUCOSE P FAST SERPL-MCNC: 117 MG/DL (ref 65–99)
GLUCOSE UR STRIP-MCNC: NEGATIVE MG/DL
HGB UR QL STRIP.AUTO: NEGATIVE
KETONES UR STRIP-MCNC: NEGATIVE MG/DL
LEUKOCYTE ESTERASE UR QL STRIP: ABNORMAL
MICROALBUMIN UR-MCNC: 39.1 MG/L
MICROALBUMIN/CREAT 24H UR: 38 MG/G CREATININE (ref 0–30)
NITRITE UR QL STRIP: NEGATIVE
NON-SQ EPI CELLS URNS QL MICRO: ABNORMAL /HPF
PH UR STRIP.AUTO: 6 [PH]
POTASSIUM SERPL-SCNC: 4.5 MMOL/L (ref 3.5–5.3)
PROT SERPL-MCNC: 7.5 G/DL (ref 6.4–8.4)
PROT UR STRIP-MCNC: NEGATIVE MG/DL
RBC #/AREA URNS AUTO: ABNORMAL /HPF
SODIUM SERPL-SCNC: 134 MMOL/L (ref 135–147)
SP GR UR STRIP.AUTO: 1.01 (ref 1–1.03)
UROBILINOGEN UR STRIP-ACNC: <2 MG/DL
WBC #/AREA URNS AUTO: ABNORMAL /HPF

## 2025-06-27 PROCEDURE — 81001 URINALYSIS AUTO W/SCOPE: CPT

## 2025-06-27 PROCEDURE — 82043 UR ALBUMIN QUANTITATIVE: CPT

## 2025-06-27 PROCEDURE — 82570 ASSAY OF URINE CREATININE: CPT

## 2025-06-27 PROCEDURE — 36415 COLL VENOUS BLD VENIPUNCTURE: CPT

## 2025-06-27 PROCEDURE — 80053 COMPREHEN METABOLIC PANEL: CPT

## 2025-06-27 PROCEDURE — 82610 CYSTATIN C: CPT

## 2025-06-28 LAB
CYSTATIN C SERPL-MCNC: 1.08 MG/L (ref 0.87–1.12)
GFR/BSA.PRED SERPLBLD CYS-BASED-ARV: 60 ML/MIN/1.73

## 2025-07-16 DIAGNOSIS — R05.3 CHRONIC COUGH: ICD-10-CM

## 2025-07-16 DIAGNOSIS — K22.70 BARRETT'S ESOPHAGUS WITHOUT DYSPLASIA: ICD-10-CM

## 2025-07-16 DIAGNOSIS — K21.00 GASTROESOPHAGEAL REFLUX DISEASE WITH ESOPHAGITIS WITHOUT HEMORRHAGE: ICD-10-CM

## 2025-07-16 RX ORDER — FAMOTIDINE 40 MG/1
40 TABLET, FILM COATED ORAL DAILY
Qty: 90 TABLET | Refills: 1 | Status: SHIPPED | OUTPATIENT
Start: 2025-07-16

## 2025-07-17 ENCOUNTER — HOSPITAL ENCOUNTER (OUTPATIENT)
Dept: RADIOLOGY | Facility: HOSPITAL | Age: 80
Discharge: HOME | End: 2025-07-17
Attending: INTERNAL MEDICINE
Payer: COMMERCIAL

## 2025-07-17 DIAGNOSIS — Z87.891 FORMER SMOKER, STOPPED SMOKING MANY YEARS AGO: ICD-10-CM

## 2025-07-17 DIAGNOSIS — N18.31 STAGE 3A CHRONIC KIDNEY DISEASE (HCC): ICD-10-CM

## 2025-07-17 DIAGNOSIS — N26.1 BILATERAL RENAL ATROPHY: ICD-10-CM

## 2025-07-17 DIAGNOSIS — R03.0 ELEVATED BLOOD PRESSURE READING WITHOUT DIAGNOSIS OF HYPERTENSION: ICD-10-CM

## 2025-07-17 PROCEDURE — 93975 VASCULAR STUDY: CPT | Performed by: SURGERY

## 2025-07-17 PROCEDURE — 93975 VASCULAR STUDY: CPT

## 2025-08-05 DIAGNOSIS — E78.2 MIXED HYPERLIPIDEMIA: ICD-10-CM

## 2025-08-06 RX ORDER — ATORVASTATIN CALCIUM 10 MG/1
10 TABLET, FILM COATED ORAL EVERY MORNING
Qty: 90 TABLET | Refills: 1 | Status: SHIPPED | OUTPATIENT
Start: 2025-08-06

## 2025-08-11 ENCOUNTER — OFFICE VISIT (OUTPATIENT)
Dept: AUDIOLOGY | Facility: CLINIC | Age: 80
End: 2025-08-11

## (undated) DEVICE — TOWEL SET X-RAY

## (undated) DEVICE — TRAY EPID CONT PERIFIX 18G X 3.5IN 5ML CLSD TIP DRAPE

## (undated) DEVICE — GLOVE UNDER SRG SKINSENSE 7.5

## (undated) DEVICE — NEEDLE SPINAL 22G X 5IN QUINCKE

## (undated) DEVICE — SKIN MARKER DUAL TIP WITH RULER CAP, FLEXIBLE RULER AND LABELS: Brand: DEVON

## (undated) DEVICE — RADIOLOGY STERILE LABELS: Brand: CENTURION

## (undated) DEVICE — SMALL NEEDLE COUNTER NEST

## (undated) DEVICE — NEEDLE BLUNT 18 G X 1 1/2 W FILTER

## (undated) DEVICE — SYRINGE 5ML LL

## (undated) DEVICE — PLASTIC ADHESIVE BANDAGE: Brand: CURITY

## (undated) DEVICE — WIPES BABY PAMPERS SENSITIVE 36/PK

## (undated) DEVICE — CHLORAPREP APPLICATOR TINTED 10.5ML ONE-STEP

## (undated) DEVICE — FLEXIBLE ADHESIVE BANDAGE,X-LARGE: Brand: CURITY